# Patient Record
Sex: FEMALE | Race: WHITE | NOT HISPANIC OR LATINO
[De-identification: names, ages, dates, MRNs, and addresses within clinical notes are randomized per-mention and may not be internally consistent; named-entity substitution may affect disease eponyms.]

---

## 2019-05-02 ENCOUNTER — TRANSCRIPTION ENCOUNTER (OUTPATIENT)
Age: 79
End: 2019-05-02

## 2019-05-03 ENCOUNTER — INPATIENT (INPATIENT)
Facility: HOSPITAL | Age: 79
LOS: 4 days | Discharge: NURSING, REHAB OR LONG-TERM CARE | End: 2019-05-08
Attending: INTERNAL MEDICINE | Admitting: INTERNAL MEDICINE
Payer: MEDICARE

## 2019-05-03 VITALS
OXYGEN SATURATION: 92 % | WEIGHT: 145.06 LBS | HEIGHT: 63 IN | RESPIRATION RATE: 20 BRPM | TEMPERATURE: 98 F | HEART RATE: 61 BPM | DIASTOLIC BLOOD PRESSURE: 97 MMHG | SYSTOLIC BLOOD PRESSURE: 180 MMHG

## 2019-05-03 DIAGNOSIS — I10 ESSENTIAL (PRIMARY) HYPERTENSION: ICD-10-CM

## 2019-05-03 DIAGNOSIS — S43.409A UNSPECIFIED SPRAIN OF UNSPECIFIED SHOULDER JOINT, INITIAL ENCOUNTER: ICD-10-CM

## 2019-05-03 DIAGNOSIS — S42.402A UNSPECIFIED FRACTURE OF LOWER END OF LEFT HUMERUS, INITIAL ENCOUNTER FOR CLOSED FRACTURE: ICD-10-CM

## 2019-05-03 PROBLEM — Z00.00 ENCOUNTER FOR PREVENTIVE HEALTH EXAMINATION: Status: ACTIVE | Noted: 2019-05-03

## 2019-05-03 LAB
ALBUMIN SERPL ELPH-MCNC: 3.4 G/DL — SIGNIFICANT CHANGE UP (ref 3.3–5)
ALBUMIN SERPL ELPH-MCNC: 3.5 G/DL — SIGNIFICANT CHANGE UP (ref 3.3–5)
ALP SERPL-CCNC: 73 U/L — SIGNIFICANT CHANGE UP (ref 40–120)
ALP SERPL-CCNC: 74 U/L — SIGNIFICANT CHANGE UP (ref 40–120)
ALT FLD-CCNC: 25 U/L — SIGNIFICANT CHANGE UP (ref 12–78)
ALT FLD-CCNC: 25 U/L — SIGNIFICANT CHANGE UP (ref 12–78)
ANION GAP SERPL CALC-SCNC: 6 MMOL/L — SIGNIFICANT CHANGE UP (ref 5–17)
ANION GAP SERPL CALC-SCNC: 7 MMOL/L — SIGNIFICANT CHANGE UP (ref 5–17)
APTT BLD: 27.2 SEC — LOW (ref 27.5–36.3)
AST SERPL-CCNC: 24 U/L — SIGNIFICANT CHANGE UP (ref 15–37)
AST SERPL-CCNC: 25 U/L — SIGNIFICANT CHANGE UP (ref 15–37)
BASOPHILS # BLD AUTO: 0.03 K/UL — SIGNIFICANT CHANGE UP (ref 0–0.2)
BASOPHILS NFR BLD AUTO: 0.2 % — SIGNIFICANT CHANGE UP (ref 0–2)
BILIRUB SERPL-MCNC: 0.6 MG/DL — SIGNIFICANT CHANGE UP (ref 0.2–1.2)
BILIRUB SERPL-MCNC: 0.7 MG/DL — SIGNIFICANT CHANGE UP (ref 0.2–1.2)
BLD GP AB SCN SERPL QL: SIGNIFICANT CHANGE UP
BUN SERPL-MCNC: 13 MG/DL — SIGNIFICANT CHANGE UP (ref 7–23)
BUN SERPL-MCNC: 15 MG/DL — SIGNIFICANT CHANGE UP (ref 7–23)
CALCIUM SERPL-MCNC: 8.3 MG/DL — LOW (ref 8.5–10.1)
CALCIUM SERPL-MCNC: 8.4 MG/DL — LOW (ref 8.5–10.1)
CHLORIDE SERPL-SCNC: 106 MMOL/L — SIGNIFICANT CHANGE UP (ref 96–108)
CHLORIDE SERPL-SCNC: 107 MMOL/L — SIGNIFICANT CHANGE UP (ref 96–108)
CO2 SERPL-SCNC: 29 MMOL/L — SIGNIFICANT CHANGE UP (ref 22–31)
CO2 SERPL-SCNC: 31 MMOL/L — SIGNIFICANT CHANGE UP (ref 22–31)
CREAT SERPL-MCNC: 0.64 MG/DL — SIGNIFICANT CHANGE UP (ref 0.5–1.3)
CREAT SERPL-MCNC: 0.66 MG/DL — SIGNIFICANT CHANGE UP (ref 0.5–1.3)
EOSINOPHIL # BLD AUTO: 0 K/UL — SIGNIFICANT CHANGE UP (ref 0–0.5)
EOSINOPHIL NFR BLD AUTO: 0 % — SIGNIFICANT CHANGE UP (ref 0–6)
GLUCOSE SERPL-MCNC: 110 MG/DL — HIGH (ref 70–99)
GLUCOSE SERPL-MCNC: 120 MG/DL — HIGH (ref 70–99)
HCT VFR BLD CALC: 48.9 % — HIGH (ref 34.5–45)
HGB BLD-MCNC: 15.8 G/DL — HIGH (ref 11.5–15.5)
IMM GRANULOCYTES NFR BLD AUTO: 0.5 % — SIGNIFICANT CHANGE UP (ref 0–1.5)
INR BLD: 1.05 RATIO — SIGNIFICANT CHANGE UP (ref 0.88–1.16)
LYMPHOCYTES # BLD AUTO: 1.34 K/UL — SIGNIFICANT CHANGE UP (ref 1–3.3)
LYMPHOCYTES # BLD AUTO: 8.3 % — LOW (ref 13–44)
MCHC RBC-ENTMCNC: 30.4 PG — SIGNIFICANT CHANGE UP (ref 27–34)
MCHC RBC-ENTMCNC: 32.3 GM/DL — SIGNIFICANT CHANGE UP (ref 32–36)
MCV RBC AUTO: 94 FL — SIGNIFICANT CHANGE UP (ref 80–100)
MONOCYTES # BLD AUTO: 0.79 K/UL — SIGNIFICANT CHANGE UP (ref 0–0.9)
MONOCYTES NFR BLD AUTO: 4.9 % — SIGNIFICANT CHANGE UP (ref 2–14)
NEUTROPHILS # BLD AUTO: 13.98 K/UL — HIGH (ref 1.8–7.4)
NEUTROPHILS NFR BLD AUTO: 86.1 % — HIGH (ref 43–77)
NRBC # BLD: 0 /100 WBCS — SIGNIFICANT CHANGE UP (ref 0–0)
PLATELET # BLD AUTO: 261 K/UL — SIGNIFICANT CHANGE UP (ref 150–400)
POTASSIUM SERPL-MCNC: 3.6 MMOL/L — SIGNIFICANT CHANGE UP (ref 3.5–5.3)
POTASSIUM SERPL-MCNC: 3.8 MMOL/L — SIGNIFICANT CHANGE UP (ref 3.5–5.3)
POTASSIUM SERPL-SCNC: 3.6 MMOL/L — SIGNIFICANT CHANGE UP (ref 3.5–5.3)
POTASSIUM SERPL-SCNC: 3.8 MMOL/L — SIGNIFICANT CHANGE UP (ref 3.5–5.3)
PROT SERPL-MCNC: 6.7 GM/DL — SIGNIFICANT CHANGE UP (ref 6–8.3)
PROT SERPL-MCNC: 6.7 GM/DL — SIGNIFICANT CHANGE UP (ref 6–8.3)
PROTHROM AB SERPL-ACNC: 11.8 SEC — SIGNIFICANT CHANGE UP (ref 10–12.9)
RBC # BLD: 5.2 M/UL — SIGNIFICANT CHANGE UP (ref 3.8–5.2)
RBC # FLD: 12.5 % — SIGNIFICANT CHANGE UP (ref 10.3–14.5)
SODIUM SERPL-SCNC: 143 MMOL/L — SIGNIFICANT CHANGE UP (ref 135–145)
SODIUM SERPL-SCNC: 143 MMOL/L — SIGNIFICANT CHANGE UP (ref 135–145)
T3 SERPL-MCNC: 127 NG/DL — SIGNIFICANT CHANGE UP (ref 80–200)
T4 AB SER-ACNC: 6.6 UG/DL — SIGNIFICANT CHANGE UP (ref 4.6–12)
TSH SERPL-MCNC: 0.68 UU/ML — SIGNIFICANT CHANGE UP (ref 0.36–3.74)
WBC # BLD: 16.22 K/UL — HIGH (ref 3.8–10.5)
WBC # FLD AUTO: 16.22 K/UL — HIGH (ref 3.8–10.5)

## 2019-05-03 PROCEDURE — 73200 CT UPPER EXTREMITY W/O DYE: CPT | Mod: 26,LT

## 2019-05-03 PROCEDURE — 73090 X-RAY EXAM OF FOREARM: CPT | Mod: 26,52,LT

## 2019-05-03 PROCEDURE — 73130 X-RAY EXAM OF HAND: CPT | Mod: 26,LT

## 2019-05-03 PROCEDURE — 71045 X-RAY EXAM CHEST 1 VIEW: CPT | Mod: 26

## 2019-05-03 PROCEDURE — 73502 X-RAY EXAM HIP UNI 2-3 VIEWS: CPT | Mod: 26,LT

## 2019-05-03 PROCEDURE — 76377 3D RENDER W/INTRP POSTPROCES: CPT | Mod: 26

## 2019-05-03 PROCEDURE — 99285 EMERGENCY DEPT VISIT HI MDM: CPT | Mod: 25

## 2019-05-03 PROCEDURE — 73562 X-RAY EXAM OF KNEE 3: CPT | Mod: 26,LT

## 2019-05-03 PROCEDURE — 73080 X-RAY EXAM OF ELBOW: CPT | Mod: 26,LT

## 2019-05-03 PROCEDURE — 93010 ELECTROCARDIOGRAM REPORT: CPT

## 2019-05-03 PROCEDURE — 73060 X-RAY EXAM OF HUMERUS: CPT | Mod: 26,LT

## 2019-05-03 PROCEDURE — 73110 X-RAY EXAM OF WRIST: CPT | Mod: 26,LT

## 2019-05-03 PROCEDURE — 73070 X-RAY EXAM OF ELBOW: CPT | Mod: 26,59,LT

## 2019-05-03 PROCEDURE — 73020 X-RAY EXAM OF SHOULDER: CPT | Mod: 26,LT

## 2019-05-03 RX ORDER — TETANUS TOXOID, REDUCED DIPHTHERIA TOXOID AND ACELLULAR PERTUSSIS VACCINE, ADSORBED 5; 2.5; 8; 8; 2.5 [IU]/.5ML; [IU]/.5ML; UG/.5ML; UG/.5ML; UG/.5ML
0.5 SUSPENSION INTRAMUSCULAR ONCE
Qty: 0 | Refills: 0 | Status: COMPLETED | OUTPATIENT
Start: 2019-05-03 | End: 2019-05-03

## 2019-05-03 RX ORDER — AMLODIPINE BESYLATE 2.5 MG/1
5 TABLET ORAL DAILY
Qty: 0 | Refills: 0 | Status: DISCONTINUED | OUTPATIENT
Start: 2019-05-03 | End: 2019-05-03

## 2019-05-03 RX ORDER — HEPARIN SODIUM 5000 [USP'U]/ML
5000 INJECTION INTRAVENOUS; SUBCUTANEOUS EVERY 12 HOURS
Qty: 0 | Refills: 0 | Status: DISCONTINUED | OUTPATIENT
Start: 2019-05-03 | End: 2019-05-03

## 2019-05-03 RX ORDER — MORPHINE SULFATE 50 MG/1
2 CAPSULE, EXTENDED RELEASE ORAL EVERY 6 HOURS
Qty: 0 | Refills: 0 | Status: DISCONTINUED | OUTPATIENT
Start: 2019-05-03 | End: 2019-05-03

## 2019-05-03 RX ORDER — CEFAZOLIN SODIUM 1 G
2000 VIAL (EA) INJECTION EVERY 8 HOURS
Qty: 0 | Refills: 0 | Status: COMPLETED | OUTPATIENT
Start: 2019-05-04 | End: 2019-05-04

## 2019-05-03 RX ORDER — INFLUENZA VIRUS VACCINE 15; 15; 15; 15 UG/.5ML; UG/.5ML; UG/.5ML; UG/.5ML
0.5 SUSPENSION INTRAMUSCULAR ONCE
Qty: 0 | Refills: 0 | Status: COMPLETED | OUTPATIENT
Start: 2019-05-03 | End: 2019-05-03

## 2019-05-03 RX ORDER — ACETAMINOPHEN 500 MG
650 TABLET ORAL EVERY 6 HOURS
Qty: 0 | Refills: 0 | Status: DISCONTINUED | OUTPATIENT
Start: 2019-05-03 | End: 2019-05-03

## 2019-05-03 RX ORDER — MORPHINE SULFATE 50 MG/1
4 CAPSULE, EXTENDED RELEASE ORAL ONCE
Qty: 0 | Refills: 0 | Status: DISCONTINUED | OUTPATIENT
Start: 2019-05-03 | End: 2019-05-03

## 2019-05-03 RX ORDER — ACETAMINOPHEN 500 MG
650 TABLET ORAL ONCE
Qty: 0 | Refills: 0 | Status: COMPLETED | OUTPATIENT
Start: 2019-05-03 | End: 2019-05-03

## 2019-05-03 RX ORDER — TRAMADOL HYDROCHLORIDE 50 MG/1
50 TABLET ORAL ONCE
Qty: 0 | Refills: 0 | Status: DISCONTINUED | OUTPATIENT
Start: 2019-05-03 | End: 2019-05-03

## 2019-05-03 RX ORDER — OXYCODONE AND ACETAMINOPHEN 5; 325 MG/1; MG/1
1 TABLET ORAL ONCE
Qty: 0 | Refills: 0 | Status: DISCONTINUED | OUTPATIENT
Start: 2019-05-03 | End: 2019-05-03

## 2019-05-03 RX ADMIN — Medication 650 MILLIGRAM(S): at 08:12

## 2019-05-03 RX ADMIN — TETANUS TOXOID, REDUCED DIPHTHERIA TOXOID AND ACELLULAR PERTUSSIS VACCINE, ADSORBED 0.5 MILLILITER(S): 5; 2.5; 8; 8; 2.5 SUSPENSION INTRAMUSCULAR at 11:04

## 2019-05-03 RX ADMIN — MORPHINE SULFATE 4 MILLIGRAM(S): 50 CAPSULE, EXTENDED RELEASE ORAL at 10:20

## 2019-05-03 RX ADMIN — AMLODIPINE BESYLATE 5 MILLIGRAM(S): 2.5 TABLET ORAL at 17:29

## 2019-05-03 RX ADMIN — TRAMADOL HYDROCHLORIDE 50 MILLIGRAM(S): 50 TABLET ORAL at 08:45

## 2019-05-03 RX ADMIN — Medication 650 MILLIGRAM(S): at 08:45

## 2019-05-03 RX ADMIN — TRAMADOL HYDROCHLORIDE 50 MILLIGRAM(S): 50 TABLET ORAL at 08:12

## 2019-05-03 RX ADMIN — MORPHINE SULFATE 4 MILLIGRAM(S): 50 CAPSULE, EXTENDED RELEASE ORAL at 10:11

## 2019-05-03 NOTE — ED PROVIDER NOTE - OBJECTIVE STATEMENT
79 year old female with PMH of HTN otherwise presenting to ED due to left elbow injury with fall - states was walking out to feed her birds and fell onto left side of elbow and knee - states otherwise with pain to knee on left as well.

## 2019-05-03 NOTE — PROGRESS NOTE ADULT - SUBJECTIVE AND OBJECTIVE BOX
Spoke with Dr Dweey via telephone regarding Ms Mathew's medical status, and disposition for the OR.  Based on his clinical evaluation and review of the preoperative labs and testing, as per Dr Dewey, the patient is medically cleared for operative fixation of her left elbow fracture.

## 2019-05-03 NOTE — H&P ADULT - ASSESSMENT
IMPROVE VTE Individual Risk Assessment    RISK                                                                Points    [  ] Previous VTE                                                  3    [  ] Thrombophilia                                               2    [  ] Lower limb paralysis                                      2        (unable to hold up >15 seconds)      [  ] Current Cancer                                              2         (within 6 months)    [ x ] Immobilization > 24 hrs                                1    [  ] ICU/CCU stay > 24 hours                              1    [ x ] Age > 60                                                      1    IMPROVE VTE Score ___2______    IMPROVE Score 0-1: Low Risk, No VTE prophylaxis required for most patients, encourage ambulation.   IMPROVE Score 2-3: At risk, pharmacologic VTE prophylaxis is indicated for most patients (in the absence of a contraindication)  IMPROVE Score > or = 4: High Risk, pharmacologic VTE prophylaxis is indicated for most patients (in the absence of a contraindication)

## 2019-05-03 NOTE — ED PROVIDER NOTE - MUSCULOSKELETAL MINIMAL EXAM
left elbow with swelling tender to palpation,  unable to range fully. left knee with abrasion 2 separate small ones about 1cm each superficial - ROM intact, hip flexion/extension intact

## 2019-05-03 NOTE — ED ADULT NURSE NOTE - OBJECTIVE STATEMENT
a/o x4 s/p slipped and fall while standing on steps feeding the birds this morning, c/o left arm, shoulder and elbow pains, bleeding and laceration to left knee. Denies hitting head or loosing consciousness

## 2019-05-03 NOTE — H&P ADULT - NSHPLABSRESULTS_GEN_ALL_CORE
LABS:                        15.8   16.22 )-----------( 261      ( 03 May 2019 10:22 )             48.9           PT/INR - ( 03 May 2019 10:22 )   PT: 11.8 sec;   INR: 1.05 ratio         PTT - ( 03 May 2019 10:22 )  PTT:27.2 sec

## 2019-05-03 NOTE — CONSULT NOTE ADULT - ATTENDING COMMENTS
patient with comminuted intraarticular left elbow fracture dislocation. in need of orif- discussed that fragments may be too small for fixation, but will try for orif with screws +/- plate, may need wires, may need secondary surgery of replacement. discussed r/b/a of each including variations in post op protocol/rehab.  she has a pre-op high median nerve palsy and unstable elbow, would be prudent to fix urgently tonight  Risks, benefits and alternatives discussed with the patient at length. Risks include bleeding, infection, malunion, nonunion, hardware failure, injury to nerves, vessels or tendons, pain, stiffness, loss of motion, need for future surgery, loss of function, loss of limb, loss of life.  We discussed the operative plan and post op expectations.  The patient had the opportunity to ask questions. All questions were answered. The patient signed consent of their own accord.

## 2019-05-03 NOTE — ED PROVIDER NOTE - CONSTITUTIONAL, MLM
normal... Well appearing, well nourished, awake, alert, oriented to person, place, time/situation and in moderated distress secondary to pain.

## 2019-05-03 NOTE — ED ADULT NURSE REASSESSMENT NOTE - NS ED NURSE REASSESS COMMENT FT1
report given. pt unaware of name of home BP medication. pt states " I take one pill daily but I do not know the name".

## 2019-05-03 NOTE — CONSULT NOTE ADULT - SUBJECTIVE AND OBJECTIVE BOX
79 RHD Female presents to [facility] ED s/p [facility] fall c/o severe L elbow pain. Was outside feeding the birds, tripped and fell onto L elbow. Having L elbow pain and mild L wrist pain. No LE or hip pain. Did not ambulate after fall.  Patient denies headstrike or LOC. Localizes pain to L elbow. Patient denies radiation of pain. Patient reports faint diffuse L hand/finger paresthesias. No other bone/joint complaints. Patient is a community ambulator at baseline without assistive devices. Patient has no issues w/ ADLs/IADLs. Lives home alone.     PAST MEDICAL & SURGICAL HISTORY:  HTN (hypertension)  No significant past surgical history    MEDICATIONS  (STANDING):  diphtheria/tetanus/pertussis (acellular) Vaccine (ADAcel) 0.5 milliLiter(s) IntraMuscular once  oxyCODONE    5 mG/acetaminophen 325 mG 1 Tablet(s) Oral once    MEDICATIONS  (PRN):    Allergies    No Known Allergies    T(C): 36.4 (05-03-19 @ 06:48), Max: 36.4 (05-03-19 @ 06:48)  HR: 61 (05-03-19 @ 06:48) (61 - 61)  BP: 180/97 (05-03-19 @ 06:48) (180/97 - 180/97)  RR: 20 (05-03-19 @ 06:48) (20 - 20)  SpO2: 92% (05-03-19 @ 06:48) (92% - 92%)  Wt(kg): --    PE   LUE:  Skin intact; Diffuse soft tissue swelling about elbow.  TTP about elbow.   No shoulder TTP.   Mild diffuse nonlocalized wrist pain.  Compartments soft.   Elbow ROM limited 2/2 pain   Motor intact 5/5 ulnar, PIN nerve.   0/5 AIN motor function initial exam.   SILT throughout with subjective paresthesias diffuse about the hand.   Hand well perfused  2+ radial pulse  Brisk capillary refill.     BLLE/RUE:   No bony TTP; Good ROM w/o pain; Exam Unremarkable    Imaging:  XR demonstrating fracture dislocation L elbow with osseous fragment from lateral aspect of joint localized to posterior aspect of humerus - difficult to ascertain donor site, likely capitellum or Lateral epicondyle.   Post reduction reveals reduced joint, Lateral epicondyle fx extending to articular surface of capitellum.   XR L wrist: ulnar shortening, no DRUJ DL appreciated. SL widening without any distal radius fracture. Possible ulnar styloid fracture.   XR L shoulder: single view showing no fracture or dislocation.   XR ap pelvis: no fracture, Questionable L5 VCfx.     Procedure: L elbow closed reduction. 15cc of 1% plain lidocaine used for elbow block via posterior approach. Closed reduction performed, placed into posterior slab with side wings. Tolerated well. Exam unchanged. Continued AIN palsy with diffuse paresthesias. 2+ radial pulse, brisk capillary refill. Hand well perfused.     **unstable at ~40 of flexion. Splinted in 95 degrees of flexion with slight pronation.     79RHD F with L elbow fracture dislocation with AIN palsy s/p closed reduction, Possible DRUJ/ulnar styloid fx, possible L5 VCFx.     - Obtain CT scan L elbow.   - Monitor NV status for any vascular injury with serial NV checks.   - FU official read L hand XR  - Obtain L spine xrays  - Pain control  - NPO/IVF until plan in place  - CBC/BMP/Coags/UA/T+S x2  - EKG/CXR/UA  - Will dw attending after all imaging obtained to determine treatment plan. 79 RHD Female presents to [facility] ED s/p [facility] fall c/o severe L elbow pain. Was outside feeding the birds, tripped and fell onto L elbow. Having L elbow pain and mild L wrist pain. No LE or hip pain. Did not ambulate after fall.  Patient denies headstrike or LOC. Localizes pain to L elbow. Patient denies radiation of pain. Patient reports faint diffuse L hand/finger paresthesias. No other bone/joint complaints. Patient is a community ambulator at baseline without assistive devices. Patient has no issues w/ ADLs/IADLs. Lives home alone.     PAST MEDICAL & SURGICAL HISTORY:  HTN (hypertension)  No significant past surgical history    MEDICATIONS  (STANDING):  diphtheria/tetanus/pertussis (acellular) Vaccine (ADAcel) 0.5 milliLiter(s) IntraMuscular once  oxyCODONE    5 mG/acetaminophen 325 mG 1 Tablet(s) Oral once    MEDICATIONS  (PRN):    Allergies    No Known Allergies    T(C): 36.4 (05-03-19 @ 06:48), Max: 36.4 (05-03-19 @ 06:48)  HR: 61 (05-03-19 @ 06:48) (61 - 61)  BP: 180/97 (05-03-19 @ 06:48) (180/97 - 180/97)  RR: 20 (05-03-19 @ 06:48) (20 - 20)  SpO2: 92% (05-03-19 @ 06:48) (92% - 92%)  Wt(kg): --    PE   LUE:  Skin intact; Diffuse soft tissue swelling about elbow.  TTP about elbow.   No shoulder TTP.   Mild diffuse nonlocalized wrist pain.  Compartments soft.   Elbow ROM limited 2/2 pain   Motor intact 5/5 ulnar, PIN nerve.   0/5 AIN motor function initial exam.   SILT throughout with subjective paresthesias diffuse about the hand.   Hand well perfused  2+ radial pulse  Brisk capillary refill.     BLLE/RUE:   No bony TTP; Good ROM w/o pain; Exam Unremarkable    No axial spine TTP. No low back pain. No hip pain.     Imaging:  XR demonstrating fracture dislocation L elbow with osseous fragment from lateral aspect of joint localized to posterior aspect of humerus - difficult to ascertain donor site, likely capitellum or Lateral epicondyle.   Post reduction reveals reduced joint, Lateral epicondyle fx extending to articular surface of capitellum.   XR L wrist: ulnar shortening, no DRUJ DL appreciated. SL widening without any distal radius fracture.    XR L shoulder: single view showing no fracture or dislocation.   XR ap pelvis: no fracture, Questionable L5 VCfx.   CT L elbow: intraarticular capitellum fx extending proximal to involve large portion of lateral epicondyle. Articular diastasis about fx extension. Reduced ulnohumeral joint. No radial head fracture.     Procedure: L elbow closed reduction. 15cc of 1% plain lidocaine used for elbow block via posterior approach. Closed reduction performed, placed into posterior slab with side wings. Tolerated well. Exam unchanged. Continued AIN palsy with diffuse paresthesias. 2+ radial pulse, brisk capillary refill. Hand well perfused.     **unstable at ~40 of flexion. Splinted in 95 degrees of flexion with slight pronation.     79RHD F with L elbow fracture dislocation with AIN palsy s/p closed reduction, possible L5 VCFx.     - Monitor NV status for any vascular injury with serial NV checks.   - Obtain L spine xrays given partial visualized questionable L5 VCfx on XR ap pelvis.   - Pain control  - NPO/IVF for OR tonight with Dr. Sweet.   - CBC/BMP/Coags/UA/T+S x2  - EKG/CXR/UA  - Hold AC.   - Requires medical clearance for OR this evening - as per chart note, cleared by Dr. Celeste.   - Plan for ORIF tonight.   - DW Dr. Sweet, agrees with above.

## 2019-05-03 NOTE — H&P ADULT - HISTORY OF PRESENT ILLNESS
patient  reports  mechanical fall  at  home evaluated  in  ER  found  to  have  L  elbow   fx   l  shoulder  sparin  l kne e contusion  excoriation admitted  for  further w/u  and  treatment

## 2019-05-03 NOTE — ED ADULT NURSE REASSESSMENT NOTE - NS ED NURSE REASSESS COMMENT FT1
Patient evaluated by orthopedic, left elbow dislocation reduced and splint in place, patient tolerated procedure well.

## 2019-05-03 NOTE — H&P ADULT - NSHPPHYSICALEXAM_GEN_ALL_CORE
PHYSICAL EXAM:    GENERAL: NAD, well-groomed, well-developed  HEAD:  Atraumatic, Normocephalic  EYES: EOMI, PERRLA, conjunctiva and sclera clear  ENMT: No tonsillar erythema, exudates, or enlargement; Moist mucous membranes, , No lesions  NECK: Supple, No JVD, Normal thyroid  NERVOUS SYSTEM:  Alert & Oriented X4, ; Motor Strength 5/5 B/L upper and lower extremities; DTRs 2+ intact and symmetric  CHEST/LUNG: Clear  bilaterally; No rales, rhonchi, wheezing, or rubs  HEART: Regular rate and rhythm; No murmurs, rubs, or gallops  ABDOMEN: Soft, Nontender, Nondistended; no  masses Bowel sounds present  EXTREMITIES:  + Peripheral  pulses  rt  elbow  cast L  knee  excoriation  patellar  area  diffuse  tenderness  LYMPH: No lymphadenopathy noted   RECTAL: deferred  SKIN: No rashes or lesions

## 2019-05-03 NOTE — ED ADULT NURSE REASSESSMENT NOTE - NS ED NURSE REASSESS COMMENT FT1
patient left knee laceration cleanse with saline and bacitracin ointment applied as ordered, covered with Michelle. left arm sling in place and extremity supported on pillows, continues to c/o unrelieved pain and numbness, DR. Lucas informed.

## 2019-05-03 NOTE — ED ADULT TRIAGE NOTE - CHIEF COMPLAINT QUOTE
BIBA, mechanical trip/fall on 1 step,  pt c/o L-elbow pain, laceration to L-knee and redness to forehead.  pt denies hitting head.  pt has L-arm sling on

## 2019-05-03 NOTE — ED PROVIDER NOTE - CLINICAL SUMMARY MEDICAL DECISION MAKING FREE TEXT BOX
fracture of elbow with dislocation-  ortho saw and reduced the elbow pt may need surgical repair however - as well as potential rehab with fall - will admit to Dr. Dewey for further care.

## 2019-05-04 LAB
ALBUMIN SERPL ELPH-MCNC: 3.3 G/DL — SIGNIFICANT CHANGE UP (ref 3.3–5)
ALP SERPL-CCNC: 67 U/L — SIGNIFICANT CHANGE UP (ref 40–120)
ALT FLD-CCNC: 26 U/L — SIGNIFICANT CHANGE UP (ref 12–78)
ANION GAP SERPL CALC-SCNC: 8 MMOL/L — SIGNIFICANT CHANGE UP (ref 5–17)
ANION GAP SERPL CALC-SCNC: 8 MMOL/L — SIGNIFICANT CHANGE UP (ref 5–17)
AST SERPL-CCNC: 28 U/L — SIGNIFICANT CHANGE UP (ref 15–37)
BILIRUB SERPL-MCNC: 0.7 MG/DL — SIGNIFICANT CHANGE UP (ref 0.2–1.2)
BUN SERPL-MCNC: 15 MG/DL — SIGNIFICANT CHANGE UP (ref 7–23)
BUN SERPL-MCNC: 18 MG/DL — SIGNIFICANT CHANGE UP (ref 7–23)
CALCIUM SERPL-MCNC: 8.7 MG/DL — SIGNIFICANT CHANGE UP (ref 8.5–10.1)
CALCIUM SERPL-MCNC: 8.8 MG/DL — SIGNIFICANT CHANGE UP (ref 8.5–10.1)
CHLORIDE SERPL-SCNC: 105 MMOL/L — SIGNIFICANT CHANGE UP (ref 96–108)
CHLORIDE SERPL-SCNC: 105 MMOL/L — SIGNIFICANT CHANGE UP (ref 96–108)
CO2 SERPL-SCNC: 29 MMOL/L — SIGNIFICANT CHANGE UP (ref 22–31)
CO2 SERPL-SCNC: 30 MMOL/L — SIGNIFICANT CHANGE UP (ref 22–31)
CREAT SERPL-MCNC: 0.66 MG/DL — SIGNIFICANT CHANGE UP (ref 0.5–1.3)
CREAT SERPL-MCNC: 0.69 MG/DL — SIGNIFICANT CHANGE UP (ref 0.5–1.3)
GLUCOSE SERPL-MCNC: 119 MG/DL — HIGH (ref 70–99)
GLUCOSE SERPL-MCNC: 157 MG/DL — HIGH (ref 70–99)
HCT VFR BLD CALC: 45.1 % — HIGH (ref 34.5–45)
HGB BLD-MCNC: 14.5 G/DL — SIGNIFICANT CHANGE UP (ref 11.5–15.5)
MAGNESIUM SERPL-MCNC: 2.2 MG/DL — SIGNIFICANT CHANGE UP (ref 1.6–2.6)
MCHC RBC-ENTMCNC: 30.5 PG — SIGNIFICANT CHANGE UP (ref 27–34)
MCHC RBC-ENTMCNC: 32.2 GM/DL — SIGNIFICANT CHANGE UP (ref 32–36)
MCV RBC AUTO: 94.9 FL — SIGNIFICANT CHANGE UP (ref 80–100)
NRBC # BLD: 0 /100 WBCS — SIGNIFICANT CHANGE UP (ref 0–0)
PHOSPHATE SERPL-MCNC: 2.8 MG/DL — SIGNIFICANT CHANGE UP (ref 2.5–4.5)
PLATELET # BLD AUTO: 218 K/UL — SIGNIFICANT CHANGE UP (ref 150–400)
POTASSIUM SERPL-MCNC: 3.4 MMOL/L — LOW (ref 3.5–5.3)
POTASSIUM SERPL-MCNC: 4.1 MMOL/L — SIGNIFICANT CHANGE UP (ref 3.5–5.3)
POTASSIUM SERPL-SCNC: 3.4 MMOL/L — LOW (ref 3.5–5.3)
POTASSIUM SERPL-SCNC: 4.1 MMOL/L — SIGNIFICANT CHANGE UP (ref 3.5–5.3)
PROT SERPL-MCNC: 6.5 GM/DL — SIGNIFICANT CHANGE UP (ref 6–8.3)
RBC # BLD: 4.75 M/UL — SIGNIFICANT CHANGE UP (ref 3.8–5.2)
RBC # FLD: 12.8 % — SIGNIFICANT CHANGE UP (ref 10.3–14.5)
SODIUM SERPL-SCNC: 142 MMOL/L — SIGNIFICANT CHANGE UP (ref 135–145)
SODIUM SERPL-SCNC: 143 MMOL/L — SIGNIFICANT CHANGE UP (ref 135–145)
VIT D25+D1,25 OH+D1,25 PNL SERPL-MCNC: 83.8 PG/ML — HIGH (ref 19.9–79.3)
WBC # BLD: 11.61 K/UL — HIGH (ref 3.8–10.5)
WBC # FLD AUTO: 11.61 K/UL — HIGH (ref 3.8–10.5)

## 2019-05-04 RX ORDER — POTASSIUM CHLORIDE 20 MEQ
20 PACKET (EA) ORAL
Qty: 0 | Refills: 0 | Status: COMPLETED | OUTPATIENT
Start: 2019-05-04 | End: 2019-05-04

## 2019-05-04 RX ORDER — ACETAMINOPHEN 500 MG
650 TABLET ORAL ONCE
Qty: 0 | Refills: 0 | Status: COMPLETED | OUTPATIENT
Start: 2019-05-04 | End: 2019-05-04

## 2019-05-04 RX ORDER — ACETAMINOPHEN 500 MG
1000 TABLET ORAL ONCE
Qty: 0 | Refills: 0 | Status: DISCONTINUED | OUTPATIENT
Start: 2019-05-04 | End: 2019-05-04

## 2019-05-04 RX ORDER — MORPHINE SULFATE 50 MG/1
2 CAPSULE, EXTENDED RELEASE ORAL
Qty: 0 | Refills: 0 | Status: DISCONTINUED | OUTPATIENT
Start: 2019-05-04 | End: 2019-05-04

## 2019-05-04 RX ORDER — HEPARIN SODIUM 5000 [USP'U]/ML
5000 INJECTION INTRAVENOUS; SUBCUTANEOUS EVERY 12 HOURS
Qty: 0 | Refills: 0 | Status: DISCONTINUED | OUTPATIENT
Start: 2019-05-04 | End: 2019-05-08

## 2019-05-04 RX ORDER — SODIUM CHLORIDE 9 MG/ML
1000 INJECTION, SOLUTION INTRAVENOUS
Qty: 0 | Refills: 0 | Status: DISCONTINUED | OUTPATIENT
Start: 2019-05-04 | End: 2019-05-04

## 2019-05-04 RX ADMIN — Medication 20 MILLIEQUIVALENT(S): at 06:44

## 2019-05-04 RX ADMIN — Medication 650 MILLIGRAM(S): at 18:56

## 2019-05-04 RX ADMIN — Medication 20 MILLIEQUIVALENT(S): at 13:26

## 2019-05-04 RX ADMIN — HEPARIN SODIUM 5000 UNIT(S): 5000 INJECTION INTRAVENOUS; SUBCUTANEOUS at 18:24

## 2019-05-04 RX ADMIN — Medication 100 MILLIGRAM(S): at 06:44

## 2019-05-04 RX ADMIN — Medication 650 MILLIGRAM(S): at 19:15

## 2019-05-04 RX ADMIN — Medication 100 MILLIGRAM(S): at 13:26

## 2019-05-04 NOTE — PHYSICAL THERAPY INITIAL EVALUATION ADULT - TRANSFER TRAINING, PT EVAL
GOAL: pt will be able to perform sit to stand transfers independently with use of nicole cane on R (MICHELLE ALVAREZ) within 4 weeks

## 2019-05-04 NOTE — PHYSICAL THERAPY INITIAL EVALUATION ADULT - PERTINENT HX OF CURRENT PROBLEM, REHAB EVAL
Pt is an 78yo F admitted s/p mechanical fall at home. Imaging revealed + comminuted fracture of the lateral humeral epicondyle and capitellum with intervertebral extension, fluid and likely hemorrhagic blood products in the soft tissues about the elbow, & widening of the ulnohumeral articulation. Pt is now s/p L elbow fx/DL ORIF of lateral epicondyle/capitellum on 5/3. Post-op course complicated by associated high median nerve palsy.

## 2019-05-04 NOTE — PHYSICAL THERAPY INITIAL EVALUATION ADULT - BALANCE TRAINING, PT EVAL
GOAL: pt will be able to independently sit at edge of bed while performing RUE manipulation without loss of balance within 4 weeks. pt will be able to independently ambulate 300ft with nicole cane via R (MICHELLE ALVAREZ) without loss of balance within 4 weeks

## 2019-05-04 NOTE — PHYSICAL THERAPY INITIAL EVALUATION ADULT - ORIENTATION, REHAB EVAL
pt was reoriented regarding her surgical procedure as she was unaware/oriented to person, place, time and situation

## 2019-05-04 NOTE — PROGRESS NOTE ADULT - SUBJECTIVE AND OBJECTIVE BOX
Patient is seen and examined at bedside, in CCU. Denies CP/SOB/Dizziness/N/V/D/HA. Pain is controlled.   Numbness in first three digits slightly improved.     Vital Signs Last 24 Hrs  T(C): 37.4 (04 May 2019 00:15), Max: 37.7 (03 May 2019 20:17)  T(F): 99.4 (04 May 2019 00:15), Max: 99.8 (03 May 2019 20:17)  HR: 85 (04 May 2019 01:00) (61 - 96)  BP: 179/88 (04 May 2019 01:00) (133/84 - 190/93)  BP(mean): --  RR: 23 (04 May 2019 01:00) (16 - 28)  SpO2: 96% (04 May 2019 01:00) (92% - 98%)    GEN: NAD  LUE:  Splint CDI.   Motor intact 5/5 ulnar, PIN nerve.   1/5 AIN motor    Dec sensation digits 1-3, slightly improved from preop exam.   SILT otherwise to rest of hand.   Hand well perfused  2+ radial pulse  Brisk capillary refill.     Labs:  pending.      A/P: Patient is a 79y y/o Female s/p L elbow fx/DL ORIF of lateral epicondyle/capitellum POD # 1, with associated high median N palsy     -FU NV exam for high median N palsy, motor still out, sensory slightly improved.   -Pain control/analgesia  -Inc spirometry  -Venodynes/foot pumps  -F/U postop Labs  -NWB LUE In splint.   -PT/OT  -Antibiotics periop   -Anticoagulation, heparin  -Plan for outpatient follow up 7-10days post op with Dr. Sweet.

## 2019-05-04 NOTE — PROGRESS NOTE ADULT - SUBJECTIVE AND OBJECTIVE BOX
INTERVAL HPI/OVERNIGHT EVENTS:    S/P  ORIF  L  elbow    REVIEW OF SYSTEMS:  CONSTITUTIONAL:  no  complaints    NECK: No pain or stiffnes  RESPIRATORY: No SOB   CARDIOVASCULAR: No chest pain, palpitations, dizziness,   GASTROINTESTINAL: No abdominal pain. No nausea, vomiting,   NEUROLOGICAL: No headaches, no  blurry  vision no  dizziness  SKIN: No itching,   MUSCULOSKELETAL: L  elbow  pain  episodically    MEDICATION:  ceFAZolin   IVPB 2000 milliGRAM(s) IV Intermittent every 8 hours  influenza   Vaccine 0.5 milliLiter(s) IntraMuscular once  potassium chloride    Tablet ER 20 milliEquivalent(s) Oral every 2 hours    Vital Signs Last 24 Hrs  T(C): 36.8 (04 May 2019 06:32), Max: 37.7 (03 May 2019 20:17)  T(F): 98.2 (04 May 2019 06:32), Max: 99.8 (03 May 2019 20:17)  HR: 86 (04 May 2019 06:32) (63 - 96)  BP: 137/71 (04 May 2019 06:32) (133/84 - 190/93)  BP(mean): --  RR: 18 (04 May 2019 06:32) (16 - 28)  SpO2: 94% (04 May 2019 06:32) (93% - 98%)    PHYSICAL EXAM:  GENERAL: NAD, well-groomed, well-developed  EYES:  conjunctiva and sclera clear   NECK: Supple, No JVD, Normal thyroid  NERVOUS SYSTEM:  Alert oriented   no  focal  deficits;   CHEST/LUNG: Clear    HEART: Regular rate and rhythm; No murmurs, rubs, or gallops  ABDOMEN: Soft, Nontender, Nondistended; Bowel sounds present  EXTREMITIES:  L UE  cast  fingers  warm PROM  SKIN: No rashes   LABS:                        14.5   11.61 )-----------( 218      ( 04 May 2019 03:18 )             45.1     05-04    142  |  105  |  18  ----------------------------<  157<H>  3.4<L>   |  29  |  0.69    Ca    8.8      04 May 2019 03:18    TPro  6.5  /  Alb  3.3  /  TBili  0.7  /  DBili  x   /  AST  28  /  ALT  26  /  AlkPhos  67  05-04    PT/INR - ( 03 May 2019 10:22 )   PT: 11.8 sec;   INR: 1.05 ratio         PTT - ( 03 May 2019 10:22 )  PTT:27.2 sec    CAPILLARY BLOOD GLUCOSE          RADIOLOGY & ADDITIONAL TESTS:    Imaging reports  Personally Reviewed:  [x ] YES  [ ] NO    Consultant(s) Notes Reviewed:  [x ] YES  [ ] NO    Care Discussed with Consultants/Other Providers [ x] YES  [ ] NO  Problem/Plan - 1:  ·  Problem: Elbow fracture, left.  Plan: antalgics  dvt  prophylaxis  futher  w/u  and  treatment  as  per  clinical  course.     Problem/Plan - 2:  ·  Problem: HTN (hypertension).  Plan: norvasc.     Problem/Plan - 3:  ·  Problem: Shoulder sprain.  Plan: antalgics  PT.   hypokalemia  supplement

## 2019-05-04 NOTE — PROGRESS NOTE ADULT - SUBJECTIVE AND OBJECTIVE BOX
Patient is seen and examined. Denies CP/SOB/Dizziness/N/V/D/HA. Pain is controlled.   Numbness in first three digits slightly improved.     Vital Signs Last 24 Hrs  T(C): 37.4 (04 May 2019 00:15), Max: 37.7 (03 May 2019 20:17)  T(F): 99.4 (04 May 2019 00:15), Max: 99.8 (03 May 2019 20:17)  HR: 85 (04 May 2019 01:00) (61 - 96)  BP: 179/88 (04 May 2019 01:00) (133/84 - 190/93)  BP(mean): --  RR: 23 (04 May 2019 01:00) (16 - 28)  SpO2: 96% (04 May 2019 01:00) (92% - 98%)    GEN: NAD  LUE:  Splint CDI.   Motor intact 5/5 ulnar, PIN nerve.   1/5 AIN motor    Dec sensation digits 1-3, slightly improved from preop exam.   SILT otherwise to rest of hand.   Hand well perfused  2+ radial pulse  Brisk capillary refill.     Labs:  pending.      A/P: Patient is a 79y y/o Female s/p L elbow fx/DL ORIF of lateral epicondyle/capitellum POD # 1, with associated high median N palsy     -FU NV exam for high median N palsy, motor still out, sensory slightly improved.   -Pain control/analgesia  -Inc spirometry  -Venodynes/foot pumps  -F/U postop Labs  -NWB LUE In splint.   -PT/OT  -Antibiotics periop   -Anticoagulation, heparin  -Plan for outpatient follow up 7-10days post op with Dr. Sweet.

## 2019-05-04 NOTE — PHYSICAL THERAPY INITIAL EVALUATION ADULT - GAIT TRAINING, PT EVAL
GOAL: pt will be able to independently ambulate 300ft with nicole cane via R (MICHELLE ALVAREZ) within 4 weeks

## 2019-05-05 ENCOUNTER — TRANSCRIPTION ENCOUNTER (OUTPATIENT)
Age: 79
End: 2019-05-05

## 2019-05-05 LAB
ANION GAP SERPL CALC-SCNC: 6 MMOL/L — SIGNIFICANT CHANGE UP (ref 5–17)
BUN SERPL-MCNC: 13 MG/DL — SIGNIFICANT CHANGE UP (ref 7–23)
CALCIUM SERPL-MCNC: 8.9 MG/DL — SIGNIFICANT CHANGE UP (ref 8.5–10.1)
CHLORIDE SERPL-SCNC: 105 MMOL/L — SIGNIFICANT CHANGE UP (ref 96–108)
CO2 SERPL-SCNC: 31 MMOL/L — SIGNIFICANT CHANGE UP (ref 22–31)
CREAT SERPL-MCNC: 0.54 MG/DL — SIGNIFICANT CHANGE UP (ref 0.5–1.3)
ERYTHROCYTE [SEDIMENTATION RATE] IN BLOOD: 30 MM/HR — HIGH (ref 0–20)
GLUCOSE SERPL-MCNC: 114 MG/DL — HIGH (ref 70–99)
HCT VFR BLD CALC: 45.8 % — HIGH (ref 34.5–45)
HGB BLD-MCNC: 14.8 G/DL — SIGNIFICANT CHANGE UP (ref 11.5–15.5)
MCHC RBC-ENTMCNC: 30.5 PG — SIGNIFICANT CHANGE UP (ref 27–34)
MCHC RBC-ENTMCNC: 32.3 GM/DL — SIGNIFICANT CHANGE UP (ref 32–36)
MCV RBC AUTO: 94.4 FL — SIGNIFICANT CHANGE UP (ref 80–100)
NRBC # BLD: 0 /100 WBCS — SIGNIFICANT CHANGE UP (ref 0–0)
PLATELET # BLD AUTO: 205 K/UL — SIGNIFICANT CHANGE UP (ref 150–400)
POTASSIUM SERPL-MCNC: 3.6 MMOL/L — SIGNIFICANT CHANGE UP (ref 3.5–5.3)
POTASSIUM SERPL-SCNC: 3.6 MMOL/L — SIGNIFICANT CHANGE UP (ref 3.5–5.3)
RBC # BLD: 4.85 M/UL — SIGNIFICANT CHANGE UP (ref 3.8–5.2)
RBC # FLD: 12.9 % — SIGNIFICANT CHANGE UP (ref 10.3–14.5)
SODIUM SERPL-SCNC: 142 MMOL/L — SIGNIFICANT CHANGE UP (ref 135–145)
WBC # BLD: 10.59 K/UL — HIGH (ref 3.8–10.5)
WBC # FLD AUTO: 10.59 K/UL — HIGH (ref 3.8–10.5)

## 2019-05-05 PROCEDURE — 72100 X-RAY EXAM L-S SPINE 2/3 VWS: CPT | Mod: 26

## 2019-05-05 PROCEDURE — 71045 X-RAY EXAM CHEST 1 VIEW: CPT | Mod: 26

## 2019-05-05 RX ORDER — ACETAMINOPHEN 500 MG
650 TABLET ORAL EVERY 6 HOURS
Qty: 0 | Refills: 0 | Status: DISCONTINUED | OUTPATIENT
Start: 2019-05-05 | End: 2019-05-08

## 2019-05-05 RX ORDER — AMLODIPINE BESYLATE 2.5 MG/1
5 TABLET ORAL DAILY
Qty: 0 | Refills: 0 | Status: DISCONTINUED | OUTPATIENT
Start: 2019-05-05 | End: 2019-05-08

## 2019-05-05 RX ORDER — ACETAMINOPHEN 500 MG
650 TABLET ORAL EVERY 6 HOURS
Qty: 0 | Refills: 0 | Status: DISCONTINUED | OUTPATIENT
Start: 2019-05-05 | End: 2019-05-05

## 2019-05-05 RX ADMIN — HEPARIN SODIUM 5000 UNIT(S): 5000 INJECTION INTRAVENOUS; SUBCUTANEOUS at 06:44

## 2019-05-05 RX ADMIN — AMLODIPINE BESYLATE 5 MILLIGRAM(S): 2.5 TABLET ORAL at 18:08

## 2019-05-05 RX ADMIN — HEPARIN SODIUM 5000 UNIT(S): 5000 INJECTION INTRAVENOUS; SUBCUTANEOUS at 18:08

## 2019-05-05 RX ADMIN — Medication 650 MILLIGRAM(S): at 08:33

## 2019-05-05 RX ADMIN — Medication 650 MILLIGRAM(S): at 20:24

## 2019-05-05 RX ADMIN — Medication 650 MILLIGRAM(S): at 07:43

## 2019-05-05 RX ADMIN — Medication 650 MILLIGRAM(S): at 21:50

## 2019-05-05 NOTE — PROGRESS NOTE ADULT - SUBJECTIVE AND OBJECTIVE BOX
Patient is seen and examined. Denies CP/SOB/Dizziness/N/V/D/HA. Pain is controlled.   Numbness in first three digits the same. Walked with PT.     Vital Signs Last 24 Hrs  T(C): 37.4 (04 May 2019 00:15), Max: 37.7 (03 May 2019 20:17)  T(F): 99.4 (04 May 2019 00:15), Max: 99.8 (03 May 2019 20:17)  HR: 85 (04 May 2019 01:00) (61 - 96)  BP: 179/88 (04 May 2019 01:00) (133/84 - 190/93)  BP(mean): --  RR: 23 (04 May 2019 01:00) (16 - 28)  SpO2: 96% (04 May 2019 01:00) (92% - 98%)    GEN: NAD  LUE:  Splint CDI.   Motor intact 5/5 ulnar, PIN nerve.   0/5 AIN motor    Dec sensation digits 1-3, unchanged.  SILT otherwise to rest of hand.   Hand well perfused  2+ radial pulse  Brisk capillary refill.     Labs:  CBC Full  -  ( 05 May 2019 06:51 )  WBC Count : 10.59 K/uL  RBC Count : 4.85 M/uL  Hemoglobin : 14.8 g/dL  Hematocrit : 45.8 %  Platelet Count - Automated : 205 K/uL  Mean Cell Volume : 94.4 fl  Mean Cell Hemoglobin : 30.5 pg  Mean Cell Hemoglobin Concentration : 32.3 gm/dL  Auto Neutrophil # : x  Auto Lymphocyte # : x  Auto Monocyte # : x  Auto Eosinophil # : x  Auto Basophil # : x  Auto Neutrophil % : x  Auto Lymphocyte % : x  Auto Monocyte % : x  Auto Eosinophil % : x  Auto Basophil % : x        A/P: Patient is a 79y y/o Female s/p L elbow fx/DL ORIF of lateral epicondyle/capitellum POD # 2, with associated high median N palsy     -FU NV exam for high median N palsy  -Pain control/analgesia  -Inc spirometry  -Venodynes/foot pumps  -NWB LUE In splint.   -PT/OT  -Anticoagulation, heparin  -Plan for outpatient follow up 7-10days post op with Dr. Sweet.   -Pt stable for DC from hospital. Please reconsult as our service with questions or concerns. Signing off. Patient is seen and examined. Denies CP/SOB/Dizziness/N/V/D/HA. Pain is controlled.   Numbness in first three digits the same. Walked with PT.     Vital Signs Last 24 Hrs  T(C): 37.4 (04 May 2019 00:15), Max: 37.7 (03 May 2019 20:17)  T(F): 99.4 (04 May 2019 00:15), Max: 99.8 (03 May 2019 20:17)  HR: 85 (04 May 2019 01:00) (61 - 96)  BP: 179/88 (04 May 2019 01:00) (133/84 - 190/93)  BP(mean): --  RR: 23 (04 May 2019 01:00) (16 - 28)  SpO2: 96% (04 May 2019 01:00) (92% - 98%)    GEN: NAD  LUE:  Splint CDI.   Motor intact 5/5 ulnar, PIN nerve.   0/5 AIN motor    Dec sensation digits 1-3, unchanged.  SILT otherwise to rest of hand.   Hand well perfused  2+ radial pulse  Brisk capillary refill.     Labs:  CBC Full  -  ( 05 May 2019 06:51 )  WBC Count : 10.59 K/uL  RBC Count : 4.85 M/uL  Hemoglobin : 14.8 g/dL  Hematocrit : 45.8 %  Platelet Count - Automated : 205 K/uL  Mean Cell Volume : 94.4 fl  Mean Cell Hemoglobin : 30.5 pg  Mean Cell Hemoglobin Concentration : 32.3 gm/dL  Auto Neutrophil # : x  Auto Lymphocyte # : x  Auto Monocyte # : x  Auto Eosinophil # : x  Auto Basophil # : x  Auto Neutrophil % : x  Auto Lymphocyte % : x  Auto Monocyte % : x  Auto Eosinophil % : x  Auto Basophil % : x        A/P: Patient is a 79y y/o Female s/p L elbow fx/DL ORIF of lateral epicondyle/capitellum POD # 2, with associated high median N palsy     -FU NV exam for high median N palsy  -Pain control/analgesia  -Inc spirometry  -Venodynes/foot pumps  -NWB LUE In splint.   -PT/OT  -Anticoagulation, heparin  -Plan for outpatient follow up 7-10days post op with Dr. Sweet.   -Pt stable for DC from hospital from ortho standpoint.

## 2019-05-05 NOTE — OCCUPATIONAL THERAPY INITIAL EVALUATION ADULT - PLANNED THERAPY INTERVENTIONS, OT EVAL
cognitive, visual perceptual/transfer training/balance training/strengthening/ADL retraining/bed mobility training

## 2019-05-05 NOTE — OCCUPATIONAL THERAPY INITIAL EVALUATION ADULT - PERTINENT HX OF CURRENT PROBLEM, REHAB EVAL
Patient is a 79y y/o Female s/p L elbow fx/DL ORIF of lateral epicondyle/capitellum POD # 2, with associated high median N palsy

## 2019-05-05 NOTE — DISCHARGE NOTE PROVIDER - NSDCCPCAREPLAN_GEN_ALL_CORE_FT
PRINCIPAL DISCHARGE DIAGNOSIS  Diagnosis: Elbow fracture, left  Assessment and Plan of Treatment:       SECONDARY DISCHARGE DIAGNOSES  Diagnosis: Shoulder sprain  Assessment and Plan of Treatment:     Diagnosis: Knee contusion  Assessment and Plan of Treatment:

## 2019-05-05 NOTE — DISCHARGE NOTE PROVIDER - CARE PROVIDER_API CALL
Jose Sweet)  Orthopaedic Surgery  [address] [place] [address]  Phone: [phone]  Fax: [phone]  Follow Up Time:

## 2019-05-05 NOTE — DISCHARGE NOTE PROVIDER - NSDCFUADDINST_GEN_ALL_CORE_FT
1. Pain Control  2. Non-Weight Bearing Left Upper Extremity in splint with assistive devices (walker/cane) as needed  3. DVT Prophylaxis for 30 days  4. Physical Therapy  5. Follow up with Dr. Sweet as outpatient in 10-14 days after discharge from the hospital or rehab. Call office for appointment.  6. Staples/sutures to be removed Post-Op Day 14, and repeat x-rays as needed.  7. Ice/Elevate affected area as needed.  8. Keep dressing/splint clean and dry

## 2019-05-05 NOTE — DISCHARGE NOTE PROVIDER - HOSPITAL COURSE
patient  underwent  surgical  repair  of  L  elbow  fracture had  fever post  operatively  of  unclear  etiology  rocephin started  empirically  as er  ID  recommendations  ortho  cleared  for  discharge      discharged  to  rehab

## 2019-05-05 NOTE — OCCUPATIONAL THERAPY INITIAL EVALUATION ADULT - TRANSFER SAFETY CONCERNS NOTED: SIT/STAND, REHAB EVAL
losing balance/decreased step length/decreased safety awareness/decreased sequencing ability/inability to maintain weight-bearing restrictions w/o assist/decreased weight-shifting ability/decreased balance during turns

## 2019-05-05 NOTE — OCCUPATIONAL THERAPY INITIAL EVALUATION ADULT - TRANSFER SAFETY CONCERNS NOTED: BED/CHAIR, REHAB EVAL
decreased step length/decreased balance during turns/decreased safety awareness/losing balance/decreased sequencing ability/inability to maintain weight-bearing restrictions w/o assist/decreased weight-shifting ability

## 2019-05-05 NOTE — DISCHARGE NOTE PROVIDER - NSDCCPTREATMENT_GEN_ALL_CORE_FT
PRINCIPAL PROCEDURE  Procedure: Open repair, fracture, periarticular, with elbow dislocation  Findings and Treatment:

## 2019-05-05 NOTE — PROGRESS NOTE ADULT - SUBJECTIVE AND OBJECTIVE BOX
INTERVAL HPI/OVERNIGHT EVENTS:        REVIEW OF SYSTEMS:  CONSTITUTIONAL:  no  complaints    NECK: No pain or stiffnes  RESPIRATORY: No SOB   CARDIOVASCULAR: No chest pain, palpitations, dizziness,   GASTROINTESTINAL: No abdominal pain. No nausea, vomiting,   NEUROLOGICAL: No headaches, no  blurry  vision no  dizziness  SKIN: No itching,   MUSCULOSKELETAL: No pain    MEDICATION:  acetaminophen   Tablet .. 650 milliGRAM(s) Oral every 6 hours PRN  heparin  Injectable 5000 Unit(s) SubCutaneous every 12 hours  influenza   Vaccine 0.5 milliLiter(s) IntraMuscular once    Vital Signs Last 24 Hrs  T(C): 38.2 (05 May 2019 04:32), Max: 38.4 (04 May 2019 17:00)  T(F): 100.8 (05 May 2019 04:32), Max: 101.1 (04 May 2019 17:00)  HR: 110 (05 May 2019 04:32) (85 - 110)  BP: 142/82 (05 May 2019 04:32) (142/82 - 180/80)  BP(mean): --  RR: 18 (05 May 2019 04:32) (18 - 18)  SpO2: 88% (05 May 2019 04:32) (83% - 92%)    PHYSICAL EXAM:  GENERAL: NAD, well-groomed, well-developed  EYES:  conjunctiva and sclera clear  ENMT:  Moist mucous membranes,   NECK: Supple, No JVD, Normal thyroid  NERVOUS SYSTEM:  Alert oriented   no  focal  deficits;   CHEST/LUNG: Clear    HEART: Regular rate and rhythm; No murmurs, rubs, or gallops  ABDOMEN: Soft, Nontender, Nondistended; Bowel sounds present  EXTREMITIES:  no  edema no  tenderness L  UE  cast  decreased  flexion fingers  SKIN: No rashes   LABS:                        14.8   10.59 )-----------( 205      ( 05 May 2019 06:51 )             45.8     05-05    142  |  105  |  13  ----------------------------<  114<H>  3.6   |  31  |  0.54    Ca    8.9      05 May 2019 06:51  Phos  2.8     05-04  Mg     2.2     05-04    TPro  6.5  /  Alb  3.3  /  TBili  0.7  /  DBili  x   /  AST  28  /  ALT  26  /  AlkPhos  67  05-04    PT/INR - ( 03 May 2019 10:22 )   PT: 11.8 sec;   INR: 1.05 ratio         PTT - ( 03 May 2019 10:22 )  PTT:27.2 sec    CAPILLARY BLOOD GLUCOSE          RADIOLOGY & ADDITIONAL TESTS:    Imaging reports  Personally Reviewed:  [ x] YES  [ ] NO    Consultant(s) Notes Reviewed:  [x ] YES  [ ] NO    Care Discussed with Consultants/Other Providers [x ] YES  [ ] NO  Problem/Plan - 1:  ·  Problem: Elbow fracture, left.  Plan: antalgics  dvt  prophylaxis  futher  w/u  and  treatment  as  per  clinical  course.     Problem/Plan - 2:  ·  Problem: HTN (hypertension).  Plan: norvasc.     Problem/Plan - 3:  ·  Problem: Shoulder sprain.  Plan: antalgics  PT.   hypokalemia  supplemented  fever  check  CXR  urine  C/S  tylenol  monitor  labs

## 2019-05-05 NOTE — OCCUPATIONAL THERAPY INITIAL EVALUATION ADULT - ADDITIONAL COMMENTS
As per PT eval and EMR, Pt lives in a private home with 4 entry steps (+ rail). Pt stays on main level, + tenant in basement. Pt states prior to admission she was independent with all functional mobility including community ambulation without a device & ADL's. Pt does not own any DMEs. Pt states she is right hand dominant, wears eye glasses for reading, & is retired. Pt states she has a tub/shower combo, no grab bars, fixed shower head, & standard toilet seat height.

## 2019-05-05 NOTE — OCCUPATIONAL THERAPY INITIAL EVALUATION ADULT - RANGE OF MOTION EXAMINATION, UPPER EXTREMITY
LUE AROM/PROM not tested./Right UE Passive ROM was WFL  (within functional limits)/Right UE Active ROM was WFL (within functional limits)

## 2019-05-05 NOTE — OCCUPATIONAL THERAPY INITIAL EVALUATION ADULT - GENERAL OBSERVATIONS, REHAB EVAL
Pt was encountered OOB in chair; NAD, s/p L elbow fx/DL ORIF of lateral epicondyle/capitellum POD # 2 with associated high median nerve pals, MICHELLE NWB, MICHELLE splinted clean dry and intact, AXOX2, confused at times, cooperative, followed commands.

## 2019-05-06 LAB
ALBUMIN SERPL ELPH-MCNC: 2.7 G/DL — LOW (ref 3.3–5)
ALP SERPL-CCNC: 62 U/L — SIGNIFICANT CHANGE UP (ref 40–120)
ALT FLD-CCNC: 23 U/L — SIGNIFICANT CHANGE UP (ref 12–78)
ANION GAP SERPL CALC-SCNC: 7 MMOL/L — SIGNIFICANT CHANGE UP (ref 5–17)
APPEARANCE UR: CLEAR — SIGNIFICANT CHANGE UP
AST SERPL-CCNC: 20 U/L — SIGNIFICANT CHANGE UP (ref 15–37)
BACTERIA # UR AUTO: ABNORMAL
BILIRUB SERPL-MCNC: 1.3 MG/DL — HIGH (ref 0.2–1.2)
BILIRUB UR-MCNC: NEGATIVE — SIGNIFICANT CHANGE UP
BUN SERPL-MCNC: 13 MG/DL — SIGNIFICANT CHANGE UP (ref 7–23)
CALCIUM SERPL-MCNC: 8.7 MG/DL — SIGNIFICANT CHANGE UP (ref 8.5–10.1)
CHLORIDE SERPL-SCNC: 108 MMOL/L — SIGNIFICANT CHANGE UP (ref 96–108)
CO2 SERPL-SCNC: 30 MMOL/L — SIGNIFICANT CHANGE UP (ref 22–31)
COLOR SPEC: YELLOW — SIGNIFICANT CHANGE UP
CREAT SERPL-MCNC: 0.49 MG/DL — LOW (ref 0.5–1.3)
CRP SERPL-MCNC: 14.72 MG/DL — HIGH (ref 0–0.4)
CULTURE RESULTS: NO GROWTH — SIGNIFICANT CHANGE UP
DIFF PNL FLD: ABNORMAL
EPI CELLS # UR: ABNORMAL
ERYTHROCYTE [SEDIMENTATION RATE] IN BLOOD: 37 MM/HR — HIGH (ref 0–20)
GLUCOSE SERPL-MCNC: 103 MG/DL — HIGH (ref 70–99)
GLUCOSE UR QL: NEGATIVE MG/DL — SIGNIFICANT CHANGE UP
HCT VFR BLD CALC: 44.9 % — SIGNIFICANT CHANGE UP (ref 34.5–45)
HGB BLD-MCNC: 14.4 G/DL — SIGNIFICANT CHANGE UP (ref 11.5–15.5)
KETONES UR-MCNC: ABNORMAL
LEUKOCYTE ESTERASE UR-ACNC: ABNORMAL
MCHC RBC-ENTMCNC: 30.3 PG — SIGNIFICANT CHANGE UP (ref 27–34)
MCHC RBC-ENTMCNC: 32.1 GM/DL — SIGNIFICANT CHANGE UP (ref 32–36)
MCV RBC AUTO: 94.5 FL — SIGNIFICANT CHANGE UP (ref 80–100)
NITRITE UR-MCNC: NEGATIVE — SIGNIFICANT CHANGE UP
NRBC # BLD: 0 /100 WBCS — SIGNIFICANT CHANGE UP (ref 0–0)
PH UR: 6 — SIGNIFICANT CHANGE UP (ref 5–8)
PLATELET # BLD AUTO: 201 K/UL — SIGNIFICANT CHANGE UP (ref 150–400)
POTASSIUM SERPL-MCNC: 3.4 MMOL/L — LOW (ref 3.5–5.3)
POTASSIUM SERPL-SCNC: 3.4 MMOL/L — LOW (ref 3.5–5.3)
PROT SERPL-MCNC: 6.3 GM/DL — SIGNIFICANT CHANGE UP (ref 6–8.3)
PROT UR-MCNC: 15 MG/DL
RBC # BLD: 4.75 M/UL — SIGNIFICANT CHANGE UP (ref 3.8–5.2)
RBC # FLD: 12.9 % — SIGNIFICANT CHANGE UP (ref 10.3–14.5)
SODIUM SERPL-SCNC: 145 MMOL/L — SIGNIFICANT CHANGE UP (ref 135–145)
SP GR SPEC: 1.01 — SIGNIFICANT CHANGE UP (ref 1.01–1.02)
SPECIMEN SOURCE: SIGNIFICANT CHANGE UP
UROBILINOGEN FLD QL: 1 MG/DL
WBC # BLD: 9.72 K/UL — SIGNIFICANT CHANGE UP (ref 3.8–10.5)
WBC # FLD AUTO: 9.72 K/UL — SIGNIFICANT CHANGE UP (ref 3.8–10.5)

## 2019-05-06 RX ORDER — POTASSIUM CHLORIDE 20 MEQ
40 PACKET (EA) ORAL ONCE
Qty: 0 | Refills: 0 | Status: COMPLETED | OUTPATIENT
Start: 2019-05-06 | End: 2019-05-06

## 2019-05-06 RX ORDER — CEFTRIAXONE 500 MG/1
2 INJECTION, POWDER, FOR SOLUTION INTRAMUSCULAR; INTRAVENOUS EVERY 24 HOURS
Qty: 0 | Refills: 0 | Status: DISCONTINUED | OUTPATIENT
Start: 2019-05-06 | End: 2019-05-08

## 2019-05-06 RX ADMIN — HEPARIN SODIUM 5000 UNIT(S): 5000 INJECTION INTRAVENOUS; SUBCUTANEOUS at 05:40

## 2019-05-06 RX ADMIN — Medication 650 MILLIGRAM(S): at 12:07

## 2019-05-06 RX ADMIN — HEPARIN SODIUM 5000 UNIT(S): 5000 INJECTION INTRAVENOUS; SUBCUTANEOUS at 17:29

## 2019-05-06 RX ADMIN — Medication 650 MILLIGRAM(S): at 14:22

## 2019-05-06 RX ADMIN — CEFTRIAXONE 100 GRAM(S): 500 INJECTION, POWDER, FOR SOLUTION INTRAMUSCULAR; INTRAVENOUS at 15:17

## 2019-05-06 RX ADMIN — Medication 40 MILLIEQUIVALENT(S): at 15:17

## 2019-05-06 RX ADMIN — AMLODIPINE BESYLATE 5 MILLIGRAM(S): 2.5 TABLET ORAL at 05:40

## 2019-05-06 NOTE — PROGRESS NOTE ADULT - SUBJECTIVE AND OBJECTIVE BOX
INTERVAL HPI/OVERNIGHT EVENTS:    t  max  101.2       REVIEW OF SYSTEMS:  CONSTITUTIONAL:  no  complaints    NECK: No pain or stiffnes  RESPIRATORY: No SOB   CARDIOVASCULAR: No chest pain, palpitations, dizziness,   GASTROINTESTINAL: No abdominal pain. No nausea, vomiting,   NEUROLOGICAL: No headaches, no  blurry  vision no  dizziness  SKIN: No itching,   MUSCULOSKELETAL: No pain    MEDICATION:  acetaminophen   Tablet .. 650 milliGRAM(s) Oral every 6 hours PRN  amLODIPine   Tablet 5 milliGRAM(s) Oral daily  heparin  Injectable 5000 Unit(s) SubCutaneous every 12 hours    Vital Signs Last 24 Hrs  T(C): 37.3 (06 May 2019 05:10), Max: 38.4 (05 May 2019 20:32)  T(F): 99.1 (06 May 2019 05:10), Max: 101.2 (05 May 2019 20:32)  HR: 87 (06 May 2019 05:10) (87 - 108)  BP: 145/75 (06 May 2019 05:10) (137/71 - 153/66)  BP(mean): --  RR: 18 (06 May 2019 05:10) (17 - 18)  SpO2: 92% (06 May 2019 05:10) (92% - 95%)    PHYSICAL EXAM:  GENERAL: NAD, well-groomed, well-developed  EYES:  conjunctiva and sclera clear  ENMT:  Moist mucous membranes,   NECK: Supple, No JVD, Normal thyroid  NERVOUS SYSTEM:  Alert oriented   no  focal  deficits;   CHEST/LUNG: Clear    HEART: Regular rate and rhythm; No murmurs, rubs, or gallops  ABDOMEN: Soft, Nontender, Nondistended; Bowel sounds present  EXTREMITIES:  no  edema no  tenderness  SKIN: No rashes   LABS:                        14.4   9.72  )-----------( 201      ( 06 May 2019 06:27 )             44.9     05-06    145  |  108  |  13  ----------------------------<  103<H>  3.4<L>   |  30  |  0.49<L>    Ca    8.7      06 May 2019 06:27  Phos  2.8     05-04  Mg     2.2     05-04    TPro  6.3  /  Alb  2.7<L>  /  TBili  1.3<H>  /  DBili  x   /  AST  20  /  ALT  23  /  AlkPhos  62  05-06        CAPILLARY BLOOD GLUCOSE          RADIOLOGY & ADDITIONAL TESTS:    Imaging reports  Personally Reviewed:  [x ] YES  [ ] NO    Consultant(s) Notes Reviewed:  [ x] YES  [ ] NO    Care Discussed with Consultants/Other Providers [x ] YES  [ ] NO  Problem/Plan - 1:  ·  Problem: Elbow fracture, left.  Plan: antalgics  dvt  prophylaxis  s/p  ORIF    Problem/Plan - 2:  ·  Problem: HTN (hypertension).  Plan: norvasc.     Problem/Plan - 3:  ·  Problem: Shoulder sprain.  Plan: antalgics  PT.   hypokalemia  supplemented  fever etiology  unclear, for  ID  eval  before  discharge

## 2019-05-06 NOTE — PROGRESS NOTE ADULT - SUBJECTIVE AND OBJECTIVE BOX
Patient is seen and examined. Denies CP/SOB/Dizziness/N/V/D/HA. Pain is controlled.   Numbness in first three digits the same. Walked with PT.     Vital Signs Last 24 Hrs  T(C): 37.3 (06 May 2019 05:10), Max: 38.4 (05 May 2019 20:32)  T(F): 99.1 (06 May 2019 05:10), Max: 101.2 (05 May 2019 20:32)  HR: 87 (06 May 2019 05:10) (87 - 108)  BP: 145/75 (06 May 2019 05:10) (137/71 - 153/66)  BP(mean): --  RR: 18 (06 May 2019 05:10) (17 - 18)  SpO2: 92% (06 May 2019 05:10) (92% - 95%)    GEN: NAD  LUE:  Splint CDI  AIN motor returning  +PIN/M/U/R/Ax  Dec sensation digits 1-3, unchanged.  SILT otherwise to rest of hand.   Hand well perfused  2+ radial pulse  Brisk capillary refill.         A/P: Patient is a 79y y/o Female s/p L elbow fx/DL ORIF of lateral epicondyle/capitellum POD # 3, with associated high median N palsy   -continue to monitor NV for high median N palsy, AIN returning, dec sensation still in digits 1-3  -Pain control/analgesia  -Inc spirometry  -Venodynes/foot pumps  -NWB LUE In splint.   -PT/OT  -Anticoagulation, heparin  -Plan for outpatient follow up 7-10days post op with Dr. Sweet.   -Pt stable for DC from hospital from ortho standpoint.

## 2019-05-06 NOTE — CONSULT NOTE ADULT - SUBJECTIVE AND OBJECTIVE BOX
HPI:  79yFemale    Allergies :    No Known Allergies    Intolerances        Social History:  Tobacco:  Alcohol:  Recent Travel:  Immunizations:      MEDICATIONS  (STANDING):  amLODIPine   Tablet 5 milliGRAM(s) Oral daily  heparin  Injectable 5000 Unit(s) SubCutaneous every 12 hours    MEDICATIONS  (PRN):  acetaminophen   Tablet .. 650 milliGRAM(s) Oral every 6 hours PRN Temp greater or equal to 38.5C (101.3F), Mild Pain (1 - 3)      LABS:  CBC Full  -  ( 06 May 2019 06:27 )  WBC Count : 9.72 K/uL  RBC Count : 4.75 M/uL  Hemoglobin : 14.4 g/dL  Hematocrit : 44.9 %  Platelet Count - Automated : 201 K/uL  Mean Cell Volume : 94.5 fl  Mean Cell Hemoglobin : 30.3 pg  Mean Cell Hemoglobin Concentration : 32.1 gm/dL  Auto Neutrophil # : x  Auto Lymphocyte # : x  Auto Monocyte # : x  Auto Eosinophil # : x  Auto Basophil # : x  Auto Neutrophil % : x  Auto Lymphocyte % : x  Auto Monocyte % : x  Auto Eosinophil % : x  Auto Basophil % : x    05-06    145  |  108  |  13  ----------------------------<  103<H>  3.4<L>   |  30  |  0.49<L>    Ca    8.7      06 May 2019 06:27    TPro  6.3  /  Alb  2.7<L>  /  TBili  1.3<H>  /  DBili  x   /  AST  20  /  ALT  23  /  AlkPhos  62  05-06    LIVER FUNCTIONS - ( 06 May 2019 06:27 )  Alb: 2.7 g/dL / Pro: 6.3 gm/dL / ALK PHOS: 62 U/L / ALT: 23 U/L / AST: 20 U/L / GGT: x             Sedimentation Rate, Erythrocyte: 37 mm/hr (05-06 @ 06:27)    Sedimentation Rate, Erythrocyte: 30 mm/hr (05-05 @ 11:17)            Radiology:  CXR - < from: Xray Chest 1 View- PORTABLE-Routine (05.05.19 @ 12:06) >  INTERPRETATION:  Clinical history: 79-year-old female, status post ORIF.    Two expiratory/kyphotic views are compared to 5/3/2019 and demonstrate a  normal cardiac silhouette and normal pulmonary vasculature with no   consolidation, effusion, gross adenopathy, pneumothorax or osseous   finding.    Improvement in mild dependent atelectasis at the left base.    Surgical clips in the left axillaryregion again evident.    Impression    No acute radiographic findings, improvement in mild platelike atelectasis   at the left base                                        < from: Xray Lumbosacral Spine 4 View (05.05.19 @ 08:48) >  INTERPRETATION:  Clinical history: 79-year-old female, evaluate L5.    Three views without comparison demonstrate mild to moderate degenerative   changewith mild levoscoliosis. No fracture, spondylolisthesis or   significant loss of vertebral body height.    Impression    Mild to moderate OA and mild levoscoliosis with no acute radiographic   findings. If there is continued clinical concern follow-up MRI can be   ordered                                                            < from: CT 3D Reconstruct w/ Workstation (05.03.19 @ 13:26) >    EXAM:  CT 3D RECONSTRUCT W CATRACHOGLENDY                          PROCEDURE DATE:  05/03/2019      INTERPRETATION:    CT of the left elbow without contrast.    CLINICAL INFORMATION: Fracture, dislocation.  COMPARISONS:  Multiple left elbow radiographs performed on 5/3/2019.    TECHNIQUE: Axial CT images were obtained of the left elbow with coronal   and sagittal reconstructions. No contrast was administered. Three   dimensional reconstructions were obtained on an independent workstation.    FINDINGS:    There is a comminuted fracture of the lateral humeral epicondyle and   capitellum. Largest fracture fragment measures 0.8 x 2.2 cm. There is   intra-articular extension of the fracture. Widening of the ulnohumeral   articulation. Soft tissue stranding and fluid in the subcutaneous tissues   about the elbow. Small foci of gas in the soft tissues about the elbow   and in the elbow joint. There is elbow joint effusion, likely containing   hemorrhagic products. Dense fluid and stranding in the subcutaneous   tissues about the elbow, likely containing hemorrhagic products.    IMPRESSION:    Comminuted fracture of the lateral humeral epicondyle and capitellum with   intervertebral extension. Fluid and likely hemorrhagic blood products in   the soft tissues about the elbow. Widening of the ulnohumeral   articulation.                         < from: Xray Chest 1 View-PORTABLE IMMEDIATE (05.03.19 @ 11:12) >  COMPARISON: None available.    Poor inspiration crowds the chest. The heart is magnified by technique.    The aorta is ectatic.    Clips in the left breast/axillary area are noted.    There is a slight increase in interstitial pattern in the lower lung   fields right greater than left. This could represent chronic disease.    IMPRESSION: Increasing interstitial pattern in the lower lungs right   greater than left suggesting chronic etiology. Clips in the left   breast/axillary region.                              < from: Xray Hand 3 Views, Left (05.03.19 @ 11:11) >  INTERPRETATION:  HISTORY: Left hand.    TECHNIQUE: AP, oblique and lateral views of the left hand.  Radiographs   of the left wrist performed on the same    FINDINGS AND   IMPRESSION:  The bones are osteopenic and evaluation is limited due to an   overlying cast.  Alignment is anatomic. There are marked degenerative   changes at the first carpometacarpal joint.                                      < from: Xray Forearm, Left (05.03.19 @ 09:11) >  FINDINGS/  IMPRESSION:    Multiple lateral views of the left elbow were obtained with a single view   of the left radius and ulna.    There is a posterior ulnotrochlear joint dislocation. The capitellum is   fractured off. The radiocapitellar articulation is maintained.    Cannot evaluate for joint effusion.          Vital Signs Last 24 Hrs  T(C): 37.3 (06 May 2019 05:10), Max: 38.4 (05 May 2019 20:32)  T(F): 99.1 (06 May 2019 05:10), Max: 101.2 (05 May 2019 20:32)  HR: 87 (06 May 2019 05:10) (87 - 108)  BP: 145/75 (06 May 2019 05:10) (137/71 - 153/66)  BP(mean): --  RR: 18 (06 May 2019 05:10) (17 - 18)  SpO2: 92% (06 May 2019 05:10) (92% - 95%)  Supplemental O2:    PHYSICAL EXAM    General:  HEENT:   Neck:  Heart:  Lungs:  Abdomen:  Extremities:  Skin:          I&O's Summary :    05 May 2019 07:01  -  06 May 2019 07:00  --------------------------------------------------------  IN: 360 mL / OUT: 0 mL / NET: 360 mL    06 May 2019 07:01  -  06 May 2019 12:34  --------------------------------------------------------  IN: 0 mL / OUT: 300 mL / NET: -300 mL        Impression:    Suggestions: HPI:  80 y/o white female with hx of HTN, Breast Ca, s/p Lt Mastectomy, s/p Cholecystectomy, admitted via the ER on 5/3 after tripping on the front steps at home when she went outside to feed the birds/ animals and sustained a Fx with Dislocation of the Lt Elbow and an abrasion to the Lt Knee.  No LOC, no head trauma, no c/o palpitations or SOB.  Patient had an elevated WBC's on admission and was seen bt Ortho and taken to the OR that night for an Open Repair of the Fx/Dislocation of the Lt. Elbow.  She rec'd Ancef perioperatively and post-op now has developed Fevers to 101.1 - 101.2 over the past 2 days.  C/o feeling hot and noted some "tremors" of the Lt arm/hand, but no c/o HA, no SOB, no cp, no abdominal pain, no N/V/D and no c/o dysuria or difficulty voiding.  She does have some pain/ discomfort of the Lt arm.          Allergies :  No Known Allergies  Intolerances - none        Social History:  Tobacco: negative  Alcohol: negative  Recent Travel: none  Immunizations: unsure  Patient lives at home in her own 2-family house with a tenant - she is a   No pets   Ambulates usually without any devices      MEDICATIONS  (STANDING):  amLODIPine   Tablet 5 milliGRAM(s) Oral daily  heparin  Injectable 5000 Unit(s) SubCutaneous every 12 hours    MEDICATIONS  (PRN):  acetaminophen   Tablet .. 650 milliGRAM(s) Oral every 6 hours PRN Temp greater or equal to 38.5C (101.3F), Mild Pain (1 - 3)      LABS:  CBC Full  -  ( 06 May 2019 06:27 )  WBC Count : 9.72 K/uL  RBC Count : 4.75 M/uL  Hemoglobin : 14.4 g/dL  Hematocrit : 44.9 %  Platelet Count - Automated : 201 K/uL  Mean Cell Volume : 94.5 fl  Mean Cell Hemoglobin : 30.3 pg  Mean Cell Hemoglobin Concentration : 32.1 gm/dL  Auto Neutrophil # : x  Auto Lymphocyte # : x  Auto Monocyte # : x  Auto Eosinophil # : x  Auto Basophil # : x  Auto Neutrophil % : x  Auto Lymphocyte % : x  Auto Monocyte % : x  Auto Eosinophil % : x  Auto Basophil % : x    05-06    145  |  108  |  13  ----------------------------<  103<H>  3.4<L>   |  30  |  0.49<L>    Ca    8.7      06 May 2019 06:27    TPro  6.3  /  Alb  2.7<L>  /  TBili  1.3<H>  /  DBili  x   /  AST  20  /  ALT  23  /  AlkPhos  62  05-06    LIVER FUNCTIONS - ( 06 May 2019 06:27 )  Alb: 2.7 g/dL / Pro: 6.3 gm/dL / ALK PHOS: 62 U/L / ALT: 23 U/L / AST: 20 U/L / GGT: x             Sedimentation Rate, Erythrocyte: 37 mm/hr (05-06 @ 06:27)    Sedimentation Rate, Erythrocyte: 30 mm/hr (05-05 @ 11:17)            Radiology:  CXR - < from: Xray Chest 1 View- PORTABLE-Routine (05.05.19 @ 12:06) >  INTERPRETATION:  Clinical history: 79-year-old female, status post ORIF.    Two expiratory/kyphotic views are compared to 5/3/2019 and demonstrate a  normal cardiac silhouette and normal pulmonary vasculature with no   consolidation, effusion, gross adenopathy, pneumothorax or osseous   finding.    Improvement in mild dependent atelectasis at the left base.    Surgical clips in the left axillaryregion again evident.    Impression    No acute radiographic findings, improvement in mild platelike atelectasis   at the left base                                        < from: Xray Lumbosacral Spine 4 View (05.05.19 @ 08:48) >  INTERPRETATION:  Clinical history: 79-year-old female, evaluate L5.    Three views without comparison demonstrate mild to moderate degenerative   changewith mild levoscoliosis. No fracture, spondylolisthesis or   significant loss of vertebral body height.    Impression    Mild to moderate OA and mild levoscoliosis with no acute radiographic   findings. If there is continued clinical concern follow-up MRI can be   ordered                                                            < from: CT 3D Reconstruct w/ Workstation (05.03.19 @ 13:26) >    [facility]  [facility]                          PROCEDURE DATE:  05/03/2019      INTERPRETATION:    CT of the left elbow without contrast.    CLINICAL INFORMATION: Fracture, dislocation.  COMPARISONS:  Multiple left elbow radiographs performed on 5/3/2019.    TECHNIQUE: Axial CT images were obtained of the left elbow with coronal   and sagittal reconstructions. No contrast was administered. Three   dimensional reconstructions were obtained on an independent workstation.    FINDINGS:    There is a comminuted fracture of the lateral humeral epicondyle and   capitellum. Largest fracture fragment measures 0.8 x 2.2 cm. There is   intra-articular extension of the fracture. Widening of the ulnohumeral   articulation. Soft tissue stranding and fluid in the subcutaneous tissues   about the elbow. Small foci of gas in the soft tissues about the elbow   and in the elbow joint. There is elbow joint effusion, likely containing   hemorrhagic products. Dense fluid and stranding in the subcutaneous   tissues about the elbow, likely containing hemorrhagic products.    IMPRESSION:    Comminuted fracture of the lateral humeral epicondyle and capitellum with   intervertebral extension. Fluid and likely hemorrhagic blood products in   the soft tissues about the elbow. Widening of the ulnohumeral   articulation.                         < from: Xray Chest 1 View-PORTABLE IMMEDIATE (05.03.19 @ 11:12) >  COMPARISON: None available.    Poor inspiration crowds the chest. The heart is magnified by technique.    The aorta is ectatic.    Clips in the left breast/axillary area are noted.    There is a slight increase in interstitial pattern in the lower lung   fields right greater than left. This could represent chronic disease.    IMPRESSION: Increasing interstitial pattern in the lower lungs right   greater than left suggesting chronic etiology. Clips in the left   breast/axillary region.                              < from: Xray Hand 3 Views, Left (05.03.19 @ 11:11) >  INTERPRETATION:  HISTORY: Left hand.    TECHNIQUE: AP, oblique and lateral views of the left hand.  Radiographs   of the left wrist performed on the same    FINDINGS AND   IMPRESSION:  The bones are osteopenic and evaluation is limited due to an   overlying cast.  Alignment is anatomic. There are marked degenerative   changes at the first carpometacarpal joint.                                      < from: Xray Forearm, Left (05.03.19 @ 09:11) >  FINDINGS/  IMPRESSION:    Multiple lateral views of the left elbow were obtained with a single view   of the left radius and ulna.    There is a posterior ulnotrochlear joint dislocation. The capitellum is   fractured off. The radiocapitellar articulation is maintained.    Cannot evaluate for joint effusion.          Vital Signs Last 24 Hrs  T(C): 37.3 (06 May 2019 05:10), Max: 38.4 (05 May 2019 20:32)  T(F): 99.1 (06 May 2019 05:10), Max: 101.2 (05 May 2019 20:32)  HR: 87 (06 May 2019 05:10) (87 - 108)  BP: 145/75 (06 May 2019 05:10) (137/71 - 153/66)  BP(mean): --  RR: 18 (06 May 2019 05:10) (17 - 18)  SpO2: 92% (06 May 2019 05:10) (92% - 95%)  Supplemental O2: on RA     PHYSICAL EXAM    General: 80 y/o white female awake, alert, sitting up in bed now, pleasant and cooperative, in NAD now  HEENT: conj pink, sclerae anicteric, PERRLA, no oral lesions noted  Neck: supple, no nodes noted  Heart: RR  S/p Lt Mastectomy - site well-healed  Lungs: clear bilaterally  Abdomen: soft, BS+, nontender to palpation, well-healed Rt. side vertical incision, no masses or HS-megaly detected  No CVA or Spinal tenderness noted to palpation  Extremities: no edema LE's                   LUE with OR dressing and Ace Wrap/ Cast in place - patient is able to move her fingers but c/o some numbness of a few fingers - mildly swollen now  Skin: warm, dry, no rash noted        I&O's Summary :    05 May 2019 07:01  -  06 May 2019 07:00  --------------------------------------------------------  IN: 360 mL / OUT: 0 mL / NET: 360 mL    06 May 2019 07:01  -  06 May 2019 12:34  --------------------------------------------------------  IN: 0 mL / OUT: 300 mL / NET: -300 mL        Impression:  80 y/o white female with hx of HTN, Breast Ca, s/p Lt Mastectomy, s/p Cholecystectomy, now admitted with Fx/ Dislocation of the Lt Elbow s/p fall outside her home.  S/p Open Surgical repair and now with fevers but localizing complaints.  ? unclear source of fever at this time.    Suggestions:  Will therefore request Blood and Urine Cx's now and begin empiric rx with Rocephin pending further results.  Repeat labs in AM.  Discussed with patient in detail at bedside.  Thanks, will follow with you.

## 2019-05-07 LAB
ALBUMIN SERPL ELPH-MCNC: 2.4 G/DL — LOW (ref 3.3–5)
ALP SERPL-CCNC: 59 U/L — SIGNIFICANT CHANGE UP (ref 40–120)
ALT FLD-CCNC: 26 U/L — SIGNIFICANT CHANGE UP (ref 12–78)
ANION GAP SERPL CALC-SCNC: 8 MMOL/L — SIGNIFICANT CHANGE UP (ref 5–17)
AST SERPL-CCNC: 20 U/L — SIGNIFICANT CHANGE UP (ref 15–37)
BILIRUB SERPL-MCNC: 0.8 MG/DL — SIGNIFICANT CHANGE UP (ref 0.2–1.2)
BUN SERPL-MCNC: 16 MG/DL — SIGNIFICANT CHANGE UP (ref 7–23)
CALCIUM SERPL-MCNC: 8.9 MG/DL — SIGNIFICANT CHANGE UP (ref 8.5–10.1)
CHLORIDE SERPL-SCNC: 106 MMOL/L — SIGNIFICANT CHANGE UP (ref 96–108)
CO2 SERPL-SCNC: 28 MMOL/L — SIGNIFICANT CHANGE UP (ref 22–31)
CREAT SERPL-MCNC: 0.5 MG/DL — SIGNIFICANT CHANGE UP (ref 0.5–1.3)
CRP SERPL-MCNC: 10.96 MG/DL — HIGH (ref 0–0.4)
ERYTHROCYTE [SEDIMENTATION RATE] IN BLOOD: 53 MM/HR — HIGH (ref 0–20)
GLUCOSE SERPL-MCNC: 105 MG/DL — HIGH (ref 70–99)
HCT VFR BLD CALC: 43.3 % — SIGNIFICANT CHANGE UP (ref 34.5–45)
HGB BLD-MCNC: 14 G/DL — SIGNIFICANT CHANGE UP (ref 11.5–15.5)
MCHC RBC-ENTMCNC: 30.5 PG — SIGNIFICANT CHANGE UP (ref 27–34)
MCHC RBC-ENTMCNC: 32.3 GM/DL — SIGNIFICANT CHANGE UP (ref 32–36)
MCV RBC AUTO: 94.3 FL — SIGNIFICANT CHANGE UP (ref 80–100)
NRBC # BLD: 0 /100 WBCS — SIGNIFICANT CHANGE UP (ref 0–0)
PLATELET # BLD AUTO: 222 K/UL — SIGNIFICANT CHANGE UP (ref 150–400)
POTASSIUM SERPL-MCNC: 3.7 MMOL/L — SIGNIFICANT CHANGE UP (ref 3.5–5.3)
POTASSIUM SERPL-SCNC: 3.7 MMOL/L — SIGNIFICANT CHANGE UP (ref 3.5–5.3)
PROT SERPL-MCNC: 6.3 GM/DL — SIGNIFICANT CHANGE UP (ref 6–8.3)
RBC # BLD: 4.59 M/UL — SIGNIFICANT CHANGE UP (ref 3.8–5.2)
RBC # FLD: 12.9 % — SIGNIFICANT CHANGE UP (ref 10.3–14.5)
SODIUM SERPL-SCNC: 142 MMOL/L — SIGNIFICANT CHANGE UP (ref 135–145)
WBC # BLD: 9.15 K/UL — SIGNIFICANT CHANGE UP (ref 3.8–10.5)
WBC # FLD AUTO: 9.15 K/UL — SIGNIFICANT CHANGE UP (ref 3.8–10.5)

## 2019-05-07 RX ADMIN — CEFTRIAXONE 100 GRAM(S): 500 INJECTION, POWDER, FOR SOLUTION INTRAMUSCULAR; INTRAVENOUS at 14:54

## 2019-05-07 RX ADMIN — AMLODIPINE BESYLATE 5 MILLIGRAM(S): 2.5 TABLET ORAL at 06:09

## 2019-05-07 RX ADMIN — HEPARIN SODIUM 5000 UNIT(S): 5000 INJECTION INTRAVENOUS; SUBCUTANEOUS at 06:09

## 2019-05-07 RX ADMIN — HEPARIN SODIUM 5000 UNIT(S): 5000 INJECTION INTRAVENOUS; SUBCUTANEOUS at 17:54

## 2019-05-07 NOTE — PROGRESS NOTE ADULT - SUBJECTIVE AND OBJECTIVE BOX
Pt S/E at bedside, no acute events overnight, pain controlled denies SOB or CP. Had a fever of unknown orgin which responded to tylenol    Vital Signs Last 24 Hrs  T(C): 37.5 (07 May 2019 05:01), Max: 38 (06 May 2019 17:27)  T(F): 99.5 (07 May 2019 05:01), Max: 100.4 (06 May 2019 17:27)  HR: 95 (07 May 2019 05:01) (95 - 118)  BP: 149/74 (07 May 2019 05:01) (148/88 - 162/73)  BP(mean): --  RR: 18 (07 May 2019 05:01) (18 - 18)  SpO2: 95% (07 May 2019 05:01) (93% - 100%)    Gen: NAD,     Left Upper Extremity:  Dressing/Splint clean dry intact  +PIN/R/U/Msc/Ax, decreased median nerve and AIN function  SILT C5-T1 except over tip of thumb, entire index and middle fingers with numbness up to the MCP  Brisk CR  Compartments which are accessible are soft  No calf TTP B/L

## 2019-05-07 NOTE — PROGRESS NOTE ADULT - SUBJECTIVE AND OBJECTIVE BOX
INTERVAL HPI/OVERNIGHT EVENTS:        REVIEW OF SYSTEMS:  CONSTITUTIONAL:  continues  with  fever    NECK: No pain or stiffnes  RESPIRATORY: No SOB   CARDIOVASCULAR: No chest pain, palpitations, dizziness,   GASTROINTESTINAL: No abdominal pain. No nausea, vomiting,   NEUROLOGICAL: No headaches, no  blurry  vision no  dizziness  SKIN: No itching,   MUSCULOSKELETAL: No pain    MEDICATION:  acetaminophen   Tablet .. 650 milliGRAM(s) Oral every 6 hours PRN  amLODIPine   Tablet 5 milliGRAM(s) Oral daily  cefTRIAXone   IVPB 2 Gram(s) IV Intermittent every 24 hours  heparin  Injectable 5000 Unit(s) SubCutaneous every 12 hours    Vital Signs Last 24 Hrs  T(C): 37.5 (07 May 2019 05:01), Max: 38 (06 May 2019 17:27)  T(F): 99.5 (07 May 2019 05:01), Max: 100.4 (06 May 2019 17:27)  HR: 95 (07 May 2019 05:01) (95 - 118)  BP: 149/74 (07 May 2019 05:01) (148/88 - 162/73)  BP(mean): --  RR: 18 (07 May 2019 05:01) (18 - 18)  SpO2: 95% (07 May 2019 05:01) (93% - 100%)    PHYSICAL EXAM:  GENERAL: NAD, well-groomed, well-developed  EYES:  conjunctiva and sclera clear  ENMT:  Moist mucous membranes,   NECK: Supple, No JVD, Normal thyroid  NERVOUS SYSTEM:  Alert oriented   no  focal  deficits;   CHEST/LUNG: Clear    HEART: Regular rate and rhythm; No murmurs, rubs, or gallops  ABDOMEN: Soft, Nontender, Nondistended; Bowel sounds present  EXTREMITIES: mild  tenderness  fingers  tenderness  SKIN: No rashes   LABS:                        14.0   9.15  )-----------( 222      ( 07 May 2019 06:57 )             43.3         142  |  106  |  16  ----------------------------<  105<H>  3.7   |  28  |  0.50    Ca    8.9      07 May 2019 06:57    TPro  6.3  /  Alb  2.4<L>  /  TBili  0.8  /  DBili  x   /  AST  20  /  ALT  26  /  AlkPhos  59        Urinalysis Basic - ( 06 May 2019 20:15 )    Color: Yellow / Appearance: Clear / S.015 / pH: x  Gluc: x / Ketone: Trace  / Bili: Negative / Urobili: 1 mg/dL   Blood: x / Protein: 15 mg/dL / Nitrite: Negative   Leuk Esterase: Trace / RBC: x / WBC x   Sq Epi: x / Non Sq Epi: Moderate / Bacteria: Occasional      CAPILLARY BLOOD GLUCOSE          RADIOLOGY & ADDITIONAL TESTS:    Imaging reports  Personally Reviewed:  [x ] YES  [ ] NO    Consultant(s) Notes Reviewed:  [x ] YES  [ ] NO    Care Discussed with Consultants/Other Providers [ x] YES  [ ] NO  Problem/Plan - 1:  ·  Problem: Elbow fracture, left.  Plan: antalgics  dvt  prophylaxis  s/p  ORIF    Problem/Plan - 2:  ·  Problem: HTN (hypertension).  Plan: norvasc.     Problem/Plan - 3:  ·  Problem: Shoulder sprain.  Plan: antalgics  PT.   hypokalemia  supplemented  fever etiology  unclear  continue  AB  a  per ID

## 2019-05-07 NOTE — PROGRESS NOTE ADULT - ASSESSMENT
A/P: Patient is a 79y y/o Female s/p L elbow fx/DL ORIF of lateral epicondyle/capitellum POD # 4, with associated high median N palsy   -continue to monitor NV for high median N palsy, AIN returning, dec sensation still in digits 1-3  -Pain control/analgesia  -Inc spirometry  -Venodynes/foot pumps  -NWB LUE In splint.   -PT/OT  -Anticoagulation, heparin  -Plan for outpatient follow up 7-10days post op with Dr. Sweet.   -Pt stable for DC from hospital from ortho standpoint.

## 2019-05-08 ENCOUNTER — TRANSCRIPTION ENCOUNTER (OUTPATIENT)
Age: 79
End: 2019-05-08

## 2019-05-08 VITALS
HEART RATE: 92 BPM | SYSTOLIC BLOOD PRESSURE: 124 MMHG | RESPIRATION RATE: 18 BRPM | TEMPERATURE: 99 F | DIASTOLIC BLOOD PRESSURE: 76 MMHG | OXYGEN SATURATION: 95 %

## 2019-05-08 LAB
ALBUMIN SERPL ELPH-MCNC: 2.6 G/DL — LOW (ref 3.3–5)
ALP SERPL-CCNC: 59 U/L — SIGNIFICANT CHANGE UP (ref 40–120)
ALT FLD-CCNC: 36 U/L — SIGNIFICANT CHANGE UP (ref 12–78)
ANION GAP SERPL CALC-SCNC: 8 MMOL/L — SIGNIFICANT CHANGE UP (ref 5–17)
AST SERPL-CCNC: 31 U/L — SIGNIFICANT CHANGE UP (ref 15–37)
BILIRUB SERPL-MCNC: 0.8 MG/DL — SIGNIFICANT CHANGE UP (ref 0.2–1.2)
BUN SERPL-MCNC: 17 MG/DL — SIGNIFICANT CHANGE UP (ref 7–23)
CALCIUM SERPL-MCNC: 9.1 MG/DL — SIGNIFICANT CHANGE UP (ref 8.5–10.1)
CHLORIDE SERPL-SCNC: 106 MMOL/L — SIGNIFICANT CHANGE UP (ref 96–108)
CO2 SERPL-SCNC: 28 MMOL/L — SIGNIFICANT CHANGE UP (ref 22–31)
CREAT SERPL-MCNC: 0.54 MG/DL — SIGNIFICANT CHANGE UP (ref 0.5–1.3)
GLUCOSE SERPL-MCNC: 104 MG/DL — HIGH (ref 70–99)
HCT VFR BLD CALC: 43.4 % — SIGNIFICANT CHANGE UP (ref 34.5–45)
HGB BLD-MCNC: 13.9 G/DL — SIGNIFICANT CHANGE UP (ref 11.5–15.5)
MCHC RBC-ENTMCNC: 30.3 PG — SIGNIFICANT CHANGE UP (ref 27–34)
MCHC RBC-ENTMCNC: 32 GM/DL — SIGNIFICANT CHANGE UP (ref 32–36)
MCV RBC AUTO: 94.8 FL — SIGNIFICANT CHANGE UP (ref 80–100)
NRBC # BLD: 0 /100 WBCS — SIGNIFICANT CHANGE UP (ref 0–0)
PLATELET # BLD AUTO: 270 K/UL — SIGNIFICANT CHANGE UP (ref 150–400)
POTASSIUM SERPL-MCNC: 4.1 MMOL/L — SIGNIFICANT CHANGE UP (ref 3.5–5.3)
POTASSIUM SERPL-SCNC: 4.1 MMOL/L — SIGNIFICANT CHANGE UP (ref 3.5–5.3)
PROT SERPL-MCNC: 6.6 GM/DL — SIGNIFICANT CHANGE UP (ref 6–8.3)
RBC # BLD: 4.58 M/UL — SIGNIFICANT CHANGE UP (ref 3.8–5.2)
RBC # FLD: 13.1 % — SIGNIFICANT CHANGE UP (ref 10.3–14.5)
SODIUM SERPL-SCNC: 142 MMOL/L — SIGNIFICANT CHANGE UP (ref 135–145)
WBC # BLD: 8.16 K/UL — SIGNIFICANT CHANGE UP (ref 3.8–10.5)
WBC # FLD AUTO: 8.16 K/UL — SIGNIFICANT CHANGE UP (ref 3.8–10.5)

## 2019-05-08 RX ORDER — ACETAMINOPHEN 500 MG
2 TABLET ORAL
Qty: 0 | Refills: 0 | DISCHARGE
Start: 2019-05-08

## 2019-05-08 RX ORDER — HEPARIN SODIUM 5000 [USP'U]/ML
5000 INJECTION INTRAVENOUS; SUBCUTANEOUS
Qty: 0 | Refills: 0 | DISCHARGE
Start: 2019-05-08

## 2019-05-08 RX ORDER — CEFTRIAXONE 500 MG/1
2 INJECTION, POWDER, FOR SOLUTION INTRAMUSCULAR; INTRAVENOUS
Qty: 0 | Refills: 0 | DISCHARGE
Start: 2019-05-08

## 2019-05-08 RX ORDER — AMLODIPINE BESYLATE 2.5 MG/1
1 TABLET ORAL
Qty: 0 | Refills: 0 | DISCHARGE
Start: 2019-05-08

## 2019-05-08 RX ADMIN — HEPARIN SODIUM 5000 UNIT(S): 5000 INJECTION INTRAVENOUS; SUBCUTANEOUS at 16:48

## 2019-05-08 RX ADMIN — AMLODIPINE BESYLATE 5 MILLIGRAM(S): 2.5 TABLET ORAL at 05:08

## 2019-05-08 RX ADMIN — CEFTRIAXONE 100 GRAM(S): 500 INJECTION, POWDER, FOR SOLUTION INTRAMUSCULAR; INTRAVENOUS at 15:08

## 2019-05-08 RX ADMIN — HEPARIN SODIUM 5000 UNIT(S): 5000 INJECTION INTRAVENOUS; SUBCUTANEOUS at 05:07

## 2019-05-08 NOTE — PROGRESS NOTE ADULT - SUBJECTIVE AND OBJECTIVE BOX
TMAX - 100    On day # 3 Ramses    Vital Signs Last 24 Hrs  T(C): 36.7 (08 May 2019 11:01), Max: 37.5 (07 May 2019 17:42)  T(F): 98 (08 May 2019 11:01), Max: 99.5 (07 May 2019 17:42)  HR: 88 (08 May 2019 11:01) (87 - 99)  BP: 136/59 (08 May 2019 11:01) (127/65 - 152/72)  BP(mean): --  RR: 18 (08 May 2019 11:01) (18 - 18)  SpO2: 95% (08 May 2019 11:01) (92% - 96%)  Supplemental O2:  on RA       Awake, alert, sitting up in the chair.  Claims to be feeling better although still with some pain/ discomfort of the LUE.  No c/o cp or SOB, but still with intermittent cough with some white mucous production.  Appetite is good and no N/V/D.  Ambulating with PT and a walker.          PHYSICAL EXAM  General:  awake, alert, sitting up in the chair now, pleasant and cooperative, in NAD  HEENT:  conj pink, sclerae anicteric, PERRLA, no oral lesions noted  Neck:  supple, no nodes noted  Heart: RR   Lungs:  clear bilaterally  Abdomen:  soft, BS+, nontender to palpation  No CVA or Spinal tenderness elicited  Extremities:  no edema LE's                    LUE with dressing/ ace wrap in place and sling in use  - fingers slightly swollen but able to move them - some numbness reported of the 1st, 2nd, and 3rd fingers  Skin:  warm, dry, no rash noted        I&O's Summary :    07 May 2019 07:01  -  08 May 2019 07:00  --------------------------------------------------------  IN: 480 mL / OUT: 0 mL / NET: 480 mL      LABS:  CBC Full  -  ( 08 May 2019 06:35 )  WBC Count : 8.16 K/uL  RBC Count : 4.58 M/uL  Hemoglobin : 13.9 g/dL  Hematocrit : 43.4 %  Platelet Count - Automated : 270 K/uL  Mean Cell Volume : 94.8 fl  Mean Cell Hemoglobin : 30.3 pg  Mean Cell Hemoglobin Concentration : 32.0 gm/dL  Auto Neutrophil # : x  Auto Lymphocyte # : x  Auto Monocyte # : x  Auto Eosinophil # : x  Auto Basophil # : x  Auto Neutrophil % : x  Auto Lymphocyte % : x  Auto Monocyte % : x  Auto Eosinophil % : x  Auto Basophil % : x        142  |  106  |  17  ----------------------------<  104<H>  4.1   |  28  |  0.54    Ca    9.1      08 May 2019 06:35    TPro  6.6  /  Alb  2.6<L>  /  TBili  0.8  /  DBili  x   /  AST  31  /  ALT  36  /  AlkPhos  59      LIVER FUNCTIONS - ( 08 May 2019 06:35 )  Alb: 2.6 g/dL / Pro: 6.6 gm/dL / ALK PHOS: 59 U/L / ALT: 36 U/L / AST: 31 U/L / GGT: x               Urinalysis Basic - ( 06 May 2019 20:15 )  Color: Yellow / Appearance: Clear / S.015 / pH: x  Gluc: x / Ketone: Trace  / Bili: Negative / Urobili: 1 mg/dL   Blood: x / Protein: 15 mg/dL / Nitrite: Negative   Leuk Esterase: Trace / RBC: x / WBC x   Sq Epi: x / Non Sq Epi: Moderate / Bacteria: Occasional      Sedimentation Rate, Erythrocyte: 53 mm/hr ( @ 09:53)    Sedimentation Rate, Erythrocyte: 37 mm/hr ( @ 06:27)    Sedimentation Rate, Erythrocyte: 30 mm/hr ( @ 11:17)      CRP - 10.96              MICROBIOLOGY:  Specimen Source: .Blood ( @ 19:36)  Culture Results:   No growth to date. ( @ 19:36)    Specimen Source: .Blood ( @ 19:35)  Culture Results:   No growth to date. ( @ 19:35)    Specimen Source: .Urine ( @ 00:26)  Culture Results:   No growth ( @ 00:26)          Radiology:  < from: Xray Chest 1 View- PORTABLE-Routine (19 @ 12:06) >  INTERPRETATION:  Clinical history: 79-year-old female, status post ORIF.    Two expiratory/kyphotic views are compared to 5/3/2019 and demonstrate a  normal cardiac silhouette and normal pulmonary vasculature with no   consolidation, effusion, gross adenopathy, pneumothorax or osseous   finding.    Improvement in mild dependent atelectasis at the left base.    Surgical clips in the left axillaryregion again evident.    Impression    No acute radiographic findings, improvement in mild platelike atelectasis   at the left base        Impression:   Temps trending downwards now on present ab rx with Rocephin for empiric ab rx of Sepsis s/p Lt Elbow Fx Repair in the OR.  Blood and Urine Cx's remain negative thus far.     Suggestions:  Will continue current ab rx for now and follow-up temps and labs.  Discussed with patient at bedside in detail.

## 2019-05-08 NOTE — PROGRESS NOTE ADULT - SUBJECTIVE AND OBJECTIVE BOX
INTERVAL HPI/OVERNIGHT EVENTS:        REVIEW OF SYSTEMS:  CONSTITUTIONAL:  no  complaints    NECK: No pain or stiffnes  RESPIRATORY: No SOB   CARDIOVASCULAR: No chest pain, palpitations, dizziness,   GASTROINTESTINAL: No abdominal pain. No nausea, vomiting,   NEUROLOGICAL: No headaches, no  blurry  vision no  dizziness  SKIN: No itching,   MUSCULOSKELETAL: No pain    MEDICATION:  acetaminophen   Tablet .. 650 milliGRAM(s) Oral every 6 hours PRN  amLODIPine   Tablet 5 milliGRAM(s) Oral daily  cefTRIAXone   IVPB 2 Gram(s) IV Intermittent every 24 hours  heparin  Injectable 5000 Unit(s) SubCutaneous every 12 hours    Vital Signs Last 24 Hrs  T(C): 37.1 (08 May 2019 05:24), Max: 37.5 (07 May 2019 17:42)  T(F): 98.8 (08 May 2019 05:24), Max: 99.5 (07 May 2019 17:42)  HR: 99 (08 May 2019 05:24) (87 - 108)  BP: 136/85 (08 May 2019 05:24) (127/65 - 152/72)  BP(mean): --  RR: 18 (08 May 2019 05:24) (18 - 18)  SpO2: 93% (08 May 2019 05:24) (92% - 96%)    PHYSICAL EXAM:  GENERAL: NAD, well-groomed, well-developed  EYES:  conjunctiva and sclera clear  ENMT:  Moist mucous membranes,   NECK: Supple, No JVD, Normal thyroid  NERVOUS SYSTEM:  Alert oriented   no  focal  deficits;   CHEST/LUNG: Clear    HEART: Regular rate and rhythm; No murmurs, rubs, or gallops  ABDOMEN: Soft, Nontender, Nondistended; Bowel sounds present  EXTREMITIES:  no  edema no  tenderness  SKIN: No rashes   LABS:                        13.9   8.16  )-----------( 270      ( 08 May 2019 06:35 )             43.4     0508    142  |  106  |  17  ----------------------------<  104<H>  4.1   |  28  |  0.54    Ca    9.1      08 May 2019 06:35    TPro  6.6  /  Alb  2.6<L>  /  TBili  0.8  /  DBili  x   /  AST  31  /  ALT  36  /  AlkPhos  59  08      Urinalysis Basic - ( 06 May 2019 20:15 )    Color: Yellow / Appearance: Clear / S.015 / pH: x  Gluc: x / Ketone: Trace  / Bili: Negative / Urobili: 1 mg/dL   Blood: x / Protein: 15 mg/dL / Nitrite: Negative   Leuk Esterase: Trace / RBC: x / WBC x   Sq Epi: x / Non Sq Epi: Moderate / Bacteria: Occasional      CAPILLARY BLOOD GLUCOSE          RADIOLOGY & ADDITIONAL TESTS:    Imaging reports  Personally Reviewed:  [ x] YES  [ ] NO    Consultant(s) Notes Reviewed:  [x ] YES  [ ] NO    Care Discussed with Consultants/Other Providers [x ] YES  [ ] NO  Problem/Plan - 1:  ·  Problem: Elbow fracture, left.  Plan: antalgics  dvt  prophylaxis  s/p  ORIF    Problem/Plan - 2:  ·  Problem: HTN (hypertension).  Plan: norvasc.     Problem/Plan - 3:  ·  Problem: Shoulder sprain.  Plan: antalgics  PT.   hypokalemia  supplemented  fever all cultures  negative etiology  unclear Ortho  F/U  noted    continue  AB empirically  discharge  to  rehab

## 2019-05-08 NOTE — PROGRESS NOTE ADULT - SUBJECTIVE AND OBJECTIVE BOX
Pt S/E at bedside, no acute events overnight, pain controlled denies SOB or CP. No acute complaints.     Vital Signs Last 24 Hrs  T(C): 37.4 (08 May 2019 00:38), Max: 37.5 (07 May 2019 05:01)  T(F): 99.3 (08 May 2019 00:38), Max: 99.5 (07 May 2019 05:01)  HR: 97 (08 May 2019 00:38) (87 - 108)  BP: 152/72 (08 May 2019 00:38) (127/65 - 152/72)  BP(mean): --  RR: 18 (08 May 2019 00:38) (18 - 18)  SpO2: 92% (08 May 2019 00:38) (92% - 96%)  Gen: NAD,     Left Upper Extremity:  Dressing/Splint clean dry intact  +PIN/R/U/Msc/Ax, decreased median nerve and AIN function  SILT C5-T1 except over tip of thumb, entire index and middle fingers with decreased sensation up to the MCP  Brisk CR  Compartments which are accessible are soft  No calf TTP B/L

## 2019-05-08 NOTE — PROGRESS NOTE ADULT - ASSESSMENT
A/P: Patient is a 79y y/o Female s/p L elbow fx/DL ORIF of lateral epicondyle/capitellum POD # 5, with associated high median N palsy   -continue to monitor NV for high median N palsy, AIN returning, dec sensation still in digits 1-3, improving  -Pain control/analgesia  -Inc spirometry  -Venodynes/foot pumps  -NWB LUE In splint.   -PT/OT  -Anticoagulation, heparin  -Plan for outpatient follow up 7-10days post op with Dr. Sweet.   -Pt stable for DC from hospital from ortho standpoint.

## 2019-05-08 NOTE — DISCHARGE NOTE NURSING/CASE MANAGEMENT/SOCIAL WORK - NSDCDPATPORTLINK_GEN_ALL_CORE
You can access the "[employer] Patient Portal, offered by [facility], by registering with the following website: [email or web address]

## 2019-05-08 NOTE — PROGRESS NOTE ADULT - REASON FOR ADMISSION
L elbow  fracture L knee  fracture

## 2019-05-08 NOTE — PROGRESS NOTE ADULT - PROVIDER SPECIALTY LIST ADULT
Infectious Disease
Internal Medicine
Orthopedics
Internal Medicine

## 2019-05-11 LAB
CULTURE RESULTS: SIGNIFICANT CHANGE UP
CULTURE RESULTS: SIGNIFICANT CHANGE UP
SPECIMEN SOURCE: SIGNIFICANT CHANGE UP
SPECIMEN SOURCE: SIGNIFICANT CHANGE UP

## 2019-05-15 DIAGNOSIS — E87.6 HYPOKALEMIA: ICD-10-CM

## 2019-05-15 DIAGNOSIS — Z85.3 PERSONAL HISTORY OF MALIGNANT NEOPLASM OF BREAST: ICD-10-CM

## 2019-05-15 DIAGNOSIS — S80.02XA CONTUSION OF LEFT KNEE, INITIAL ENCOUNTER: ICD-10-CM

## 2019-05-15 DIAGNOSIS — Z90.12 ACQUIRED ABSENCE OF LEFT BREAST AND NIPPLE: ICD-10-CM

## 2019-05-15 DIAGNOSIS — G56.12 OTHER LESIONS OF MEDIAN NERVE, LEFT UPPER LIMB: ICD-10-CM
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
## 2019-05-15 DIAGNOSIS — R25.1 TREMOR, UNSPECIFIED: ICD-10-CM

## 2019-05-15 DIAGNOSIS — M25.532 PAIN IN LEFT WRIST: ICD-10-CM

## 2019-05-15 DIAGNOSIS — Z90.49 ACQUIRED ABSENCE OF OTHER SPECIFIED PARTS OF DIGESTIVE TRACT: ICD-10-CM

## 2019-05-15 DIAGNOSIS — I10 ESSENTIAL (PRIMARY) HYPERTENSION: ICD-10-CM

## 2019-05-15 DIAGNOSIS — S42.402A UNSPECIFIED FRACTURE OF LOWER END OF LEFT HUMERUS, INITIAL ENCOUNTER FOR CLOSED FRACTURE: ICD-10-CM

## 2019-05-15 DIAGNOSIS — D72.829 ELEVATED WHITE BLOOD CELL COUNT, UNSPECIFIED: ICD-10-CM
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
## 2019-05-15 DIAGNOSIS — S43.402A UNSPECIFIED SPRAIN OF LEFT SHOULDER JOINT, INITIAL ENCOUNTER: ICD-10-CM

## 2019-05-15 DIAGNOSIS — R50.82 POSTPROCEDURAL FEVER: ICD-10-CM

## 2019-05-18 ENCOUNTER — OUTPATIENT (OUTPATIENT)
Dept: OUTPATIENT SERVICES | Facility: HOSPITAL | Age: 79
LOS: 1 days | Discharge: HOME | End: 2019-05-18
Payer: MEDICARE

## 2019-05-18 DIAGNOSIS — S42.402A UNSPECIFIED FRACTURE OF LOWER END OF LEFT HUMERUS, INITIAL ENCOUNTER FOR CLOSED FRACTURE: ICD-10-CM

## 2019-05-18 PROBLEM — I10 ESSENTIAL (PRIMARY) HYPERTENSION: Chronic | Status: ACTIVE | Noted: 2019-05-03

## 2019-05-18 PROCEDURE — 73060 X-RAY EXAM OF HUMERUS: CPT | Mod: 26,LT

## 2019-05-18 PROCEDURE — 73090 X-RAY EXAM OF FOREARM: CPT | Mod: 26,LT

## 2019-05-18 PROCEDURE — 73070 X-RAY EXAM OF ELBOW: CPT | Mod: 26,LT

## 2021-09-29 ENCOUNTER — INPATIENT (INPATIENT)
Facility: HOSPITAL | Age: 81
LOS: 8 days | Discharge: NURSING, REHAB OR LONG-TERM CARE | End: 2021-10-08
Attending: INTERNAL MEDICINE | Admitting: INTERNAL MEDICINE
Payer: MEDICARE

## 2021-09-29 VITALS
HEART RATE: 113 BPM | RESPIRATION RATE: 20 BRPM | TEMPERATURE: 99 F | WEIGHT: 179.9 LBS | DIASTOLIC BLOOD PRESSURE: 83 MMHG | SYSTOLIC BLOOD PRESSURE: 176 MMHG | HEIGHT: 64 IN

## 2021-09-29 DIAGNOSIS — J18.9 PNEUMONIA, UNSPECIFIED ORGANISM: ICD-10-CM

## 2021-09-29 DIAGNOSIS — E87.6 HYPOKALEMIA: ICD-10-CM

## 2021-09-29 DIAGNOSIS — M25.562 PAIN IN LEFT KNEE: ICD-10-CM

## 2021-09-29 DIAGNOSIS — Z90.12 ACQUIRED ABSENCE OF LEFT BREAST AND NIPPLE: ICD-10-CM

## 2021-09-29 DIAGNOSIS — Z23 ENCOUNTER FOR IMMUNIZATION: ICD-10-CM

## 2021-09-29 DIAGNOSIS — I10 ESSENTIAL (PRIMARY) HYPERTENSION: ICD-10-CM

## 2021-09-29 DIAGNOSIS — M25.561 PAIN IN RIGHT KNEE: ICD-10-CM

## 2021-09-29 DIAGNOSIS — E04.2 NONTOXIC MULTINODULAR GOITER: ICD-10-CM

## 2021-09-29 DIAGNOSIS — Z85.3 PERSONAL HISTORY OF MALIGNANT NEOPLASM OF BREAST: ICD-10-CM

## 2021-09-29 DIAGNOSIS — G89.29 OTHER CHRONIC PAIN: ICD-10-CM

## 2021-09-29 DIAGNOSIS — E66.9 OBESITY, UNSPECIFIED: ICD-10-CM

## 2021-09-29 DIAGNOSIS — I87.2 VENOUS INSUFFICIENCY (CHRONIC) (PERIPHERAL): ICD-10-CM

## 2021-09-29 DIAGNOSIS — R26.9 UNSPECIFIED ABNORMALITIES OF GAIT AND MOBILITY: ICD-10-CM

## 2021-09-29 DIAGNOSIS — F17.210 NICOTINE DEPENDENCE, CIGARETTES, UNCOMPLICATED: ICD-10-CM

## 2021-09-29 DIAGNOSIS — R41.3 OTHER AMNESIA: ICD-10-CM

## 2021-09-29 LAB
ALBUMIN SERPL ELPH-MCNC: 2.9 G/DL — LOW (ref 3.3–5)
ALP SERPL-CCNC: 71 U/L — SIGNIFICANT CHANGE UP (ref 40–120)
ALT FLD-CCNC: 40 U/L — SIGNIFICANT CHANGE UP (ref 12–78)
ANION GAP SERPL CALC-SCNC: 6 MMOL/L — SIGNIFICANT CHANGE UP (ref 5–17)
APPEARANCE UR: CLEAR — SIGNIFICANT CHANGE UP
AST SERPL-CCNC: 36 U/L — SIGNIFICANT CHANGE UP (ref 15–37)
BACTERIA # UR AUTO: ABNORMAL
BASOPHILS # BLD AUTO: 0.05 K/UL — SIGNIFICANT CHANGE UP (ref 0–0.2)
BASOPHILS NFR BLD AUTO: 0.4 % — SIGNIFICANT CHANGE UP (ref 0–2)
BILIRUB SERPL-MCNC: 1.1 MG/DL — SIGNIFICANT CHANGE UP (ref 0.2–1.2)
BILIRUB UR-MCNC: NEGATIVE — SIGNIFICANT CHANGE UP
BUN SERPL-MCNC: 11 MG/DL — SIGNIFICANT CHANGE UP (ref 7–23)
CALCIUM SERPL-MCNC: 8.7 MG/DL — SIGNIFICANT CHANGE UP (ref 8.5–10.1)
CHLORIDE SERPL-SCNC: 103 MMOL/L — SIGNIFICANT CHANGE UP (ref 96–108)
CO2 SERPL-SCNC: 30 MMOL/L — SIGNIFICANT CHANGE UP (ref 22–31)
COLOR SPEC: YELLOW — SIGNIFICANT CHANGE UP
COMMENT - URINE: SIGNIFICANT CHANGE UP
CREAT SERPL-MCNC: 0.62 MG/DL — SIGNIFICANT CHANGE UP (ref 0.5–1.3)
DIFF PNL FLD: ABNORMAL
EOSINOPHIL # BLD AUTO: 0 K/UL — SIGNIFICANT CHANGE UP (ref 0–0.5)
EOSINOPHIL NFR BLD AUTO: 0 % — SIGNIFICANT CHANGE UP (ref 0–6)
EPI CELLS # UR: ABNORMAL
FLUAV AG NPH QL: SIGNIFICANT CHANGE UP
FLUBV AG NPH QL: SIGNIFICANT CHANGE UP
GLUCOSE SERPL-MCNC: 110 MG/DL — HIGH (ref 70–99)
GLUCOSE UR QL: NEGATIVE MG/DL — SIGNIFICANT CHANGE UP
HCT VFR BLD CALC: 48.9 % — HIGH (ref 34.5–45)
HGB BLD-MCNC: 15.9 G/DL — HIGH (ref 11.5–15.5)
IMM GRANULOCYTES NFR BLD AUTO: 0.5 % — SIGNIFICANT CHANGE UP (ref 0–1.5)
KETONES UR-MCNC: ABNORMAL
LACTATE SERPL-SCNC: 1.4 MMOL/L — SIGNIFICANT CHANGE UP (ref 0.7–2)
LEUKOCYTE ESTERASE UR-ACNC: NEGATIVE — SIGNIFICANT CHANGE UP
LYMPHOCYTES # BLD AUTO: 1.42 K/UL — SIGNIFICANT CHANGE UP (ref 1–3.3)
LYMPHOCYTES # BLD AUTO: 10.2 % — LOW (ref 13–44)
MCHC RBC-ENTMCNC: 29.8 PG — SIGNIFICANT CHANGE UP (ref 27–34)
MCHC RBC-ENTMCNC: 32.5 GM/DL — SIGNIFICANT CHANGE UP (ref 32–36)
MCV RBC AUTO: 91.6 FL — SIGNIFICANT CHANGE UP (ref 80–100)
MONOCYTES # BLD AUTO: 1.12 K/UL — HIGH (ref 0–0.9)
MONOCYTES NFR BLD AUTO: 8.1 % — SIGNIFICANT CHANGE UP (ref 2–14)
NEUTROPHILS # BLD AUTO: 11.22 K/UL — HIGH (ref 1.8–7.4)
NEUTROPHILS NFR BLD AUTO: 80.8 % — HIGH (ref 43–77)
NITRITE UR-MCNC: NEGATIVE — SIGNIFICANT CHANGE UP
NRBC # BLD: 0 /100 WBCS — SIGNIFICANT CHANGE UP (ref 0–0)
PH UR: 6.5 — SIGNIFICANT CHANGE UP (ref 5–8)
PLATELET # BLD AUTO: 220 K/UL — SIGNIFICANT CHANGE UP (ref 150–400)
POTASSIUM SERPL-MCNC: 4.9 MMOL/L — SIGNIFICANT CHANGE UP (ref 3.5–5.3)
POTASSIUM SERPL-SCNC: 4.9 MMOL/L — SIGNIFICANT CHANGE UP (ref 3.5–5.3)
PROT SERPL-MCNC: 7.6 GM/DL — SIGNIFICANT CHANGE UP (ref 6–8.3)
PROT UR-MCNC: 30 MG/DL
RBC # BLD: 5.34 M/UL — HIGH (ref 3.8–5.2)
RBC # FLD: 13.2 % — SIGNIFICANT CHANGE UP (ref 10.3–14.5)
RBC CASTS # UR COMP ASSIST: ABNORMAL /HPF (ref 0–4)
SARS-COV-2 RNA SPEC QL NAA+PROBE: SIGNIFICANT CHANGE UP
SODIUM SERPL-SCNC: 139 MMOL/L — SIGNIFICANT CHANGE UP (ref 135–145)
SP GR SPEC: 1.02 — SIGNIFICANT CHANGE UP (ref 1.01–1.02)
UROBILINOGEN FLD QL: 4 MG/DL
WBC # BLD: 13.88 K/UL — HIGH (ref 3.8–10.5)
WBC # FLD AUTO: 13.88 K/UL — HIGH (ref 3.8–10.5)
WBC UR QL: ABNORMAL

## 2021-09-29 PROCEDURE — 99285 EMERGENCY DEPT VISIT HI MDM: CPT

## 2021-09-29 PROCEDURE — 71250 CT THORAX DX C-: CPT | Mod: 26,MA

## 2021-09-29 PROCEDURE — 73562 X-RAY EXAM OF KNEE 3: CPT | Mod: 26,50

## 2021-09-29 PROCEDURE — 71045 X-RAY EXAM CHEST 1 VIEW: CPT | Mod: 26

## 2021-09-29 PROCEDURE — 93010 ELECTROCARDIOGRAM REPORT: CPT

## 2021-09-29 PROCEDURE — 99221 1ST HOSP IP/OBS SF/LOW 40: CPT | Mod: GC

## 2021-09-29 RX ORDER — ACETAMINOPHEN 500 MG
975 TABLET ORAL ONCE
Refills: 0 | Status: COMPLETED | OUTPATIENT
Start: 2021-09-29 | End: 2021-09-29

## 2021-09-29 RX ORDER — CEFTRIAXONE 500 MG/1
1000 INJECTION, POWDER, FOR SOLUTION INTRAMUSCULAR; INTRAVENOUS ONCE
Refills: 0 | Status: COMPLETED | OUTPATIENT
Start: 2021-09-29 | End: 2021-09-29

## 2021-09-29 RX ORDER — SODIUM CHLORIDE 9 MG/ML
1000 INJECTION INTRAMUSCULAR; INTRAVENOUS; SUBCUTANEOUS ONCE
Refills: 0 | Status: COMPLETED | OUTPATIENT
Start: 2021-09-29 | End: 2021-09-29

## 2021-09-29 RX ADMIN — CEFTRIAXONE 100 MILLIGRAM(S): 500 INJECTION, POWDER, FOR SOLUTION INTRAMUSCULAR; INTRAVENOUS at 22:32

## 2021-09-29 RX ADMIN — Medication 975 MILLIGRAM(S): at 22:32

## 2021-09-29 RX ADMIN — Medication 975 MILLIGRAM(S): at 23:09

## 2021-09-29 RX ADMIN — SODIUM CHLORIDE 1000 MILLILITER(S): 9 INJECTION INTRAMUSCULAR; INTRAVENOUS; SUBCUTANEOUS at 22:32

## 2021-09-29 NOTE — ED ADULT NURSE NOTE - NSIMPLEMENTINTERV_GEN_ALL_ED
Implemented All Fall Risk Interventions:  [place] to call system. Call bell, personal items and telephone within reach. Instruct patient to call for assistance. Room bathroom lighting operational. Non-slip footwear when patient is off stretcher. Physically safe environment: no spills, clutter or unnecessary equipment. Stretcher in lowest position, wheels locked, appropriate side rails in place. Provide visual cue, wrist band, yellow gown, etc. Monitor gait and stability. Monitor for mental status changes and reorient to person, place, and time. Review medications for side effects contributing to fall risk. Reinforce activity limits and safety measures with patient and family.

## 2021-09-29 NOTE — H&P ADULT - PROBLEM SELECTOR PLAN 1
-Patient p/w knee pain and inability to ambulate   -On P/E no warmth or bogginess of knee joints, no s/s suggestive of acute gout. May have osteoarthritis   -Follow Knee X-ray , ESR, CRP   -Topical NSAID   -PT consult

## 2021-09-29 NOTE — ED PROVIDER NOTE - NS ED MD DISPO ISOLATION TYPES
[FreeTextEntry1] : Today I had a lengthy discussion with the patient regarding their ____ pain.I have addressed all the patient's concerns surrounding the pathology of their condition. \par \par \par \par \par I would like to see the patient back in the office in ____ weeks to reassess their condition. \par \par The patient understood and verbally agreed to the treatment plan. All of their questions were answered and they were satisfied with the visit. The patient should call the office if they have any questions or experience worsening symptoms.  None

## 2021-09-29 NOTE — H&P ADULT - HISTORY OF PRESENT ILLNESS
81 year old female with h/o HTN, short term memory loss and chronic knee pain presents for weakness and knee pain. Patient is AAO x 2 to name and place at present but is not sure why she was brought to the hospital. She  reports feeling well and says that she brought to the hospital by people living in her house. Unable to reach patient's family to obtain collateral hx, hx obtained from ED provider note.   As per the note, pt was treated  for b/l LE  cellulitis about a month ago, her swelling did persist and pt started taking OTC water pills TID for the last one week, her swelling did improve, however, for the last two days pt is unable to stand or walk without assistance, at baseline pt is able to ambulate on her own, family felt that her potassium level may be low due to the water pills she was taking. In Ed, her CT chest showed: Multifocal consolidations in the right and left lower lobes. She was given IV abx

## 2021-09-29 NOTE — H&P ADULT - ASSESSMENT
81 year old female with h/o HTN, short term memory loss and chronic knee pain presents for weakness and knee pain.

## 2021-09-29 NOTE — ED PROVIDER NOTE - OBJECTIVE STATEMENT
81 year old female with h/o HTN, short term memory loss and chronic knee pain presents today accompanied with her daughter in law for evaluation for weakness and knee pain, she reports that one month ago pt was treated for a cellulitis of her bilateral LE due to redness and swelling, her swelling did persist and pt started taking OTC water pills TID for the last one week, her swelling did improve, however, for the last two days pt is unable to stand or walk without assistance, at baseline pt is able to ambulate on her own, family felt that her potassium level may be low due to the water pills she was taking, pt also c/o knee, denies recent trauman (-) chest pain (-) sob (-) nausea or vomiting (-) abdominal pain (-) cough, pt is not vaccinated

## 2021-09-29 NOTE — H&P ADULT - PROBLEM SELECTOR PLAN 2
-patient's CT chest showed: Multifocal consolidations in the right and left lower lobes, correlate clinically for pneumonia.  -Does meet SIRS criteria with WBC 13K, HR >90, though doubt if patient really has clinical PNA  -Would start with IC Rocephin and Zithromax   -Follow sputum cx and Mycoplasma

## 2021-09-29 NOTE — ED ADULT NURSE NOTE - OBJECTIVE STATEMENT
Patient presents awake, alert and oriented x3 complaining of inability to ambulate for the past few days, used to ambulate with cane. Denies trauma, pain. As per family, patient recently started taking diuretics for the past two weeks. Discoloration of lower extremities noted, with stiff texture. Family endorses infection of skin lower extremities. PMH dementia, diuretics, htn, PSH left arm, NKA

## 2021-09-29 NOTE — ED PROVIDER NOTE - CLINICAL SUMMARY MEDICAL DECISION MAKING FREE TEXT BOX
pt presented brought in by her sister in law for weakness, difficulty ambulating, found to have bilateral pna on ct, started on iv antibiotics, developed fever and given ivf and tylenol, pt admitted for further treatment

## 2021-09-29 NOTE — H&P ADULT - PROBLEM SELECTOR PLAN 4
-CT chest showed an incidental finding, Enlarged left lobe of the thyroid gland with multiple hypoattenuating nodules and small calcifications.  -Will get Thyroid US

## 2021-09-29 NOTE — H&P ADULT - NSHPPHYSICALEXAM_GEN_ALL_CORE
Vital Signs Last 24 Hrs  T(C): 37.4 (29 Sep 2021 23:15), Max: 37.9 (29 Sep 2021 21:50)  T(F): 99.3 (29 Sep 2021 23:15), Max: 100.3 (29 Sep 2021 21:50)  HR: 109 (29 Sep 2021 23:15) (109 - 113)  BP: 147/79 (29 Sep 2021 23:15) (147/79 - 176/83)  RR: 20 (29 Sep 2021 23:15) (20 - 20)  SpO2: 96% (29 Sep 2021 23:15) (91% - 96%)    CONSTITUTIONAL: elderly female in NAD   ENMT: Airway patent, Nasal mucosa clear. Mouth with normal mucosa.   EYES: Clear bilaterally, pupils equal, round and reactive to light.   CARDIAC: Normal rate, regular rhythm.  Heart sounds S1, S2.  No murmurs, rubs or gallops   RESPIRATORY: Breath sounds clear and equal bilaterally. No wheezes, rales or rhonchi  MUSCULOSKELETAL: knees with no warmth, erythema or obvious swelling   EXTREMITIES: b/l chronic venous stasis changes with skin discoloration   NEUROLOGICAL: Alert and oriented x2 to name, place , no focal deficits, no motor or sensory deficits.

## 2021-09-30 DIAGNOSIS — I10 ESSENTIAL (PRIMARY) HYPERTENSION: ICD-10-CM

## 2021-09-30 DIAGNOSIS — I87.2 VENOUS INSUFFICIENCY (CHRONIC) (PERIPHERAL): ICD-10-CM

## 2021-09-30 DIAGNOSIS — Z29.9 ENCOUNTER FOR PROPHYLACTIC MEASURES, UNSPECIFIED: ICD-10-CM

## 2021-09-30 DIAGNOSIS — E04.1 NONTOXIC SINGLE THYROID NODULE: ICD-10-CM

## 2021-09-30 DIAGNOSIS — R26.2 DIFFICULTY IN WALKING, NOT ELSEWHERE CLASSIFIED: ICD-10-CM

## 2021-09-30 DIAGNOSIS — J18.9 PNEUMONIA, UNSPECIFIED ORGANISM: ICD-10-CM

## 2021-09-30 LAB
ANION GAP SERPL CALC-SCNC: 7 MMOL/L — SIGNIFICANT CHANGE UP (ref 5–17)
BUN SERPL-MCNC: 13 MG/DL — SIGNIFICANT CHANGE UP (ref 7–23)
CALCIUM SERPL-MCNC: 8.3 MG/DL — LOW (ref 8.5–10.1)
CHLORIDE SERPL-SCNC: 107 MMOL/L — SIGNIFICANT CHANGE UP (ref 96–108)
CO2 SERPL-SCNC: 29 MMOL/L — SIGNIFICANT CHANGE UP (ref 22–31)
CREAT SERPL-MCNC: 0.56 MG/DL — SIGNIFICANT CHANGE UP (ref 0.5–1.3)
CRP SERPL-MCNC: 90 MG/L — HIGH
ERYTHROCYTE [SEDIMENTATION RATE] IN BLOOD: 34 MM/HR — HIGH (ref 0–20)
GLUCOSE SERPL-MCNC: 95 MG/DL — SIGNIFICANT CHANGE UP (ref 70–99)
GRAM STN FLD: SIGNIFICANT CHANGE UP
HCT VFR BLD CALC: 45.8 % — HIGH (ref 34.5–45)
HGB BLD-MCNC: 14.6 G/DL — SIGNIFICANT CHANGE UP (ref 11.5–15.5)
M PNEUMO IGM SER-ACNC: 0.38 INDEX — SIGNIFICANT CHANGE UP (ref 0–0.9)
MCHC RBC-ENTMCNC: 29.8 PG — SIGNIFICANT CHANGE UP (ref 27–34)
MCHC RBC-ENTMCNC: 31.9 GM/DL — LOW (ref 32–36)
MCV RBC AUTO: 93.5 FL — SIGNIFICANT CHANGE UP (ref 80–100)
MYCOPLASMA AG SPEC QL: NEGATIVE — SIGNIFICANT CHANGE UP
NRBC # BLD: 0 /100 WBCS — SIGNIFICANT CHANGE UP (ref 0–0)
PLATELET # BLD AUTO: 222 K/UL — SIGNIFICANT CHANGE UP (ref 150–400)
POTASSIUM SERPL-MCNC: 3.4 MMOL/L — LOW (ref 3.5–5.3)
POTASSIUM SERPL-SCNC: 3.4 MMOL/L — LOW (ref 3.5–5.3)
PROCALCITONIN SERPL-MCNC: 0.11 NG/ML — HIGH (ref 0.02–0.1)
RBC # BLD: 4.9 M/UL — SIGNIFICANT CHANGE UP (ref 3.8–5.2)
RBC # FLD: 13.5 % — SIGNIFICANT CHANGE UP (ref 10.3–14.5)
SODIUM SERPL-SCNC: 143 MMOL/L — SIGNIFICANT CHANGE UP (ref 135–145)
SPECIMEN SOURCE: SIGNIFICANT CHANGE UP
T3 SERPL-MCNC: 96 NG/DL — SIGNIFICANT CHANGE UP (ref 80–200)
T4 AB SER-ACNC: 6.9 UG/DL — SIGNIFICANT CHANGE UP (ref 4.6–12)
TSH SERPL-MCNC: 0.64 UIU/ML — SIGNIFICANT CHANGE UP (ref 0.36–3.74)
WBC # BLD: 10.75 K/UL — HIGH (ref 3.8–10.5)
WBC # FLD AUTO: 10.75 K/UL — HIGH (ref 3.8–10.5)

## 2021-09-30 PROCEDURE — 76536 US EXAM OF HEAD AND NECK: CPT | Mod: 26

## 2021-09-30 RX ORDER — AZITHROMYCIN 500 MG/1
500 TABLET, FILM COATED ORAL EVERY 24 HOURS
Refills: 0 | Status: DISCONTINUED | OUTPATIENT
Start: 2021-09-30 | End: 2021-09-30

## 2021-09-30 RX ORDER — METOPROLOL TARTRATE 50 MG
25 TABLET ORAL
Refills: 0 | Status: DISCONTINUED | OUTPATIENT
Start: 2021-09-30 | End: 2021-10-08

## 2021-09-30 RX ORDER — POTASSIUM CHLORIDE 20 MEQ
40 PACKET (EA) ORAL ONCE
Refills: 0 | Status: COMPLETED | OUTPATIENT
Start: 2021-09-30 | End: 2021-09-30

## 2021-09-30 RX ORDER — AZITHROMYCIN 500 MG/1
500 TABLET, FILM COATED ORAL DAILY
Refills: 0 | Status: DISCONTINUED | OUTPATIENT
Start: 2021-09-30 | End: 2021-10-07

## 2021-09-30 RX ORDER — CEFTRIAXONE 500 MG/1
1000 INJECTION, POWDER, FOR SOLUTION INTRAMUSCULAR; INTRAVENOUS EVERY 24 HOURS
Refills: 0 | Status: COMPLETED | OUTPATIENT
Start: 2021-09-30 | End: 2021-10-04

## 2021-09-30 RX ORDER — ENOXAPARIN SODIUM 100 MG/ML
40 INJECTION SUBCUTANEOUS DAILY
Refills: 0 | Status: DISCONTINUED | OUTPATIENT
Start: 2021-09-30 | End: 2021-10-08

## 2021-09-30 RX ORDER — DIPHENHYDRAMINE HCL 50 MG
25 CAPSULE ORAL ONCE
Refills: 0 | Status: COMPLETED | OUTPATIENT
Start: 2021-09-30 | End: 2021-09-30

## 2021-09-30 RX ORDER — DIPHENHYDRAMINE HCL 50 MG
25 CAPSULE ORAL ONCE
Refills: 0 | Status: DISCONTINUED | OUTPATIENT
Start: 2021-09-30 | End: 2021-09-30

## 2021-09-30 RX ADMIN — AZITHROMYCIN 500 MILLIGRAM(S): 500 TABLET, FILM COATED ORAL at 13:09

## 2021-09-30 RX ADMIN — Medication 25 MILLIGRAM(S): at 06:09

## 2021-09-30 RX ADMIN — Medication 25 MILLIGRAM(S): at 18:09

## 2021-09-30 RX ADMIN — Medication 40 MILLIEQUIVALENT(S): at 13:09

## 2021-09-30 RX ADMIN — ENOXAPARIN SODIUM 40 MILLIGRAM(S): 100 INJECTION SUBCUTANEOUS at 13:09

## 2021-09-30 RX ADMIN — Medication 25 MILLIGRAM(S): at 05:24

## 2021-09-30 RX ADMIN — CEFTRIAXONE 100 MILLIGRAM(S): 500 INJECTION, POWDER, FOR SOLUTION INTRAMUSCULAR; INTRAVENOUS at 22:42

## 2021-09-30 NOTE — PROGRESS NOTE ADULT - SUBJECTIVE AND OBJECTIVE BOX
INTERVAL HPI/OVERNIGHT EVENTS:    patient  admitted  to  my  service  by  hospitalist as  per  [facility]  protocol  patient  known to  me  from office  and  prior  hospitalization  .   81 year old female with h/o HTN, short term memory loss and chronic knee pain presents today accompanied with her daughter in law for evaluation for weakness and knee pain, she reports that one month ago pt was treated for a cellulitis of her bilateral LE due to redness and swelling, her swelling did persist and pt started taking OTC water pills TID for the last one week, her swelling did improve, however, for the last two days pt is unable to stand or walk without assistance, at baseline pt is able to ambulate on her own, family felt that her potassium level may be low due to the water pills she was taking, pt also c/o knee, denies recent trauman (-) chest pain (-) sob (-) nausea or vomiting (-) abdominal pain (-) cough, pt is not vaccinated  found  to  have  pneumonia  thyroid  nodules  admitted  for  further  w/u  and  rx      REVIEW OF SYSTEMS:  CONSTITUTIONAL:  no  complaints    NECK: No pain or stiffnes  RESPIRATORY: No SOB   CARDIOVASCULAR: No chest pain, palpitations, dizziness,   GASTROINTESTINAL: No abdominal pain. No nausea, vomiting,   NEUROLOGICAL: No headaches, no  blurry  vision no  dizziness  SKIN: No itching,   MUSCULOSKELETAL: No pain    MEDICATION:  azithromycin   Tablet 500 milliGRAM(s) Oral daily  cefTRIAXone   IVPB 1000 milliGRAM(s) IV Intermittent every 24 hours  enoxaparin Injectable 40 milliGRAM(s) SubCutaneous daily  metoprolol tartrate 25 milliGRAM(s) Oral two times a day    Vital Signs Last 24 Hrs  T(C): 36.6 (30 Sep 2021 05:39), Max: 37.9 (29 Sep 2021 21:50)  T(F): 97.8 (30 Sep 2021 05:39), Max: 100.3 (29 Sep 2021 21:50)  HR: 93 (30 Sep 2021 05:39) (84 - 113)  BP: 157/74 (30 Sep 2021 05:39) (147/79 - 176/83)  BP(mean): --  RR: 19 (30 Sep 2021 05:39) (19 - 20)  SpO2: 96% (30 Sep 2021 05:39) (91% - 99%)    PHYSICAL EXAM:  GENERAL: NAD, well-groomed, well-developed  HEENT : Conjuntivae  clear sclerae anicteric  NECK: Supple, No JVD, Normal thyroid  NERVOUS SYSTEM:  Alert oriented  x2  no  focal  deficits;   CHEST/LUNG: Clear    HEART: Regular rate and rhythm; No murmurs, rubs, or gallops  ABDOMEN: Soft, Nontender, Nondistended; Bowel sounds present  EXTREMITIES:  no  edema no  tenderness  SKIN: No rashes   LABS:                        14.6   10.75 )-----------( 222      ( 30 Sep 2021 08:09 )             45.8         143  |  107  |  13  ----------------------------<  95  3.4<L>   |  29  |  0.56    Ca    8.3<L>      30 Sep 2021 08:09    TPro  7.6  /  Alb  2.9<L>  /  TBili  1.1  /  DBili  x   /  AST  36  /  ALT  40  /  AlkPhos  71        Urinalysis Basic - ( 29 Sep 2021 22:24 )    Color: Yellow / Appearance: Clear / S.020 / pH: x  Gluc: x / Ketone: Large  / Bili: Negative / Urobili: 4 mg/dL   Blood: x / Protein: 30 mg/dL / Nitrite: Negative   Leuk Esterase: Negative / RBC: 3-5 /HPF / WBC 6-10   Sq Epi: x / Non Sq Epi: Moderate / Bacteria: Few      CAPILLARY BLOOD GLUCOSE          RADIOLOGY & ADDITIONAL TESTS:    Imaging reports  Personally Reviewed:  [ ] YES  [ ] NO    Consultant(s) Notes Reviewed:  [ x] YES  [ ] NO    Care Discussed with Consultants/Other Providers [ x] YES  [ ] NOProblem/Plan - 1:  ·  Problem: Ambulatory dysfunction.   ·  Plan: -Patient p/w knee pain and inability to ambulate   -Follow Knee X-ray , ESR, CRP   -Topical NSAID   -PT consult.    Problem/Plan - 2:  ·  Problem: Pneumonia.   ·  Plan: -patient's CT chest showed: Multifocal consolidations in the right and left lower lobes, correlate clinically for pneumonia.  -Does meet SIRS criteria with WBC 13K, HR >90, though doubt if patient really has clinical PNA  -Would start with IC Rocephin and Zithromax   -Follow sputum cx and Mycoplasma. for  pulmonary  eval    Problem/Plan - 3:  ·  Problem: Chronic venous stasis dermatitis.   ·  Plan: -Patient noted to have chronic venous stasis  changes, no s/s of infection   -Keep legs elevated.    Problem/Plan - 4:  ·  Problem: Thyroid nodule.   ·  Plan: -CT chest showed an incidental finding, Enlarged left lobe of the thyroid gland with multiple hypoattenuating nodules and small calcifications.  -Will get Thyroid US.    Problem/Plan - 5:  ·  Problem: HTN (hypertension).   ·  Plan: -Patient has hx of HTN  -EKG Sinus tach with PVCs   -Will start metoprolol.    Problem/Plan - 6:  ·  Problem: Preventive measure.   ·  Plan: Lovenox S/C.

## 2021-09-30 NOTE — PHYSICAL THERAPY INITIAL EVALUATION ADULT - FOLLOWS COMMANDS/ANSWERS QUESTIONS, REHAB EVAL
Bladder scanned. 640ml in bladder with indwelling catheter in place. Per Fausto Roberts, NP, DC martinez. Do not replace until atleast 2 hours when pt tries to void. advised to take urine sample from voided urine. Pt refusing to take lactulose at this 
time. Fausto Roberts aware. cues needed to focus on tasks/75% of the time

## 2021-09-30 NOTE — PHYSICAL THERAPY INITIAL EVALUATION ADULT - LEVEL OF INDEPENDENCE, REHAB EVAL
moderate assist (50% patients effort) GYN Fever>100.4/Bleeding that does not stop/Pain not relieved by Medications

## 2021-09-30 NOTE — PHYSICAL THERAPY INITIAL EVALUATION ADULT - ADDITIONAL COMMENTS
Pt reports she lives in a house alone with "some steps" to enter. Pt reports she was able to ambulate with a cane independently  and assist needed with ADLS from [facility]. Pt is a poor historian.

## 2021-09-30 NOTE — CONSULT NOTE ADULT - SUBJECTIVE AND OBJECTIVE BOX
Patient is a 81y old  Female who presents with a chief complaint of pneumonia (30 Sep 2021 09:17)    HPI:  81 year old female HTN, Short term memory loss and Chronic knee pain .  Presented  for weakness and knee pain.  She  reports feeling well and says that she was  brought to the hospital by people living in her house. She is a poor historian   Reported to be  treated  for b/l LE  cellulitis about a month ago, then started taking OTC water pills TID for the last one week, her swelling did improve.   However, for the last two days pt is unable to stand or walk without assistance, at baseline pt is able to ambulate on her own, family felt that her potassium level may be low due to the water pills she was taking.   IN  ED , CT chest showed: Multifocal consolidations in the right and left lower lobes.  Says she is a smoker.    PAST MEDICAL & SURGICAL HISTORY:  HTN (hypertension)    No significant past surgical history    FAMILY HISTORY:  No pertinent family history in first degree relatives      SOCIAL HISTORY: BMI (kg/m2): 31.8 ( @ 00:00)    Allergies  No Known Allergies    MEDICATIONS  (STANDING):  azithromycin   Tablet 500 milliGRAM(s) Oral daily  cefTRIAXone   IVPB 1000 milliGRAM(s) IV Intermittent every 24 hours  enoxaparin Injectable 40 milliGRAM(s) SubCutaneous daily  metoprolol tartrate 25 milliGRAM(s) Oral two times a day  potassium chloride    Tablet ER 40 milliEquivalent(s) Oral once    REVIEW OF SYSTEMS: Not able to provide.    Vital Signs Last 24 Hrs  T(C): 36.6 (30 Sep 2021 05:39), Max: 37.9 (29 Sep 2021 21:50)  T(F): 97.8 (30 Sep 2021 05:39), Max: 100.3 (29 Sep 2021 21:50)  HR: 93 (30 Sep 2021 05:39) (84 - 113)  BP: 157/74 (30 Sep 2021 05:39) (147/79 - 176/83)  BP(mean): --  RR: 19 (30 Sep 2021 05:39) (19 - 20)  SpO2: 96% (30 Sep 2021 05:39) (91% - 99%)    PHYSICAL EXAM:  GEN:         Awake, responsive and comfortable. confused  HEENT:    Normal.    RESP:        no wheezing.  CVS:          Regular rate and rhythm.   ABD:         Soft, non-tender, non-distended;   SKIN:           Warm and dry.  EXTR:            No clubbing, cyanosis or edema  CNS:           confused  PSYCH:        cooperative,    LABS:                        14.6   10.75 )-----------( 222      ( 30 Sep 2021 08:09 )             45.8         143  |  107  |  13  ----------------------------<  95  3.4<L>   |  29  |  0.56    Ca    8.3<L>      30 Sep 2021 08:09    TPro  7.6  /  Alb  2.9<L>  /  TBili  1.1  /  DBili  x   /  AST  36  /  ALT  40  /  AlkPhos  71      Urinalysis Basic - ( 29 Sep 2021 22:24 )    Color: Yellow / Appearance: Clear / S.020 / pH: x  Gluc: x / Ketone: Large  / Bili: Negative / Urobili: 4 mg/dL   Blood: x / Protein: 30 mg/dL / Nitrite: Negative   Leuk Esterase: Negative / RBC: 3-5 /HPF / WBC 6-10   Sq Epi: x / Non Sq Epi: Moderate / Bacteria: Few    EKG: sinus    RADIOLOGY & ADDITIONAL STUDIES:  < from: CT Chest No Cont (21 @ 20:48) >  EXAM:  CT CHEST                          PROCEDURE DATE:  2021      INTERPRETATION:  CLINICAL INFORMATION: weakness, evaluate for pneumonia.    COMPARISON: Comparison to numerous prior examinations most recent on 2021    CONTRAST/COMPLICATIONS:  IV Contrast: None  Oral Contrast: None  Complications: None    PROCEDURE:  CT of the Chest was performed.  Sagittal and coronal reformats were performed.    FINDINGS:    LUNGS AND AIRWAYS: Patent central airways.  Multifocal consolidations in the right lower and left lower lobes. Subsegmental atelectasis versus scarring bilateral upper lobes and bilateral lower lobes.  PLEURA: No pleural effusion.  MEDIASTINUM AND ABNER: No lymphadenopathy.  VESSELS: Atherosclerotic calcifications ofthe aorta and coronary arteries.  HEART: Heart size is normal. No pericardial effusion.  CHEST WALL AND LOWER NECK: Status post left mastectomy and left axillary dissection. Enlarged left lobe of the thyroid with multiple hypoattenuating nodules and small calcifications.  VISUALIZED UPPER ABDOMEN: Moderate size hiatal hernia. Simple cyst in the left kidney measuring 2.4 cm.  BONES: Degenerative changes. Small sclerotic focus at the right glenoid (7, 6).    IMPRESSION:    Multifocal consolidationsin the right and left lower lobes, correlate clinically for pneumonia.    Enlarged left lobe of the thyroid gland with multiple hypoattenuating nodules and small calcifications, recommend nonemergent thyroid ultrasound.    Small sclerotic focus in the right glenoid, recommend bone scan for further evaluation.    NERY SCHERER DO; Attending Radiologist  This document has been electronically signed. Sep 29 2021  9:41PM    ASSESSMENT AND PLAN:  ·	Multifocal pneumonia.  ·	Hypokalemia.  ·	HTN.  ·	Obesity.    SPO2 96% on nasal O2..  Continue antibiotics.  Will get procalcitonin.  DVT prophylaxis.

## 2021-09-30 NOTE — PATIENT PROFILE ADULT - NSTRANSFERBELONGINGSRESP_GEN_A_NUR
Problem: Falls - Risk of:  Goal: Will remain free from falls  Description  Will remain free from falls  4/29/2019 1757 by Babatunde Cisneros RN  Outcome: Completed     Problem: Falls - Risk of:  Goal: Absence of physical injury  Description  Absence of physical injury  4/29/2019 1757 by Babatunde Cisneros RN  Outcome: Completed     Problem: Pain:  Goal: Pain level will decrease  Description  Pain level will decrease  4/29/2019 1757 by Babatunde Cisneros RN  Outcome: Completed     Problem: Pain:  Goal: Control of acute pain  Description  Control of acute pain  4/29/2019 1757 by Babatunde Cisneros RN  Outcome: Completed     Problem: Pain:  Goal: Control of chronic pain  Description  Control of chronic pain  4/29/2019 1757 by Babatunde Cisneros RN  Outcome: Completed yes

## 2021-10-01 LAB
-  COAGULASE NEGATIVE STAPHYLOCOCCUS: SIGNIFICANT CHANGE UP
ALBUMIN SERPL ELPH-MCNC: 2.4 G/DL — LOW (ref 3.3–5)
ALP SERPL-CCNC: 57 U/L — SIGNIFICANT CHANGE UP (ref 40–120)
ALT FLD-CCNC: 24 U/L — SIGNIFICANT CHANGE UP (ref 12–78)
ANION GAP SERPL CALC-SCNC: 4 MMOL/L — LOW (ref 5–17)
AST SERPL-CCNC: 19 U/L — SIGNIFICANT CHANGE UP (ref 15–37)
BILIRUB SERPL-MCNC: 0.6 MG/DL — SIGNIFICANT CHANGE UP (ref 0.2–1.2)
BUN SERPL-MCNC: 17 MG/DL — SIGNIFICANT CHANGE UP (ref 7–23)
CALCIUM SERPL-MCNC: 8.9 MG/DL — SIGNIFICANT CHANGE UP (ref 8.5–10.1)
CHLORIDE SERPL-SCNC: 106 MMOL/L — SIGNIFICANT CHANGE UP (ref 96–108)
CO2 SERPL-SCNC: 29 MMOL/L — SIGNIFICANT CHANGE UP (ref 22–31)
COVID-19 SPIKE DOMAIN AB INTERP: NEGATIVE — SIGNIFICANT CHANGE UP
COVID-19 SPIKE DOMAIN ANTIBODY RESULT: 0.4 U/ML — SIGNIFICANT CHANGE UP
CREAT SERPL-MCNC: 0.41 MG/DL — LOW (ref 0.5–1.3)
CULTURE RESULTS: SIGNIFICANT CHANGE UP
CULTURE RESULTS: SIGNIFICANT CHANGE UP
GLUCOSE SERPL-MCNC: 107 MG/DL — HIGH (ref 70–99)
GRAM STN FLD: SIGNIFICANT CHANGE UP
HCT VFR BLD CALC: 46.2 % — HIGH (ref 34.5–45)
HGB BLD-MCNC: 14.7 G/DL — SIGNIFICANT CHANGE UP (ref 11.5–15.5)
MCHC RBC-ENTMCNC: 29.8 PG — SIGNIFICANT CHANGE UP (ref 27–34)
MCHC RBC-ENTMCNC: 31.8 GM/DL — LOW (ref 32–36)
MCV RBC AUTO: 93.5 FL — SIGNIFICANT CHANGE UP (ref 80–100)
METHOD TYPE: SIGNIFICANT CHANGE UP
NRBC # BLD: 0 /100 WBCS — SIGNIFICANT CHANGE UP (ref 0–0)
ORGANISM # SPEC MICROSCOPIC CNT: SIGNIFICANT CHANGE UP
ORGANISM # SPEC MICROSCOPIC CNT: SIGNIFICANT CHANGE UP
PLATELET # BLD AUTO: 196 K/UL — SIGNIFICANT CHANGE UP (ref 150–400)
POTASSIUM SERPL-MCNC: 4.1 MMOL/L — SIGNIFICANT CHANGE UP (ref 3.5–5.3)
POTASSIUM SERPL-SCNC: 4.1 MMOL/L — SIGNIFICANT CHANGE UP (ref 3.5–5.3)
PROCALCITONIN SERPL-MCNC: 0.06 NG/ML — SIGNIFICANT CHANGE UP (ref 0.02–0.1)
PROT SERPL-MCNC: 6.8 GM/DL — SIGNIFICANT CHANGE UP (ref 6–8.3)
RBC # BLD: 4.94 M/UL — SIGNIFICANT CHANGE UP (ref 3.8–5.2)
RBC # FLD: 13.6 % — SIGNIFICANT CHANGE UP (ref 10.3–14.5)
SARS-COV-2 IGG+IGM SERPL QL IA: 0.4 U/ML — SIGNIFICANT CHANGE UP
SARS-COV-2 IGG+IGM SERPL QL IA: NEGATIVE — SIGNIFICANT CHANGE UP
SODIUM SERPL-SCNC: 139 MMOL/L — SIGNIFICANT CHANGE UP (ref 135–145)
SPECIMEN SOURCE: SIGNIFICANT CHANGE UP
SPECIMEN SOURCE: SIGNIFICANT CHANGE UP
WBC # BLD: 11.38 K/UL — HIGH (ref 3.8–10.5)
WBC # FLD AUTO: 11.38 K/UL — HIGH (ref 3.8–10.5)

## 2021-10-01 PROCEDURE — 93970 EXTREMITY STUDY: CPT | Mod: 26

## 2021-10-01 RX ORDER — LACTOBACILLUS ACIDOPHILUS 100MM CELL
1 CAPSULE ORAL
Refills: 0 | Status: DISCONTINUED | OUTPATIENT
Start: 2021-10-01 | End: 2021-10-08

## 2021-10-01 RX ORDER — VANCOMYCIN HCL 1 G
VIAL (EA) INTRAVENOUS
Refills: 0 | Status: DISCONTINUED | OUTPATIENT
Start: 2021-10-01 | End: 2021-10-04

## 2021-10-01 RX ORDER — VANCOMYCIN HCL 1 G
1000 VIAL (EA) INTRAVENOUS ONCE
Refills: 0 | Status: COMPLETED | OUTPATIENT
Start: 2021-10-01 | End: 2021-10-01

## 2021-10-01 RX ORDER — VANCOMYCIN HCL 1 G
1000 VIAL (EA) INTRAVENOUS EVERY 12 HOURS
Refills: 0 | Status: DISCONTINUED | OUTPATIENT
Start: 2021-10-02 | End: 2021-10-04

## 2021-10-01 RX ADMIN — AZITHROMYCIN 500 MILLIGRAM(S): 500 TABLET, FILM COATED ORAL at 11:36

## 2021-10-01 RX ADMIN — Medication 25 MILLIGRAM(S): at 17:35

## 2021-10-01 RX ADMIN — Medication 25 MILLIGRAM(S): at 05:32

## 2021-10-01 RX ADMIN — Medication 1 TABLET(S): at 17:57

## 2021-10-01 RX ADMIN — ENOXAPARIN SODIUM 40 MILLIGRAM(S): 100 INJECTION SUBCUTANEOUS at 11:36

## 2021-10-01 RX ADMIN — CEFTRIAXONE 100 MILLIGRAM(S): 500 INJECTION, POWDER, FOR SOLUTION INTRAMUSCULAR; INTRAVENOUS at 22:05

## 2021-10-01 RX ADMIN — Medication 250 MILLIGRAM(S): at 20:57

## 2021-10-01 NOTE — PROGRESS NOTE ADULT - SUBJECTIVE AND OBJECTIVE BOX
INTERVAL HPI:  81 year old female HTN, Short term memory loss and Chronic knee pain .  Presented  for weakness and knee pain.  She  reports feeling well and says that she was  brought to the hospital by people living in her house. She is a poor historian   Reported to be  treated  for b/l LE  cellulitis about a month ago, then started taking OTC water pills TID for the last one week, her swelling did improve.   However, for the last two days pt is unable to stand or walk without assistance, at baseline pt is able to ambulate on her own, family felt that her potassium level may be low due to the water pills she was taking.   IN  ED , CT chest showed: Multifocal consolidations in the right and left lower lobes.  Says she is a smoker.    OVERNIGHT EVENTS:  Awake and comfortable.    Vital Signs Last 24 Hrs  T(C): 36.9 (01 Oct 2021 10:36), Max: 37.3 (01 Oct 2021 00:11)  T(F): 98.5 (01 Oct 2021 10:36), Max: 99.1 (01 Oct 2021 00:11)  HR: 89 (01 Oct 2021 10:36) (75 - 130)  BP: 153/81 (01 Oct 2021 10:36) (131/75 - 163/78)  BP(mean): 105 (01 Oct 2021 10:36) (105 - 105)  RR: 18 (01 Oct 2021 10:36) (17 - 18)  SpO2: 99% (01 Oct 2021 10:36) (88% - 99%)    PHYSICAL EXAM:  GEN:         Awake, responsive and comfortable.  HEENT:    Normal.    RESP:      no wheezing.  CVS:          Regular rate and rhythm.   ABD:         Soft, non-tender, non-distended;     MEDICATIONS  (STANDING):  azithromycin   Tablet 500 milliGRAM(s) Oral daily  cefTRIAXone   IVPB 1000 milliGRAM(s) IV Intermittent every 24 hours  enoxaparin Injectable 40 milliGRAM(s) SubCutaneous daily  metoprolol tartrate 25 milliGRAM(s) Oral two times a day    LABS:                        14.7   11.38 )-----------( 196      ( 01 Oct 2021 07:37 )             46.2     10    139  |  106  |  17  ----------------------------<  107<H>  4.1   |  29  |  0.41<L>    Ca    8.9      01 Oct 2021 07:37    TPro  6.8  /  Alb  2.4<L>  /  TBili  0.6  /  DBili  x   /  AST  19  /  ALT  24  /  AlkPhos  57  10-    Urinalysis Basic - ( 29 Sep 2021 22:24 )    Color: Yellow / Appearance: Clear / S.020 / pH: x  Gluc: x / Ketone: Large  / Bili: Negative / Urobili: 4 mg/dL   Blood: x / Protein: 30 mg/dL / Nitrite: Negative   Leuk Esterase: Negative / RBC: 3-5 /HPF / WBC 6-10   Sq Epi: x / Non Sq Epi: Moderate / Bacteria: Few    ASSESSMENT AND PLAN:  ·	Multifocal pneumonia.  ·	Hypokalemia.  ·	HTN.  ·	Obesity.    SPO2 92% on room air.  Procalcitonin is normal.  Continue O2.  On empiric antibiotics.

## 2021-10-01 NOTE — CONSULT NOTE ADULT - SUBJECTIVE AND OBJECTIVE BOX
HPI:  80 y/o white female with hx of HTN, Lt. Breast Ca, s/p Lt. Mastectomy and Lymph Node Dissection, s/p Cholecystectomy, s/p Lt Elbow Fx with dislocation requiring Lt Elbow ORIF in  at [facility], recent Cellulitis of the LE's requiring ab rx about 1 month ago,  reported Short-term memory loss, chronic knee pain, admitted via the ER on 21 with c/o increasing difficulty ambulating.  Patient reportedly was rx'ed as well with a diuretic for some persistent LE swelling after the ab rx was completed for her Cellulitis with apparent improvement in the swelling.  In the ER patient was noted to have increased temp to 100.3 and w/u with Xrays of both knees revealed no acute pathology, but CXR and subsequent CT of the Chest ( non-contrast ) revealed Multifocal bilateral Lower Lobe Consolidations, an enlarged Lt Lobe of the Thyroid, and  a small sclerotic focus in the Rt. Glenoid.  Patient was COVID-19 and Influenza A And B Negative by PCR and Blood and Urine cx's were obtained and she was begun empirically on Rocephin + po Zithromax.  Further w/u with Thyroid Sono was done and now reported with an enlarged Multinodular Thyroid with consideration for Bx of the dominant nodule for tissue dx.  Now patient also reported with 1 out of 2 Blood Cx's growing  Gm Positive Cocci in clusters in the aerobic bottle with preliminary identification of CNS now by PCR.  Patient denies any fevers or chills but does admit to an intermittent productive cough although she is unable to expectorate the mucous but she does admit to swallowing it.  No c/o N/V/D and no c/o SOB or cp.  Patient denies any difficulty swallowing but is confused regarding any further details of her hx.  Initial WBC's were 13,880 and noted with elevated CRP and the Lactate was 1.4.         Allergies     No Known Allergies    Intolerances - none        Social History:  Tobacco: + remote hx  Alcohol: negative  Recent Travel: none  Immunizations: Patient hs NOT rec'd the COVID-19 Vaccine  Lives at home with her family - ? whom ( patient informed me that she lived with her parents and grandparents and other family members )  Pets - ? cats currently - reports previously had cats and dogs  Ambulated at home previously without any assistance      MEDICATIONS  (STANDING):  azithromycin   Tablet 500 milliGRAM(s) Oral daily  cefTRIAXone   IVPB 1000 milliGRAM(s) IV Intermittent every 24 hours  enoxaparin Injectable 40 milliGRAM(s) SubCutaneous daily  metoprolol tartrate 25 milliGRAM(s) Oral two times a day    MEDICATIONS  (PRN):      LABS:  CBC Full  -  ( 01 Oct 2021 07:37 )  WBC Count : 11.38 K/uL  RBC Count : 4.94 M/uL  Hemoglobin : 14.7 g/dL  Hematocrit : 46.2 %  Platelet Count - Automated : 196 K/uL  Mean Cell Volume : 93.5 fl  Mean Cell Hemoglobin : 29.8 pg  Mean Cell Hemoglobin Concentration : 31.8 gm/dL  Auto Neutrophil # : x  Auto Lymphocyte # : x  Auto Monocyte # : x  Auto Eosinophil # : x  Auto Basophil # : x  Auto Neutrophil % : x  Auto Lymphocyte % : x  Auto Monocyte % : x  Auto Eosinophil % : x  Auto Basophil % : x    10    139  |  106  |  17  ----------------------------<  107<H>  4.1   |  29  |  0.41<L>    Ca    8.9      01 Oct 2021 07:37    TPro  6.8  /  Alb  2.4<L>  /  TBili  0.6  /  DBili  x   /  AST  19  /  ALT  24  /  AlkPhos  57  10-    LIVER FUNCTIONS - ( 01 Oct 2021 07:37 )  Alb: 2.4 g/dL / Pro: 6.8 gm/dL / ALK PHOS: 57 U/L / ALT: 24 U/L / AST: 19 U/L / GGT: x           Urinalysis Basic - ( 29 Sep 2021 22:24 )  Color: Yellow / Appearance: Clear / S.020 / pH: x  Gluc: x / Ketone: Large  / Bili: Negative / Urobili: 4 mg/dL   Blood: x / Protein: 30 mg/dL / Nitrite: Negative   Leuk Esterase: Negative / RBC: 3-5 /HPF / WBC 6-10   Sq Epi: x / Non Sq Epi: Moderate / Bacteria: Few    Sedimentation Rate, Erythrocyte: 34 mm/hr ( @ 08:09)    C-Reactive Protein, Serum (21 @ 12:58)    C-Reactive Protein, Serum: 90 mg/L    Procalcitonin, Serum (10.01.21 @ 11:05)    Procalcitonin, Serum: 0.06:     Procalcitonin, Serum (21 @ 15:28)    Procalcitonin, Serum: 0.11:     Lactate, Blood (21 @ 22:24)    Lactate, Blood: 1.4 mmol/L    Mycoplasma Pneumoniae IgM Antibody (21 @ 12:41)    Mycoplasma IgM AB: 0.38 Index    Mycoplasma: Negative: Method USAMA           Interpretation:                                             Index                  Negative              <=0.90                  Equivocal            0.91-1.09                  Positive                >=1.10        MICROBIOLOGY:    COVID-19 Shahab Domain Antibody (10.01.21 @ 10:46)    COVID-19 Shahab Domain Antibody Result: 0.40: Roche ECLIA Total AB (KIRSTIN)  NOTE: This result index represents a total antibody measurement, which  includes IgG, IgA and IgM.  Measures Receptor Binding Domain of the Shahab Protein  Negative <= 0.79 U/mL  Positive  >= 0.80 U/mL U/mL    COVID-19 Shahab Domain AB Interp: Negative: This test has been authorized for emergency use by the FDA    Specimen Source: .Blood Blood ( @ 09:04)  Culture Results:   Growth in aerobic bottle: Gram Positive Cocci in Clusters  Culture - Blood (21 @ 09:04)    Gram Stain:   Growth in aerobic bottle: Gram Positive Cocci in Clusters    -  Coagulase negative Staphylococcus: Detec    Specimen Source: .Blood Blood    Organism Identification: Blood Culture PCR    Method Type: PCR    Specimen Source: .Blood Blood ( @ 09:04)  Culture Results:   No growth to date. ( @ 09:04)    Specimen Source: Clean Catch Clean Catch (Midstream) ( @ 08:57)  Culture Results:   <10,000 CFU/mL Normal Urogenital Nikki ( @ 08:57)      Flu With COVID-19 By VONDA (21 @ 16:52)    SARS-CoV-2 Result: NotDetec: EUA/IVD  This Respiratory Panel uses polymerase chain reaction (PCR) to detect for  influenza A; influenza B; and SARS-CoV-2.    Influenza A Result: NotDetec: EUA/IVD    Influenza B Result: NotDetec: EUA/IVD      Radiology:       < from: US Thyroid (21 @ 08:42) >  EXAM:  US THYROID ONLY                        PROCEDURE DATE:  2021    INTERPRETATION:  Indication: Abnormal thyroid on CT of 2021.  Bilateral thyroid ultrasound.  Right thyroid lobe 6.2 x 3.4 x 3.4 cm. Left thyroid lobe 6.1 x 3.9 x 3.9 cm. Isthmus measures 0.54 cm AP. Multiple bilateral solid nodules, some with calcifications. The dominant nodule in the right lower pole measures 1.6 cm.    IMPRESSION: Enlarged multinodular thyroid as discussed. Consider biopsy of the dominant nodule for tissue specific diagnosis.            < from: CT Chest No Cont (21 @ 20:48) >  EXAM:  CT CHEST                        PROCEDURE DATE:  2021    INTERPRETATION:  CLINICAL INFORMATION: weakness, evaluate for pneumonia.  COMPARISON: Comparison to numerous prior examinations most recent on 2021  CONTRAST/COMPLICATIONS:  IV Contrast: None  Oral Contrast: None  Complications: None  PROCEDURE:  CT of the Chest was performed.  Sagittal and coronal reformats were performed.  FINDINGS:    LUNGS AND AIRWAYS: Patent central airways.  Multifocal consolidations in the right lower and left lower lobes. Subsegmental atelectasis versus scarring bilateral upper lobes and bilateral lower lobes.  PLEURA: No pleural effusion.  MEDIASTINUM AND ABNER: No lymphadenopathy.  VESSELS: Atherosclerotic calcifications ofthe aorta and coronary arteries.  HEART: Heart size is normal. No pericardial effusion.  CHEST WALL AND LOWER NECK: Status post left mastectomy and left axillary dissection. Enlarged left lobe of the thyroid with multiple hypoattenuating nodules and small calcifications.  VISUALIZED UPPER ABDOMEN: Moderate size hiatal hernia. Simple cyst in the left kidney measuring 2.4 cm.  BONES: Degenerative changes. Small sclerotic focus at the right glenoid (7, 6).    IMPRESSION:    Multifocal consolidations in the right and left lower lobes, correlate clinically for pneumonia.    Enlarged left lobe of the thyroid gland with multiple hypoattenuating nodules and small calcifications, recommend nonemergent thyroid ultrasound.    Small sclerotic focus in the right glenoid, recommend bone scan for further evaluation.                   < from: Xray Knee 3 Views, Bilateral (21 @ 16:41) >  EXAM:  XR KNEE 3 VIEWS BI                        PROCEDURE DATE:  2021    INTERPRETATION:  Bilateral knees  HISTORY:Difficult ambulation for two days   Three views of the right knee and three views of the left knee show no evidence of acute fracture nor destructive change. The joint spaces show mild arthritic changes of the medial compartments with calcified menisci bilaterally. Vascular calcifications are present. Small nodule of the lower right thigh may be in the skin asit is not visualized in the right lateral image.    IMPRESSION: No acute bony pathology.    Note - This does not exclude fractures in perfect alignment and apposition as presence may be indicated at one to three weeks following callus formation.    < from: Xray Chest 1 View- PORTABLE-Urgent (Xray Chest 1 View- PORTABLE-Urgent .) (21 @ 16:40) >  EXAM:  XR CHEST PORTABLE URGENT 1V                        PROCEDURE DATE:  2021    INTERPRETATION:  Chest one view  HISTORY: Weakness  COMPARISON STUDY: 2019  Frontal expiratory view of the chest shows the heart to be similar in size. The lungs show bilateral patchy infiltrates, right more than left and there is no evidence of pneumothorax nor pleural effusion.    IMPRESSION:  Patchy infiltrates.  Further information may be obtained from the patient's subsequent CT of the chest.        Vital Signs Last 24 Hrs  T(C): 36.9 (01 Oct 2021 10:36), Max: 37.3 (01 Oct 2021 00:11)  T(F): 98.5 (01 Oct 2021 10:36), Max: 99.1 (01 Oct 2021 00:11)  HR: 89 (01 Oct 2021 10:36) (75 - 130)  BP: 153/81 (01 Oct 2021 10:36) (131/75 - 163/78)  BP(mean): 105 (01 Oct 2021 10:36) (105 - 105)  RR: 18 (01 Oct 2021 10:36) (17 - 18)  SpO2: 99% (01 Oct 2021 10:36) (88% - 99%)  Supplemental O2: on RA     PHYSICAL EXAM    General: 80 y/o white female awake, alert, sitting upright in bed now, pleasant and cooperative, but very confused to time, past and recent events/ hx,  although in NAD  HEENT:  conj pink, sclerae anicteric, PERRLA, no oral lesions noted, mucosa slightly dry  Neck: supple, no nodes noted  Heart: RR  Lungs: decreased BS bilaterally at the bases with few rhonchi noted in the mid-lung fields  Abdomen: BS+, semi-soft, nontender to palpation, well-healed RUQ scar, well-healed lower abdominal scar, no masses or HS-megaly detected  Back: no CVA or Spinal tenderness elicited to palpation  Extremities: 1+ edema both LE's with mild warmth and erythema from above the ankle to about 1/3 up the calf with some dry scaling skin noted - tenderness to palpation in the Lt. calf                     well-healed scar of the Lt. Elbow  Skin: warm, dry, no rash        I&O's Summary :    30 Sep 2021 07:01  -  01 Oct 2021 07:00  --------------------------------------------------------  IN: 50 mL / OUT: 1000 mL / NET: -950 mL        Impression:  80 y/o white female with hx of HTN, Lt. Breast Ca, s/p Lt. Mastectomy with Lymph Node Dissection,  s/p Cholecystectomy, s/p Fx/ Dislocation of the Lt Elbow requiring Lt. Elbow ORIF in  at [facility], recent Cellulitis of the LE's requiring ab rx about 1 month ago and subsequent rx with a diuretic for continued swelling of the LE's with ultimate improvement,  reported Short-term memory loss, chronic knee pain, admitted via the ER on 21 with c/o difficulty ambulating and w/u revealed a fever to 100.3, leukocytosis, and Multifocal Bilateral Lower Lobe Consolidations on CXR and CT of the Chest along with an enlarged/ multi-nodular Thyroid gland and a Sclerotic area in the Rt. Glenoid.  Patient was begun on Rocephin + Zithromax po and now with 1 blood Cx out of 2 growing Gm Positive Cocci in Clusters with preliminary identification of CNS by PCR.  Sepsis presumably due to Multifocal Bilateral lower Lobe PNA and ? CNS  Bacteremia may represent a contaminant, however patient has underlying hardware in the Lt Elbow along with apparent ongoing/ recurrent Cellulitis of the LE's.    Suggestions:  Will therefore continue current ab rx with Rocephin + Zithromax and add Vanco pending further Blood Cx results.  Will request Urine for Legionella Ag and Venous Doppler of the LE's to further evaluate.  Follow-up temps and labs closely.  Discussed with patient at the bedside.  When patient is clinically improved would recommend COVID-19 Vaccination if she is amenable to this.  Thanks, will follow with you. HPI:  82 y/o white female with hx of HTN, Lt. Breast Ca, s/p Lt. Mastectomy and Lymph Node Dissection, s/p Cholecystectomy, s/p Lt Elbow Fx with dislocation requiring Lt Elbow ORIF in  at [facility], recent Cellulitis of the LE's requiring ab rx about 1 month ago,  reported Short-term memory loss, chronic knee pain, admitted via the ER on 21 with c/o increasing difficulty ambulating.  Patient reportedly was rx'ed as well with a diuretic for some persistent LE swelling after the ab rx was completed for her Cellulitis with apparent improvement in the swelling.  In the ER patient was noted to have increased temp to 100.3 and w/u with Xrays of both knees revealed no acute pathology, but CXR and subsequent CT of the Chest ( non-contrast ) revealed Multifocal bilateral Lower Lobe Consolidations, an enlarged Lt Lobe of the Thyroid, and  a small sclerotic focus in the Rt. Glenoid.  Patient was COVID-19 and Influenza A And B Negative by PCR and Blood and Urine cx's were obtained and she was begun empirically on Rocephin + po Zithromax.  Further w/u with Thyroid Sono was done and now reported with an enlarged Multinodular Thyroid with consideration for Bx of the dominant nodule for tissue dx.  Now patient also reported with 1 out of 2 Blood Cx's growing  Gm Positive Cocci in clusters in the aerobic bottle with preliminary identification of CNS now by PCR.  Patient denies any fevers or chills but does admit to an intermittent productive cough although she is unable to expectorate the mucous but she does admit to swallowing it.  No c/o N/V/D and no c/o SOB or cp.  Patient denies any difficulty swallowing but is confused regarding any further details of her hx.  Initial WBC's were 13,880 and noted with elevated CRP and the Lactate was 1.4.         Allergies     No Known Allergies    Intolerances - none        Social History:  Tobacco: + remote hx  Alcohol: negative  Recent Travel: none  Immunizations: Patient has NOT rec'd the COVID-19 Vaccine  Lives at home with her family - ? whom ( patient informed me that she lived with her parents and grandparents and other family members )  Pets - ? cats currently - reports previously had cats and dogs  Ambulated at home previously without any assistance      MEDICATIONS  (STANDING):  azithromycin   Tablet 500 milliGRAM(s) Oral daily  cefTRIAXone   IVPB 1000 milliGRAM(s) IV Intermittent every 24 hours  enoxaparin Injectable 40 milliGRAM(s) SubCutaneous daily  metoprolol tartrate 25 milliGRAM(s) Oral two times a day    MEDICATIONS  (PRN):      LABS:  CBC Full  -  ( 01 Oct 2021 07:37 )  WBC Count : 11.38 K/uL  RBC Count : 4.94 M/uL  Hemoglobin : 14.7 g/dL  Hematocrit : 46.2 %  Platelet Count - Automated : 196 K/uL  Mean Cell Volume : 93.5 fl  Mean Cell Hemoglobin : 29.8 pg  Mean Cell Hemoglobin Concentration : 31.8 gm/dL  Auto Neutrophil # : x  Auto Lymphocyte # : x  Auto Monocyte # : x  Auto Eosinophil # : x  Auto Basophil # : x  Auto Neutrophil % : x  Auto Lymphocyte % : x  Auto Monocyte % : x  Auto Eosinophil % : x  Auto Basophil % : x    10    139  |  106  |  17  ----------------------------<  107<H>  4.1   |  29  |  0.41<L>    Ca    8.9      01 Oct 2021 07:37    TPro  6.8  /  Alb  2.4<L>  /  TBili  0.6  /  DBili  x   /  AST  19  /  ALT  24  /  AlkPhos  57  10-    LIVER FUNCTIONS - ( 01 Oct 2021 07:37 )  Alb: 2.4 g/dL / Pro: 6.8 gm/dL / ALK PHOS: 57 U/L / ALT: 24 U/L / AST: 19 U/L / GGT: x           Urinalysis Basic - ( 29 Sep 2021 22:24 )  Color: Yellow / Appearance: Clear / S.020 / pH: x  Gluc: x / Ketone: Large  / Bili: Negative / Urobili: 4 mg/dL   Blood: x / Protein: 30 mg/dL / Nitrite: Negative   Leuk Esterase: Negative / RBC: 3-5 /HPF / WBC 6-10   Sq Epi: x / Non Sq Epi: Moderate / Bacteria: Few    Sedimentation Rate, Erythrocyte: 34 mm/hr ( @ 08:09)    C-Reactive Protein, Serum (21 @ 12:58)    C-Reactive Protein, Serum: 90 mg/L    Procalcitonin, Serum (10.01.21 @ 11:05)    Procalcitonin, Serum: 0.06:     Procalcitonin, Serum (21 @ 15:28)    Procalcitonin, Serum: 0.11:     Lactate, Blood (21 @ 22:24)    Lactate, Blood: 1.4 mmol/L    Mycoplasma Pneumoniae IgM Antibody (21 @ 12:41)    Mycoplasma IgM AB: 0.38 Index    Mycoplasma: Negative: Method USAMA           Interpretation:                                             Index                  Negative              <=0.90                  Equivocal            0.91-1.09                  Positive                >=1.10        MICROBIOLOGY:    COVID-19 Shahab Domain Antibody (10.01.21 @ 10:46)    COVID-19 Shahab Domain Antibody Result: 0.40: Roche ECLIA Total AB (KIRSTIN)  NOTE: This result index represents a total antibody measurement, which  includes IgG, IgA and IgM.  Measures Receptor Binding Domain of the Shahab Protein  Negative <= 0.79 U/mL  Positive  >= 0.80 U/mL U/mL    COVID-19 Shahab Domain AB Interp: Negative: This test has been authorized for emergency use by the FDA    Specimen Source: .Blood Blood ( @ 09:04)  Culture Results:   Growth in aerobic bottle: Gram Positive Cocci in Clusters  Culture - Blood (21 @ 09:04)    Gram Stain:   Growth in aerobic bottle: Gram Positive Cocci in Clusters    -  Coagulase negative Staphylococcus: Detec    Specimen Source: .Blood Blood    Organism Identification: Blood Culture PCR    Method Type: PCR    Specimen Source: .Blood Blood ( @ 09:04)  Culture Results:   No growth to date. ( @ 09:04)    Specimen Source: Clean Catch Clean Catch (Midstream) ( @ 08:57)  Culture Results:   <10,000 CFU/mL Normal Urogenital Nikki ( @ 08:57)      Flu With COVID-19 By VONDA (21 @ 16:52)    SARS-CoV-2 Result: NotDetec: EUA/IVD  This Respiratory Panel uses polymerase chain reaction (PCR) to detect for  influenza A; influenza B; and SARS-CoV-2.    Influenza A Result: NotDetec: EUA/IVD    Influenza B Result: NotDetec: EUA/IVD      Radiology:       < from: US Thyroid (21 @ 08:42) >  EXAM:  US THYROID ONLY                        PROCEDURE DATE:  2021    INTERPRETATION:  Indication: Abnormal thyroid on CT of 2021.  Bilateral thyroid ultrasound.  Right thyroid lobe 6.2 x 3.4 x 3.4 cm. Left thyroid lobe 6.1 x 3.9 x 3.9 cm. Isthmus measures 0.54 cm AP. Multiple bilateral solid nodules, some with calcifications. The dominant nodule in the right lower pole measures 1.6 cm.    IMPRESSION: Enlarged multinodular thyroid as discussed. Consider biopsy of the dominant nodule for tissue specific diagnosis.            < from: CT Chest No Cont (21 @ 20:48) >  EXAM:  CT CHEST                        PROCEDURE DATE:  2021    INTERPRETATION:  CLINICAL INFORMATION: weakness, evaluate for pneumonia.  COMPARISON: Comparison to numerous prior examinations most recent on 2021  CONTRAST/COMPLICATIONS:  IV Contrast: None  Oral Contrast: None  Complications: None  PROCEDURE:  CT of the Chest was performed.  Sagittal and coronal reformats were performed.  FINDINGS:    LUNGS AND AIRWAYS: Patent central airways.  Multifocal consolidations in the right lower and left lower lobes. Subsegmental atelectasis versus scarring bilateral upper lobes and bilateral lower lobes.  PLEURA: No pleural effusion.  MEDIASTINUM AND ABNER: No lymphadenopathy.  VESSELS: Atherosclerotic calcifications ofthe aorta and coronary arteries.  HEART: Heart size is normal. No pericardial effusion.  CHEST WALL AND LOWER NECK: Status post left mastectomy and left axillary dissection. Enlarged left lobe of the thyroid with multiple hypoattenuating nodules and small calcifications.  VISUALIZED UPPER ABDOMEN: Moderate size hiatal hernia. Simple cyst in the left kidney measuring 2.4 cm.  BONES: Degenerative changes. Small sclerotic focus at the right glenoid (7, 6).    IMPRESSION:    Multifocal consolidations in the right and left lower lobes, correlate clinically for pneumonia.    Enlarged left lobe of the thyroid gland with multiple hypoattenuating nodules and small calcifications, recommend nonemergent thyroid ultrasound.    Small sclerotic focus in the right glenoid, recommend bone scan for further evaluation.                   < from: Xray Knee 3 Views, Bilateral (21 @ 16:41) >  EXAM:  XR KNEE 3 VIEWS BI                        PROCEDURE DATE:  2021    INTERPRETATION:  Bilateral knees  HISTORY:Difficult ambulation for two days   Three views of the right knee and three views of the left knee show no evidence of acute fracture nor destructive change. The joint spaces show mild arthritic changes of the medial compartments with calcified menisci bilaterally. Vascular calcifications are present. Small nodule of the lower right thigh may be in the skin asit is not visualized in the right lateral image.    IMPRESSION: No acute bony pathology.    Note - This does not exclude fractures in perfect alignment and apposition as presence may be indicated at one to three weeks following callus formation.    < from: Xray Chest 1 View- PORTABLE-Urgent (Xray Chest 1 View- PORTABLE-Urgent .) (21 @ 16:40) >  EXAM:  XR CHEST PORTABLE URGENT 1V                        PROCEDURE DATE:  2021    INTERPRETATION:  Chest one view  HISTORY: Weakness  COMPARISON STUDY: 2019  Frontal expiratory view of the chest shows the heart to be similar in size. The lungs show bilateral patchy infiltrates, right more than left and there is no evidence of pneumothorax nor pleural effusion.    IMPRESSION:  Patchy infiltrates.  Further information may be obtained from the patient's subsequent CT of the chest.        Vital Signs Last 24 Hrs  T(C): 36.9 (01 Oct 2021 10:36), Max: 37.3 (01 Oct 2021 00:11)  T(F): 98.5 (01 Oct 2021 10:36), Max: 99.1 (01 Oct 2021 00:11)  HR: 89 (01 Oct 2021 10:36) (75 - 130)  BP: 153/81 (01 Oct 2021 10:36) (131/75 - 163/78)  BP(mean): 105 (01 Oct 2021 10:36) (105 - 105)  RR: 18 (01 Oct 2021 10:36) (17 - 18)  SpO2: 99% (01 Oct 2021 10:36) (88% - 99%)  Supplemental O2: on RA     PHYSICAL EXAM    General: 82 y/o white female awake, alert, sitting upright in bed now, pleasant and cooperative, but very confused to time, past and recent events/ hx,  although in NAD  HEENT:  conj pink, sclerae anicteric, PERRLA, no oral lesions noted, mucosa slightly dry  Neck: supple, no nodes noted  Heart: RR  Lungs: decreased BS bilaterally at the bases with few rhonchi noted in the mid-lung fields  Abdomen: BS+, semi-soft, nontender to palpation, well-healed RUQ scar, well-healed lower abdominal scar, no masses or HS-megaly detected  Back: no CVA or Spinal tenderness elicited to palpation  Extremities: 1+ edema both LE's with mild warmth and erythema from above the ankle to about 1/3 up the calf with some dry scaling skin noted - tenderness to palpation in the Lt. calf                     well-healed scar of the Lt. Elbow  Skin: warm, dry, no rash        I&O's Summary :    30 Sep 2021 07:01  -  01 Oct 2021 07:00  --------------------------------------------------------  IN: 50 mL / OUT: 1000 mL / NET: -950 mL        Impression:  82 y/o white female with hx of HTN, Lt. Breast Ca, s/p Lt. Mastectomy with Lymph Node Dissection,  s/p Cholecystectomy, s/p Fx/ Dislocation of the Lt Elbow requiring Lt. Elbow ORIF in  at [facility], recent Cellulitis of the LE's requiring ab rx about 1 month ago and subsequent rx with a diuretic for continued swelling of the LE's with ultimate improvement,  reported Short-term memory loss, chronic knee pain, admitted via the ER on 21 with c/o difficulty ambulating and w/u revealed a fever to 100.3, leukocytosis, and Multifocal Bilateral Lower Lobe Consolidations on CXR and CT of the Chest along with an enlarged/ multi-nodular Thyroid gland and a Sclerotic area in the Rt. Glenoid.  Patient was begun on Rocephin + Zithromax po and now with 1 blood Cx out of 2 growing Gm Positive Cocci in Clusters with preliminary identification of CNS by PCR.  Sepsis presumably due to Multifocal Bilateral lower Lobe PNA and ? CNS  Bacteremia may represent a contaminant, however patient has underlying hardware in the Lt Elbow along with apparent ongoing/ recurrent Cellulitis of the LE's.    Suggestions:  Will therefore continue current ab rx with Rocephin + Zithromax and add Vanco pending further Blood Cx results.  Will request Urine for Legionella Ag and Venous Doppler of the LE's to further evaluate.  Follow-up temps and labs closely.  Discussed with patient at the bedside.  When patient is clinically improved would recommend COVID-19 Vaccination if she is amenable to this.  Thanks, will follow with you.

## 2021-10-01 NOTE — PROGRESS NOTE ADULT - SUBJECTIVE AND OBJECTIVE BOX
INTERVAL HPI/OVERNIGHT EVENTS:        REVIEW OF SYSTEMS:  CONSTITUTIONAL:  no  complaints    NECK: No pain or stiffnes  RESPIRATORY: No SOB   CARDIOVASCULAR: No chest pain, palpitations, dizziness,   GASTROINTESTINAL: No abdominal pain. No nausea, vomiting,   NEUROLOGICAL: No headaches, no  blurry  vision no  dizziness  SKIN: No itching,   MUSCULOSKELETAL: No pain    MEDICATION:  azithromycin   Tablet 500 milliGRAM(s) Oral daily  cefTRIAXone   IVPB 1000 milliGRAM(s) IV Intermittent every 24 hours  enoxaparin Injectable 40 milliGRAM(s) SubCutaneous daily  metoprolol tartrate 25 milliGRAM(s) Oral two times a day    Vital Signs Last 24 Hrs  T(C): 36.9 (01 Oct 2021 10:36), Max: 37.3 (01 Oct 2021 00:11)  T(F): 98.5 (01 Oct 2021 10:36), Max: 99.1 (01 Oct 2021 00:11)  HR: 89 (01 Oct 2021 10:36) (75 - 130)  BP: 153/81 (01 Oct 2021 10:36) (131/75 - 163/78)  BP(mean): 105 (01 Oct 2021 10:36) (105 - 105)  RR: 18 (01 Oct 2021 10:36) (17 - 18)  SpO2: 99% (01 Oct 2021 10:36) (88% - 99%)    PHYSICAL EXAM:  GENERAL: NAD, well-groomed, well-developed  HEENT : Conjuntivae  clear sclerae anicteric  NECK: Supple, No JVD, Normal thyroid  NERVOUS SYSTEM:  Alert oriented   no  focal  deficits;   CHEST/LUNG:    reonchis  rt  base  HEART: Regular rate and rhythm; No murmurs, rubs, or gallops  ABDOMEN: Soft, Nontender, Nondistended; Bowel sounds present  EXTREMITIES:  no  edema no  tenderness  SKIN: No rashes   LABS:                        14.7   11.38 )-----------( 196      ( 01 Oct 2021 07:37 )             46.2     10-    139  |  106  |  17  ----------------------------<  107<H>  4.1   |  29  |  0.41<L>    Ca    8.9      01 Oct 2021 07:37    TPro  6.8  /  Alb  2.4<L>  /  TBili  0.6  /  DBili  x   /  AST  19  /  ALT  24  /  AlkPhos  57  10-      Urinalysis Basic - ( 29 Sep 2021 22:24 )    Color: Yellow / Appearance: Clear / S.020 / pH: x  Gluc: x / Ketone: Large  / Bili: Negative / Urobili: 4 mg/dL   Blood: x / Protein: 30 mg/dL / Nitrite: Negative   Leuk Esterase: Negative / RBC: 3-5 /HPF / WBC 6-10   Sq Epi: x / Non Sq Epi: Moderate / Bacteria: Few      CAPILLARY BLOOD GLUCOSE          RADIOLOGY & ADDITIONAL TESTS:    Imaging reports  Personally Reviewed:  [ ] YES  [ ] NO    Consultant(s) Notes Reviewed:  [x ] YES  [ ] NO    Care Discussed with Consultants/Other Providers [ x] YES  [ ] NO  Problem/Plan - 1:  ·  Problem: Ambulatory dysfunction.   ·  Plan: -Patient p/w knee pain and inability to ambulate   -Topical NSAID   -PT consult.    Problem/Plan - 2:  ·  Problem: Pneumonia.   ·  Plan: -observe  with    -  Problem/Plan - 3:  ·  Problem: Chronic venous stasis dermatitis.   ·  Plan: -Patient noted to have chronic venous stasis  changes, no s/s of infection   -Keep legs elevated.    Problem/Plan - 4:  ·  Problem: Thyroid nodule.   endocrinology  evaluation      Problem/Plan - 5:  ·  Problem: HTN (hypertension).   ·  Plan: -Patient has hx of HTN metoprolol.    Problem/Plan - 6:  ·  Problem: Preventive measure.   ·  Plan: Lovenox S/C.

## 2021-10-02 LAB
ANION GAP SERPL CALC-SCNC: 6 MMOL/L — SIGNIFICANT CHANGE UP (ref 5–17)
BUN SERPL-MCNC: 13 MG/DL — SIGNIFICANT CHANGE UP (ref 7–23)
CALCIUM SERPL-MCNC: 9 MG/DL — SIGNIFICANT CHANGE UP (ref 8.5–10.1)
CHLORIDE SERPL-SCNC: 104 MMOL/L — SIGNIFICANT CHANGE UP (ref 96–108)
CO2 SERPL-SCNC: 28 MMOL/L — SIGNIFICANT CHANGE UP (ref 22–31)
CREAT SERPL-MCNC: 0.29 MG/DL — LOW (ref 0.5–1.3)
CRP SERPL-MCNC: 84 MG/L — HIGH
ERYTHROCYTE [SEDIMENTATION RATE] IN BLOOD: 62 MM/HR — HIGH (ref 0–20)
GLUCOSE SERPL-MCNC: 104 MG/DL — HIGH (ref 70–99)
HCT VFR BLD CALC: 44.9 % — SIGNIFICANT CHANGE UP (ref 34.5–45)
HGB BLD-MCNC: 14.2 G/DL — SIGNIFICANT CHANGE UP (ref 11.5–15.5)
LEGIONELLA AG UR QL: NEGATIVE — SIGNIFICANT CHANGE UP
MCHC RBC-ENTMCNC: 29.8 PG — SIGNIFICANT CHANGE UP (ref 27–34)
MCHC RBC-ENTMCNC: 31.6 GM/DL — LOW (ref 32–36)
MCV RBC AUTO: 94.1 FL — SIGNIFICANT CHANGE UP (ref 80–100)
NRBC # BLD: 0 /100 WBCS — SIGNIFICANT CHANGE UP (ref 0–0)
PLATELET # BLD AUTO: 219 K/UL — SIGNIFICANT CHANGE UP (ref 150–400)
POTASSIUM SERPL-MCNC: 3.7 MMOL/L — SIGNIFICANT CHANGE UP (ref 3.5–5.3)
POTASSIUM SERPL-SCNC: 3.7 MMOL/L — SIGNIFICANT CHANGE UP (ref 3.5–5.3)
RBC # BLD: 4.77 M/UL — SIGNIFICANT CHANGE UP (ref 3.8–5.2)
RBC # FLD: 13.4 % — SIGNIFICANT CHANGE UP (ref 10.3–14.5)
SODIUM SERPL-SCNC: 138 MMOL/L — SIGNIFICANT CHANGE UP (ref 135–145)
T3 SERPL-MCNC: 103 NG/DL — SIGNIFICANT CHANGE UP (ref 80–200)
T4 AB SER-ACNC: 7.4 UG/DL — SIGNIFICANT CHANGE UP (ref 4.6–12)
TSH SERPL-MCNC: 0.66 UIU/ML — SIGNIFICANT CHANGE UP (ref 0.36–3.74)
WBC # BLD: 11.37 K/UL — HIGH (ref 3.8–10.5)
WBC # FLD AUTO: 11.37 K/UL — HIGH (ref 3.8–10.5)

## 2021-10-02 RX ORDER — MINERAL OIL
1 OIL (ML) MISCELLANEOUS
Refills: 0 | Status: DISCONTINUED | OUTPATIENT
Start: 2021-10-02 | End: 2021-10-08

## 2021-10-02 RX ADMIN — Medication 25 MILLIGRAM(S): at 05:43

## 2021-10-02 RX ADMIN — AZITHROMYCIN 500 MILLIGRAM(S): 500 TABLET, FILM COATED ORAL at 12:35

## 2021-10-02 RX ADMIN — Medication 25 MILLIGRAM(S): at 17:34

## 2021-10-02 RX ADMIN — ENOXAPARIN SODIUM 40 MILLIGRAM(S): 100 INJECTION SUBCUTANEOUS at 12:30

## 2021-10-02 RX ADMIN — Medication 250 MILLIGRAM(S): at 12:30

## 2021-10-02 RX ADMIN — CEFTRIAXONE 100 MILLIGRAM(S): 500 INJECTION, POWDER, FOR SOLUTION INTRAMUSCULAR; INTRAVENOUS at 22:08

## 2021-10-02 RX ADMIN — Medication 250 MILLIGRAM(S): at 20:05

## 2021-10-02 RX ADMIN — Medication 1 TABLET(S): at 17:34

## 2021-10-02 RX ADMIN — Medication 1 TABLET(S): at 05:43

## 2021-10-02 RX ADMIN — Medication 1 APPLICATION(S): at 17:34

## 2021-10-02 NOTE — PROGRESS NOTE ADULT - SUBJECTIVE AND OBJECTIVE BOX
INTERVAL HPI:   81 year old female HTN, Short term memory loss and Chronic knee pain .  Presented  for weakness and knee pain.  She  reports feeling well and says that she was  brought to the hospital by people living in her house. She is a poor historian   Reported to be  treated  for b/l LE  cellulitis about a month ago, then started taking OTC water pills TID for the last one week, her swelling did improve.   However, for the last two days pt is unable to stand or walk without assistance, at baseline pt is able to ambulate on her own, family felt that her potassium level may be low due to the water pills she was taking.   IN  ED , CT chest showed: Multifocal consolidations in the right and left lower lobes.  Says she is a smoker.    OVERNIGHT EVENTS:  Feels better.    Vital Signs Last 24 Hrs  T(C): 36.9 (02 Oct 2021 17:02), Max: 37.6 (01 Oct 2021 23:05)  T(F): 98.4 (02 Oct 2021 17:02), Max: 99.6 (01 Oct 2021 23:05)  HR: 79 (02 Oct 2021 17:02) (79 - 96)  BP: 147/70 (02 Oct 2021 17:02) (131/77 - 147/70)  BP(mean): --  RR: 18 (02 Oct 2021 17:43) (17 - 19)  SpO2: 92% (02 Oct 2021 17:43) (90% - 95%)    PHYSICAL EXAM:  GEN:         Awake, responsive and comfortable.  HEENT:    Normal.    RESP:        no wheezing.  CVS:          Regular rate and rhythm.     MEDICATIONS  (STANDING):  azithromycin   Tablet 500 milliGRAM(s) Oral daily  cefTRIAXone   IVPB 1000 milliGRAM(s) IV Intermittent every 24 hours  enoxaparin Injectable 40 milliGRAM(s) SubCutaneous daily  lactobacillus acidophilus 1 Tablet(s) Oral two times a day  metoprolol tartrate 25 milliGRAM(s) Oral two times a day  mineral oil for Topical Use 1 Application(s) Topical two times a day  vancomycin  IVPB      vancomycin  IVPB 1000 milliGRAM(s) IV Intermittent every 12 hours    LABS:                        14.2   11.37 )-----------( 219      ( 02 Oct 2021 08:36 )             44.9     10-02    138  |  104  |  13  ----------------------------<  104<H>  3.7   |  28  |  0.29<L>    Ca    9.0      02 Oct 2021 08:36    TPro  6.8  /  Alb  2.4<L>  /  TBili  0.6  /  DBili  x   /  AST  19  /  ALT  24  /  AlkPhos  57  10-01    ASSESSMENT AND PLAN:  ·	Multifocal pneumonia.  ·	Hypokalemia.  ·	HTN.  ·	Obesity.    Continue O2 and antibiotics.  Seen by ID.

## 2021-10-02 NOTE — PROGRESS NOTE ADULT - SUBJECTIVE AND OBJECTIVE BOX
TMAX - 99.6    On day # 3 Rocephin / # 3 po Zithromax / # 1 Vanco    Vital Signs Last 24 Hrs  T(C): 37.1 (02 Oct 2021 13:24), Max: 37.6 (01 Oct 2021 23:05)  T(F): 98.7 (02 Oct 2021 13:24), Max: 99.6 (01 Oct 2021 23:05)  HR: 96 (02 Oct 2021 13:24) (83 - 96)  BP: 131/77 (02 Oct 2021 13:24) (131/77 - 156/88)  BP(mean): --  RR: 18 (02 Oct 2021 13:24) (17 - 19)  SpO2: 92% (02 Oct 2021 13:24) (90% - 95%)  Supplemental O2:  on RA     Awake, alert, no c/p cp or SOB but still with intermittent productive cough but she admits to continuing to swallow the mucous.  No N/V/D and no c/o urinary difficulties.  O2 Sat's noted to be between 90 -99% on RA ( RN reported that the decrease to  90% was when she ambulated with PT ).  Remains confused but acknowledged that "this place " looked like a Hospital.      PHYSICAL EXAM  General: 82 y/o white female awake, alert, sitting upright in bed now and on RA,  pleasant and cooperative, but very confused to time, past and recent events/ hx,  although in NAD  HEENT:  conj pink, sclerae anicteric, PERRLA, no oral lesions noted, mucosa slightly dry  Neck: supple, no nodes noted  Heart: RR  Lungs: decreased BS bilaterally at the bases with few rhonchi noted in the mid-lung fields  Abdomen: BS+, semi-soft, nontender to palpation, well-healed RUQ scar, well-healed lower abdominal scar, no masses or HS-megaly detected  Back: no CVA or Spinal tenderness elicited to palpation  Extremities: 1+ edema both LE's with decreased warmth and fading darker  erythema from above the ankle to about 1/3 up the calf with some dry scaling skin noted - nontender to palpation now                    well-healed scar of the Lt. Elbow - no  surrounding erythema or swelling noted  Skin: warm, dry, no rash      I&O's Summary :    01 Oct 2021 07:01  -  02 Oct 2021 07:00  --------------------------------------------------------  IN: 300 mL / OUT: 400 mL / NET: -100 mL    02 Oct 2021 07:01  -  02 Oct 2021 16:03  --------------------------------------------------------  IN: 0 mL / OUT: 400 mL / NET: -400 mL      LABS:  CBC Full  -  ( 02 Oct 2021 08:36 )  WBC Count : 11.37 K/uL  RBC Count : 4.77 M/uL  Hemoglobin : 14.2 g/dL  Hematocrit : 44.9 %  Platelet Count - Automated : 219 K/uL  Mean Cell Volume : 94.1 fl  Mean Cell Hemoglobin : 29.8 pg  Mean Cell Hemoglobin Concentration : 31.6 gm/dL  Auto Neutrophil # : x  Auto Lymphocyte # : x  Auto Monocyte # : x  Auto Eosinophil # : x  Auto Basophil # : x  Auto Neutrophil % : x  Auto Lymphocyte % : x  Auto Monocyte % : x  Auto Eosinophil % : x  Auto Basophil % : x    10-02    138  |  104  |  13  ----------------------------<  104<H>  3.7   |  28  |  0.29<L>    Ca    9.0      02 Oct 2021 08:36    TPro  6.8  /  Alb  2.4<L>  /  TBili  0.6  /  DBili  x   /  AST  19  /  ALT  24  /  AlkPhos  57  10-01    LIVER FUNCTIONS - ( 01 Oct 2021 07:37 )  Alb: 2.4 g/dL / Pro: 6.8 gm/dL / ALK PHOS: 57 U/L / ALT: 24 U/L / AST: 19 U/L / GGT: x           Sedimentation Rate, Erythrocyte: 62 mm/hr (10-02 @ 08:36)  Sedimentation Rate, Erythrocyte: 34 mm/hr (09-30 @ 08:09)    C-Reactive Protein, Serum (10.02.21 @ 11:51)    C-Reactive Protein, Serum: 84 mg/L    C-Reactive Protein, Serum (09.30.21 @ 12:58)    C-Reactive Protein, Serum: 90 mg/L    Procalcitonin, Serum (10.01.21 @ 11:05)    Procalcitonin, Serum: 0.06:     Procalcitonin, Serum (09.30.21 @ 15:28)    Procalcitonin, Serum: 0.11:     Lactate, Blood (09.29.21 @ 22:24)    Lactate, Blood: 1.4 mmol/L    Mycoplasma Pneumoniae IgM Antibody (09.30.21 @ 12:41)    Mycoplasma IgM AB: 0.38 Index    Mycoplasma: Negative: Method USAMA           Interpretation:                                             Index                  Negative              <=0.90                  Equivocal            0.91-1.09                  Positive                >=1.10      Thyroid Stimulating Hormone, Serum (10.02.21 @ 08:36)    Thyroid Stimulating Hormone, Serum: 0.655 uIU/mL      Thyroid Stimulating Hormone, Serum (09.30.21 @ 08:09)    Thyroid Stimulating Hormone, Serum: 0.639 uIU/mL        MICROBIOLOGY:  Specimen Source: .Blood Blood (09-30 @ 09:04)  Culture Results:   Growth in aerobic bottle: Staphylococcus simulans  Coag Negative Staphylococcus  Single set isolate, possible contaminant. Contact  Microbiology if susceptibility testing clinically  indicated.    Specimen Source: .Blood Blood (09-30 @ 09:04)  Culture Results:   No growth to date. (09-30 @ 09:04)    Specimen Source: Clean Catch Clean Catch (Midstream) (09-30 @ 08:57)  Culture Results:   <10,000 CFU/mL Normal Urogenital Nikki (09-30 @ 08:57)     COVID-19 Shahab Domain Antibody (10.01.21 @ 10:46)    COVID-19 Shahab Domain Antibody Result: 0.40: Roche ECLIA Total AB (KIRSTIN)  NOTE: This result index represents a total antibody measurement, which  includes IgG, IgA and IgM.  Measures Receptor Binding Domain of the Shahab Protein  Negative <= 0.79 U/mL  Positive  >= 0.80 U/mL U/mL    COVID-19 Shahab Domain AB Interp: Negative: This test has been authorized for emergency use by the FDA     Flu With COVID-19 By VONDA (09.29.21 @ 16:52)    SARS-CoV-2 Result: NotDetec: EUA/IVD  This Respiratory Panel uses polymerase chain reaction (PCR) to detect for  influenza A; influenza B; and SARS-CoV-2.    Influenza A Result: NotDetec: EUA/IVD    Influenza B Result: NotDetec: EUA/IVD      Radiology:           < from: US Duplex Venous Lower Ext Complete, Bilateral (10.01.21 @ 19:29) >  EXAM:  US DPLX LWR EXT VEINS COMPL BI                        PROCEDURE DATE:  10/01/2021    INTERPRETATION:  CLINICAL INFORMATION: 81 years  Female with r/o DVT. Bilateral lower extremity swelling, tenderness and erythema.  COMPARISON: None available.  TECHNIQUE: Duplex sonography of the BILATERAL LOWER extremity veins with color and spectral Doppler, with and without compression.  FINDINGS:  RIGHT:  Normal compressibility of the RIGHT common femoral, femoral and popliteal veins.  Doppler examination shows normal spontaneous and phasic flow.  No RIGHT calf vein thrombosis is detected.    LEFT:  Normal compressibility of the LEFT common femoral, femoral and popliteal veins.  Doppler examination shows normal spontaneous and phasic flow.  No LEFT calf vein thrombosis is detected.    IMPRESSION:  No evidence of deep venous thrombosis in either lower extremity.                 < from: US Thyroid (09.30.21 @ 08:42) >  EXAM:  US THYROID ONLY                        PROCEDURE DATE:  09/30/2021    INTERPRETATION:  Indication: Abnormal thyroid on CT of 9/29/2021.  Bilateral thyroid ultrasound.  Right thyroid lobe 6.2 x 3.4 x 3.4 cm. Left thyroid lobe 6.1 x 3.9 x 3.9 cm. Isthmus measures 0.54 cm AP. Multiple bilateral solid nodules, some with calcifications. The dominant nodule in the right lower pole measures 1.6 cm.    IMPRESSION: Enlarged multinodular thyroid as discussed. Consider biopsy of the dominant nodule for tissue specific diagnosis.            < from: CT Chest No Cont (09.29.21 @ 20:48) >  EXAM:  CT CHEST                        PROCEDURE DATE:  09/29/2021    INTERPRETATION:  CLINICAL INFORMATION: weakness, evaluate for pneumonia.  COMPARISON: Comparison to numerous prior examinations most recent on 9/29/2021  CONTRAST/COMPLICATIONS:  IV Contrast: None  Oral Contrast: None  Complications: None  PROCEDURE:  CT of the Chest was performed.  Sagittal and coronal reformats were performed.  FINDINGS:    LUNGS AND AIRWAYS: Patent central airways.  Multifocal consolidations in the right lower and left lower lobes. Subsegmental atelectasis versus scarring bilateral upper lobes and bilateral lower lobes.  PLEURA: No pleural effusion.  MEDIASTINUM AND ABNER: No lymphadenopathy.  VESSELS: Atherosclerotic calcifications ofthe aorta and coronary arteries.  HEART: Heart size is normal. No pericardial effusion.  CHEST WALL AND LOWER NECK: Status post left mastectomy and left axillary dissection. Enlarged left lobe of the thyroid with multiple hypoattenuating nodules and small calcifications.  VISUALIZED UPPER ABDOMEN: Moderate size hiatal hernia. Simple cyst in the left kidney measuring 2.4 cm.  BONES: Degenerative changes. Small sclerotic focus at the right glenoid (7, 6).    IMPRESSION:    Multifocal consolidations in the right and left lower lobes, correlate clinically for pneumonia.    Enlarged left lobe of the thyroid gland with multiple hypoattenuating nodules and small calcifications, recommend nonemergent thyroid ultrasound.    Small sclerotic focus in the right glenoid, recommend bone scan for further evaluation.                   < from: Xray Knee 3 Views, Bilateral (09.29.21 @ 16:41) >  EXAM:  XR KNEE 3 VIEWS BI                        PROCEDURE DATE:  09/29/2021    INTERPRETATION:  Bilateral knees  HISTORY:Difficult ambulation for two days   Three views of the right knee and three views of the left knee show no evidence of acute fracture nor destructive change. The joint spaces show mild arthritic changes of the medial compartments with calcified menisci bilaterally. Vascular calcifications are present. Small nodule of the lower right thigh may be in the skin asit is not visualized in the right lateral image.    IMPRESSION: No acute bony pathology.    Note - This does not exclude fractures in perfect alignment and apposition as presence may be indicated at one to three weeks following callus formation.    < from: Xray Chest 1 View- PORTABLE-Urgent (Xray Chest 1 View- PORTABLE-Urgent .) (09.29.21 @ 16:40) >  EXAM:  XR CHEST PORTABLE URGENT 1V                        PROCEDURE DATE:  09/29/2021    INTERPRETATION:  Chest one view  HISTORY: Weakness  COMPARISON STUDY: 5/5/2019  Frontal expiratory view of the chest shows the heart to be similar in size. The lungs show bilateral patchy infiltrates, right more than left and there is no evidence of pneumothorax nor pleural effusion.    IMPRESSION:  Patchy infiltrates.  Further information may be obtained from the patient's subsequent CT of the chest.            Impression:    82 y/o white female with hx of HTN, Lt. Breast Ca, s/p Lt. Mastectomy with Lymph Node Dissection,  s/p Cholecystectomy, s/p Fx/ Dislocation of the Lt Elbow requiring Lt. Elbow [facility] in 5/19 [facility], recent Cellulitis of the LE's requiring ab rx about 1 month ago and subsequent rx with a diuretic for continued swelling of the LE's with ultimate improvement,  reported Short-term memory loss, chronic knee pain, admitted via the ER on 9/29/21 with c/o difficulty ambulating and w/u revealed a fever to 100.3, leukocytosis, and Multifocal Bilateral Lower Lobe Consolidations on CXR and CT of the Chest along with an enlarged/ multi-nodular Thyroid gland and a Sclerotic area in the Rt. Glenoid.  Patient was begun on Rocephin + Zithromax po and now with 1 blood Cx out of 2 growing Gm Positive Cocci in Clusters confirmed as CNS now.  Sepsis presumably due to Multifocal Bilateral lower Lobe PNA and ? CNS  Bacteremia may represent a contaminant, however patient has underlying hardware in the Lt Elbow along with apparent ongoing/ recurrent Cellulitis of the LE's superimposed on underlying venous stasis changes.  Noted that venous Doppler of the LE's is Negative for DVT now and patient without any tenderness and with some decreasing erythema of the LE's today.  Noted TSH is WNL.  Noted COVID-19 Ab is negative ( consistent with hx that patient has NOT been vaccinated as yet ).    Suggestions:  Will therefore continue current ab rx with Rocephin + Zithromax + Vanco pending further Blood Cx results. If further Blood Cx results remain negative, will d/c Vanco.  Await results of  Urine for Legionella  Ag and Chlamydia pneumoniae ab's.  Follow-up temps. labs. and O2 Sat's.  Repeat CXR in a few days.  Await Endocrine evaluation re: enlarged/Multinodular Thyroid.  Discussed with patient at the bedside.  When patient is clinically improved would recommend COVID-19 Vaccination if she is amenable to this.

## 2021-10-02 NOTE — PROGRESS NOTE ADULT - SUBJECTIVE AND OBJECTIVE BOX
INTERVAL HPI/OVERNIGHT EVENTS:        REVIEW OF SYSTEMS:  CONSTITUTIONAL:  no  complaints    NECK: No pain or stiffnes  RESPIRATORY: No SOB   CARDIOVASCULAR: No chest pain, palpitations, dizziness,   GASTROINTESTINAL: No abdominal pain. No nausea, vomiting,   NEUROLOGICAL: No headaches, no  blurry  vision no  dizziness  SKIN: No itching,   MUSCULOSKELETAL: No pain    MEDICATION:  azithromycin   Tablet 500 milliGRAM(s) Oral daily  cefTRIAXone   IVPB 1000 milliGRAM(s) IV Intermittent every 24 hours  enoxaparin Injectable 40 milliGRAM(s) SubCutaneous daily  lactobacillus acidophilus 1 Tablet(s) Oral two times a day  metoprolol tartrate 25 milliGRAM(s) Oral two times a day  vancomycin  IVPB      vancomycin  IVPB 1000 milliGRAM(s) IV Intermittent every 12 hours    Vital Signs Last 24 Hrs  T(C): 37.1 (02 Oct 2021 05:02), Max: 37.6 (01 Oct 2021 23:05)  T(F): 98.7 (02 Oct 2021 05:02), Max: 99.6 (01 Oct 2021 23:05)  HR: 83 (02 Oct 2021 05:02) (83 - 93)  BP: 136/80 (02 Oct 2021 05:02) (136/80 - 156/88)  BP(mean): 105 (01 Oct 2021 10:36) (105 - 105)  RR: 18 (02 Oct 2021 05:02) (17 - 18)  SpO2: 95% (02 Oct 2021 05:02) (93% - 99%)    PHYSICAL EXAM:  GENERAL: NAD, well-groomed, well-developed  HEENT : Conjuntivae  clear sclerae anicteric  NECK: Supple, No JVD, Normal thyroid  NERVOUS SYSTEM:  Alert oriented   no  focal  deficits;   CHEST/LUNG:   ronchis  rt  base   HEART: Regular rate and rhythm; No murmurs, rubs, or gallops  ABDOMEN: Soft, Nontender, Nondistended; Bowel sounds present  EXTREMITIES:  mild    edema mild  diffuse   tenderness  SKIN: No rashes   LABS:                        14.7   11.38 )-----------( 196      ( 01 Oct 2021 07:37 )             46.2     10-01    139  |  106  |  17  ----------------------------<  107<H>  4.1   |  29  |  0.41<L>    Ca    8.9      01 Oct 2021 07:37    TPro  6.8  /  Alb  2.4<L>  /  TBili  0.6  /  DBili  x   /  AST  19  /  ALT  24  /  AlkPhos  57  10-01        CAPILLARY BLOOD GLUCOSE          RADIOLOGY & ADDITIONAL TESTS:    Imaging reports  Personally Reviewed:  [ ] YES  [ ] NO    Consultant(s) Notes Reviewed:  [ x] YES  [ ] NO    Care Discussed with Consultants/Other Providers [x ] YES  [ ] NO  Problem/Plan - 1:  ·  Problem: Ambulatory dysfunction.   ·  Plan: -Patient p/w knee pain and inability to ambulate   -Topical NSAID   -PT consult.    Problem/Plan - 2:  ·  Problem: Pneumonia.   ·  Plan: -observe  with  ceftriaxone  vanco  -  Problem/Plan - 3:  ·  Problem: Chronic venous stasis dermatitis.   ·  Plan: -Patient noted to have chronic venous stasis  changes, no s/s of infection   -Keep legs elevated.    Problem/Plan - 4:  ·  Problem: Thyroid nodule.   endocrinology  evaluation      Problem/Plan - 5:  ·  Problem: HTN (hypertension).   ·  Plan: -Patient has hx of HTN metoprolol.    Problem/Plan - 6:  ·  Problem: Preventive measure.   ·  Plan: Lovenox S/C.

## 2021-10-03 LAB
ANION GAP SERPL CALC-SCNC: 4 MMOL/L — LOW (ref 5–17)
BUN SERPL-MCNC: 13 MG/DL — SIGNIFICANT CHANGE UP (ref 7–23)
CALCIUM SERPL-MCNC: 9.1 MG/DL — SIGNIFICANT CHANGE UP (ref 8.5–10.1)
CHLORIDE SERPL-SCNC: 103 MMOL/L — SIGNIFICANT CHANGE UP (ref 96–108)
CO2 SERPL-SCNC: 32 MMOL/L — HIGH (ref 22–31)
CREAT SERPL-MCNC: 0.37 MG/DL — LOW (ref 0.5–1.3)
GLUCOSE SERPL-MCNC: 122 MG/DL — HIGH (ref 70–99)
HCT VFR BLD CALC: 44.6 % — SIGNIFICANT CHANGE UP (ref 34.5–45)
HGB BLD-MCNC: 14.1 G/DL — SIGNIFICANT CHANGE UP (ref 11.5–15.5)
MCHC RBC-ENTMCNC: 29.7 PG — SIGNIFICANT CHANGE UP (ref 27–34)
MCHC RBC-ENTMCNC: 31.6 GM/DL — LOW (ref 32–36)
MCV RBC AUTO: 93.9 FL — SIGNIFICANT CHANGE UP (ref 80–100)
NRBC # BLD: 0 /100 WBCS — SIGNIFICANT CHANGE UP (ref 0–0)
PLATELET # BLD AUTO: 222 K/UL — SIGNIFICANT CHANGE UP (ref 150–400)
POTASSIUM SERPL-MCNC: 4 MMOL/L — SIGNIFICANT CHANGE UP (ref 3.5–5.3)
POTASSIUM SERPL-SCNC: 4 MMOL/L — SIGNIFICANT CHANGE UP (ref 3.5–5.3)
RBC # BLD: 4.75 M/UL — SIGNIFICANT CHANGE UP (ref 3.8–5.2)
RBC # FLD: 13.2 % — SIGNIFICANT CHANGE UP (ref 10.3–14.5)
SODIUM SERPL-SCNC: 139 MMOL/L — SIGNIFICANT CHANGE UP (ref 135–145)
VANCOMYCIN TROUGH SERPL-MCNC: 9.3 UG/ML — LOW (ref 10–20)
WBC # BLD: 9.4 K/UL — SIGNIFICANT CHANGE UP (ref 3.8–10.5)
WBC # FLD AUTO: 9.4 K/UL — SIGNIFICANT CHANGE UP (ref 3.8–10.5)

## 2021-10-03 RX ADMIN — AZITHROMYCIN 500 MILLIGRAM(S): 500 TABLET, FILM COATED ORAL at 12:50

## 2021-10-03 RX ADMIN — Medication 250 MILLIGRAM(S): at 11:19

## 2021-10-03 RX ADMIN — Medication 1 APPLICATION(S): at 17:43

## 2021-10-03 RX ADMIN — Medication 1 TABLET(S): at 05:17

## 2021-10-03 RX ADMIN — Medication 250 MILLIGRAM(S): at 21:52

## 2021-10-03 RX ADMIN — Medication 1 TABLET(S): at 17:43

## 2021-10-03 RX ADMIN — Medication 25 MILLIGRAM(S): at 17:43

## 2021-10-03 RX ADMIN — ENOXAPARIN SODIUM 40 MILLIGRAM(S): 100 INJECTION SUBCUTANEOUS at 12:50

## 2021-10-03 RX ADMIN — Medication 1 APPLICATION(S): at 05:17

## 2021-10-03 RX ADMIN — Medication 25 MILLIGRAM(S): at 05:17

## 2021-10-03 RX ADMIN — CEFTRIAXONE 100 MILLIGRAM(S): 500 INJECTION, POWDER, FOR SOLUTION INTRAMUSCULAR; INTRAVENOUS at 21:14

## 2021-10-04 DIAGNOSIS — E04.9 NONTOXIC GOITER, UNSPECIFIED: ICD-10-CM

## 2021-10-04 LAB
ALBUMIN SERPL ELPH-MCNC: 2.1 G/DL — LOW (ref 3.3–5)
ALP SERPL-CCNC: 53 U/L — SIGNIFICANT CHANGE UP (ref 40–120)
ALT FLD-CCNC: 38 U/L — SIGNIFICANT CHANGE UP (ref 12–78)
ANION GAP SERPL CALC-SCNC: 7 MMOL/L — SIGNIFICANT CHANGE UP (ref 5–17)
AST SERPL-CCNC: 31 U/L — SIGNIFICANT CHANGE UP (ref 15–37)
BASOPHILS # BLD AUTO: 0.07 K/UL — SIGNIFICANT CHANGE UP (ref 0–0.2)
BASOPHILS NFR BLD AUTO: 0.8 % — SIGNIFICANT CHANGE UP (ref 0–2)
BILIRUB SERPL-MCNC: 0.6 MG/DL — SIGNIFICANT CHANGE UP (ref 0.2–1.2)
BUN SERPL-MCNC: 13 MG/DL — SIGNIFICANT CHANGE UP (ref 7–23)
CALCIUM SERPL-MCNC: 9.1 MG/DL — SIGNIFICANT CHANGE UP (ref 8.5–10.1)
CHLORIDE SERPL-SCNC: 102 MMOL/L — SIGNIFICANT CHANGE UP (ref 96–108)
CO2 SERPL-SCNC: 29 MMOL/L — SIGNIFICANT CHANGE UP (ref 22–31)
CREAT SERPL-MCNC: 0.31 MG/DL — LOW (ref 0.5–1.3)
CRP SERPL-MCNC: 46 MG/L — HIGH
EOSINOPHIL # BLD AUTO: 0.35 K/UL — SIGNIFICANT CHANGE UP (ref 0–0.5)
EOSINOPHIL NFR BLD AUTO: 4.2 % — SIGNIFICANT CHANGE UP (ref 0–6)
GLUCOSE BLDC GLUCOMTR-MCNC: 160 MG/DL — HIGH (ref 70–99)
GLUCOSE BLDC GLUCOMTR-MCNC: 494 MG/DL — CRITICAL HIGH (ref 70–99)
GLUCOSE SERPL-MCNC: 110 MG/DL — HIGH (ref 70–99)
HCT VFR BLD CALC: 44.7 % — SIGNIFICANT CHANGE UP (ref 34.5–45)
HGB BLD-MCNC: 14 G/DL — SIGNIFICANT CHANGE UP (ref 11.5–15.5)
IMM GRANULOCYTES NFR BLD AUTO: 0.5 % — SIGNIFICANT CHANGE UP (ref 0–1.5)
LYMPHOCYTES # BLD AUTO: 1.18 K/UL — SIGNIFICANT CHANGE UP (ref 1–3.3)
LYMPHOCYTES # BLD AUTO: 14 % — SIGNIFICANT CHANGE UP (ref 13–44)
MCHC RBC-ENTMCNC: 29.9 PG — SIGNIFICANT CHANGE UP (ref 27–34)
MCHC RBC-ENTMCNC: 31.3 GM/DL — LOW (ref 32–36)
MCV RBC AUTO: 95.3 FL — SIGNIFICANT CHANGE UP (ref 80–100)
MONOCYTES # BLD AUTO: 0.78 K/UL — SIGNIFICANT CHANGE UP (ref 0–0.9)
MONOCYTES NFR BLD AUTO: 9.3 % — SIGNIFICANT CHANGE UP (ref 2–14)
NEUTROPHILS # BLD AUTO: 5.99 K/UL — SIGNIFICANT CHANGE UP (ref 1.8–7.4)
NEUTROPHILS NFR BLD AUTO: 71.2 % — SIGNIFICANT CHANGE UP (ref 43–77)
NRBC # BLD: 0 /100 WBCS — SIGNIFICANT CHANGE UP (ref 0–0)
PLATELET # BLD AUTO: 230 K/UL — SIGNIFICANT CHANGE UP (ref 150–400)
POTASSIUM SERPL-MCNC: 4.2 MMOL/L — SIGNIFICANT CHANGE UP (ref 3.5–5.3)
POTASSIUM SERPL-SCNC: 4.2 MMOL/L — SIGNIFICANT CHANGE UP (ref 3.5–5.3)
PROT SERPL-MCNC: 6.5 GM/DL — SIGNIFICANT CHANGE UP (ref 6–8.3)
RBC # BLD: 4.69 M/UL — SIGNIFICANT CHANGE UP (ref 3.8–5.2)
RBC # FLD: 13.3 % — SIGNIFICANT CHANGE UP (ref 10.3–14.5)
SODIUM SERPL-SCNC: 138 MMOL/L — SIGNIFICANT CHANGE UP (ref 135–145)
WBC # BLD: 8.41 K/UL — SIGNIFICANT CHANGE UP (ref 3.8–10.5)
WBC # FLD AUTO: 8.41 K/UL — SIGNIFICANT CHANGE UP (ref 3.8–10.5)

## 2021-10-04 RX ADMIN — CEFTRIAXONE 100 MILLIGRAM(S): 500 INJECTION, POWDER, FOR SOLUTION INTRAMUSCULAR; INTRAVENOUS at 21:52

## 2021-10-04 RX ADMIN — Medication 25 MILLIGRAM(S): at 17:35

## 2021-10-04 RX ADMIN — Medication 1 APPLICATION(S): at 05:27

## 2021-10-04 RX ADMIN — Medication 1 APPLICATION(S): at 18:44

## 2021-10-04 RX ADMIN — Medication 250 MILLIGRAM(S): at 08:05

## 2021-10-04 RX ADMIN — Medication 25 MILLIGRAM(S): at 05:27

## 2021-10-04 RX ADMIN — ENOXAPARIN SODIUM 40 MILLIGRAM(S): 100 INJECTION SUBCUTANEOUS at 12:25

## 2021-10-04 RX ADMIN — Medication 1 TABLET(S): at 17:35

## 2021-10-04 RX ADMIN — Medication 1 TABLET(S): at 05:26

## 2021-10-04 RX ADMIN — AZITHROMYCIN 500 MILLIGRAM(S): 500 TABLET, FILM COATED ORAL at 12:25

## 2021-10-04 NOTE — CONSULT NOTE ADULT - SUBJECTIVE AND OBJECTIVE BOX
Patient is a 81y old  Female who presents with a chief complaint of pneumonia (04 Oct 2021 08:18)      Reason For Consult:  goiter    HPI:  81 year old female with h/o HTN, short term memory loss and chronic knee pain presents for weakness and knee pain. Patient is AAO x 2 to name and place at present but is not sure why she was brought to the hospital. She  reports feeling well and says that she brought to the hospital by people living in her house. Unable to reach patient's family to obtain collateral hx, hx obtained from ED provider note.   As per the note, pt was treated  for b/l LE  cellulitis about a month ago, her swelling did persist and pt started taking OTC water pills TID for the last one week, her swelling did improve, however, for the last two days pt is unable to stand or walk without assistance, at baseline pt is able to ambulate on her own, family felt that her potassium level may be low due to the water pills she was taking. In Ed, her CT chest showed: Multifocal consolidations in the right and left lower lobes. She was given IV abx      (29 Sep 2021 23:11)  CT showed a large goiter. T3 103, T4 7.4, TSH 0.655. Difficult to assess any compressive features     PAST MEDICAL & SURGICAL HISTORY:  HTN (hypertension)    No significant past surgical history        FAMILY HISTORY:  No pertinent family history in first degree relatives          Social History:    MEDICATIONS  (STANDING):  azithromycin   Tablet 500 milliGRAM(s) Oral daily  cefTRIAXone   IVPB 1000 milliGRAM(s) IV Intermittent every 24 hours  enoxaparin Injectable 40 milliGRAM(s) SubCutaneous daily  lactobacillus acidophilus 1 Tablet(s) Oral two times a day  metoprolol tartrate 25 milliGRAM(s) Oral two times a day  mineral oil for Topical Use 1 Application(s) Topical two times a day  vancomycin  IVPB      vancomycin  IVPB 1000 milliGRAM(s) IV Intermittent every 12 hours    MEDICATIONS  (PRN):      REVIEW OF SYSTEMS:  CONSTITUTIONAL:  as per HPI  HEENT:  Eyes:  No diplopia or blurred vision.   ENT:  No earache, sore throat or runny nose.  CARDIOVASCULAR:  No chest pain .  RESPIRATORY:  No cough, shortness of breath, PND or orthopnea.  MUSCULOSKELETAL:  no joint aches, no muscle pain, myalgia  SKIN:  No change in skin, hair or nails.  NEUROLOGIC:  No paresthesias, fasciculations, seizures or weakness.  PSYCHIATRIC:  No disorder of thought or mood.  ENDOCRINE:  No heat or cold intolerance, polyuria or polydipsia. abnormal weight gain or loss, oral thrush  HEMATOLOGICAL:  No easy bruising or bleeding.     T(C): 37.4 (10-04-21 @ 05:30), Max: 37.4 (10-04-21 @ 05:30)  HR: 69 (10-04-21 @ 08:09) (69 - 107)  BP: 146/74 (10-04-21 @ 05:30) (127/91 - 146/74)  RR: 17 (10-04-21 @ 08:09) (17 - 18)  SpO2: 97% (10-04-21 @ 08:09) (93% - 99%)  Wt(kg): --    PHYSICAL EXAM: difficult to arouse   GENERAL: NAD, well-groomed, well-developed  HEAD:  Atraumatic, Normocephalic  EYES: PERRLA, conjunctiva and sclera clear  ENMT: No  exudates,, Moist mucous membranes,, No lesions  NECK: Supple, No JVD, difficult neck exam , thyroid area full   NERVOUS SYSTEM:  Alert & Oriented   CHEST/LUNG: Clear to percussion bilaterally; No rales, rhonchi, wheezing, or rubs  HEART: Regular rate and rhythm; No murmurs, rubs, or gallops  ABDOMEN: Soft, Nontender, Nondistended; Bowel sounds present  EXTREMITIES:  2+ Peripheral Pulses, No clubbing, cyanosis, or edema  LYMPH: No lymphadenopathy noted  SKIN: No rashes or lesions    CAPILLARY BLOOD GLUCOSE      POCT Blood Glucose.: 160 mg/dL (04 Oct 2021 11:27)  POCT Blood Glucose.: 494 mg/dL (04 Oct 2021 11:23)                            14.0   8.41  )-----------( 230      ( 04 Oct 2021 07:31 )             44.7       CMP:  10-04 @ 07:31  SGPT 38  Albumin 2.1   Alk Phos 53   Anion Gap 7   SGOT 31   Total Bili 0.6   BUN 13   Calcium Total 9.1   CO2 29   Chloride 102   Creatinine 0.31   eGFR if    eGFR if non    Glucose 110   Potassium 4.2   Protein 6.5   Sodium 138      Thyroid Function Tests:      Diabetes Tests:     Parathyroids:     Adrenals:       Radiology:

## 2021-10-04 NOTE — PROGRESS NOTE ADULT - SUBJECTIVE AND OBJECTIVE BOX
INTERVAL HPI/OVERNIGHT EVENTS:        REVIEW OF SYSTEMS:  CONSTITUTIONAL:  no  complaints    NECK: No pain or stiffnes  RESPIRATORY: No SOB   CARDIOVASCULAR: No chest pain, palpitations, dizziness,   GASTROINTESTINAL: No abdominal pain. No nausea, vomiting,   NEUROLOGICAL: No headaches, no  blurry  vision no  dizziness  SKIN: No itching,   MUSCULOSKELETAL: No pain    MEDICATION:  azithromycin   Tablet 500 milliGRAM(s) Oral daily  cefTRIAXone   IVPB 1000 milliGRAM(s) IV Intermittent every 24 hours  enoxaparin Injectable 40 milliGRAM(s) SubCutaneous daily  lactobacillus acidophilus 1 Tablet(s) Oral two times a day  metoprolol tartrate 25 milliGRAM(s) Oral two times a day  mineral oil for Topical Use 1 Application(s) Topical two times a day  vancomycin  IVPB      vancomycin  IVPB 1000 milliGRAM(s) IV Intermittent every 12 hours    Vital Signs Last 24 Hrs  T(C): 37.4 (04 Oct 2021 05:30), Max: 37.4 (04 Oct 2021 05:30)  T(F): 99.4 (04 Oct 2021 05:30), Max: 99.4 (04 Oct 2021 05:30)  HR: 69 (04 Oct 2021 08:09) (69 - 107)  BP: 146/74 (04 Oct 2021 05:30) (127/91 - 146/74)  BP(mean): --  RR: 17 (04 Oct 2021 08:09) (17 - 19)  SpO2: 97% (04 Oct 2021 08:09) (93% - 99%)    PHYSICAL EXAM:  GENERAL: NAD, well-groomed, well-developed  HEENT : Conjuntivae  clear sclerae anicteric  NECK: Supple, No JVD, Normal thyroid  NERVOUS SYSTEM:  Alert oriented   no  focal  deficits;   CHEST/LUNG:    decreased  bs  HEART: Regular rate and rhythm; No murmurs, rubs, or gallops  ABDOMEN: Soft, Nontender, Nondistended; Bowel sounds present  EXTREMITIES:  no  edema no  tenderness  SKIN: No rashes   LABS:                        14.0   8.41  )-----------( 230      ( 04 Oct 2021 07:31 )             44.7     10-03    139  |  103  |  13  ----------------------------<  122<H>  4.0   |  32<H>  |  0.37<L>    Ca    9.1      03 Oct 2021 07:50          CAPILLARY BLOOD GLUCOSE          RADIOLOGY & ADDITIONAL TESTS:    Imaging reports  Personally Reviewed:  [ ] YES  [ ] NO    Consultant(s) Notes Reviewed:  [x ] YES  [ ] NO    Care Discussed with Consultants/Other Providers [x ] YES  [ ] NO  Problem/Plan - 1:  ·  Problem: Ambulatory dysfunction.   ·  Plan: -Patient p/w knee pain and inability to ambulate   -Topical NSAID       Problem/Plan - 2:  ·  Problem: Pneumonia.   ·  Plan: -observe  with  ceftriaxone  vancoas  per  ID  -  Problem/Plan - 3:  ·  Problem: Chronic venous stasis dermatitis.   ·  Plan: -Patient noted to have chronic venous stasis  changes, no s/s of infection   -Keep legs elevated.    Problem/Plan - 4:  ·  Problem: Thyroid nodule.   endocrinology  evaluation      Problem/Plan - 5:  ·  Problem: HTN (hypertension).   ·  Plan: -Patient has hx of HTN metoprolol.    Problem/Plan - 6:  ·  Problem: Preventive measure.   ·  Plan: Lovenox S/C.

## 2021-10-04 NOTE — CONSULT NOTE ADULT - PROBLEM SELECTOR RECOMMENDATION 9
thyroid function tests normal   ultrasound with large goiter B/L . 6.5 cm B/L , largest nodule 1.6 cm and also old standing calcifications   compressive symptoms and signs could not be elucidated   work up including nay FNA can be done as an out-patient   Thank You for the courtesy of this consultation !!!

## 2021-10-04 NOTE — PROGRESS NOTE ADULT - SUBJECTIVE AND OBJECTIVE BOX
TMAX - 99,4    On day # 5 Rocephin / # 5 po Zithromax / # 3 Vanco    Vital Signs Last 24 Hrs  T(C): 37.1 (04 Oct 2021 13:13), Max: 37.4 (04 Oct 2021 05:30)  T(F): 98.8 (04 Oct 2021 13:13), Max: 99.4 (04 Oct 2021 05:30)  HR: 86 (04 Oct 2021 13:13) (69 - 107)  BP: 124/75 (04 Oct 2021 13:13) (124/75 - 146/74)  BP(mean): --  RR: 18 (04 Oct 2021 13:13) (17 - 18)  SpO2: 87% (04 Oct 2021 13:13) (87% - 99%)  Supplemental O2:  on NC O2 now      Awakens, but very uncooperative today.  Yelling when she is moved.  Denies any SOB or cp and reports coughing intermittently.  Noted O2 sat's from 87 - 99% currently on NC O2.      PHYSICAL EXAM  General: 80 y/o white female awake, alert, lying  in bed now with NC O2 in place now,  very agitated and uncooperative now  HEENT:  conj pink, sclerae anicteric, PERRLA, no oral lesions noted, mucosa slightly dry  Neck: supple, no nodes noted  Heart: RR  Chest Wall : well-healed Lt Mastectomy site  Lungs: decreased BS bilaterally at the bases   Abdomen: BS+, semi-soft, nontender to palpation, well-healed RUQ scar, well-healed lower abdominal scar, no masses or HS-megaly detected  Back: no CVA or Spinal tenderness elicited to palpation  Extremities: 1+ edema both LE's with decreased warmth and fading darker  erythema from above the ankle to about 1/3 up the calf with some dry scaling skin noted - nontender to palpation now                    well-healed scar of the Lt. Elbow - no  surrounding erythema or swelling noted  Skin: warm, dry, no rash  External female catheter in place and draining slightly pink- colored urine      I&O's Summary :    03 Oct 2021 07:01  -  04 Oct 2021 07:00  --------------------------------------------------------  IN: 790 mL / OUT: 2600 mL / NET: -1810 mL      LABS:  CBC Full  -  ( 04 Oct 2021 07:31 )  WBC Count : 8.41 K/uL  RBC Count : 4.69 M/uL  Hemoglobin : 14.0 g/dL  Hematocrit : 44.7 %  Platelet Count - Automated : 230 K/uL  Mean Cell Volume : 95.3 fl  Mean Cell Hemoglobin : 29.9 pg  Mean Cell Hemoglobin Concentration : 31.3 gm/dL  Auto Neutrophil # : 5.99 K/uL  Auto Lymphocyte # : 1.18 K/uL  Auto Monocyte # : 0.78 K/uL  Auto Eosinophil # : 0.35 K/uL  Auto Basophil # : 0.07 K/uL  Auto Neutrophil % : 71.2 %  Auto Lymphocyte % : 14.0 %  Auto Monocyte % : 9.3 %  Auto Eosinophil % : 4.2 %  Auto Basophil % : 0.8 %    10-04    138  |  102  |  13  ----------------------------<  110<H>  4.2   |  29  |  0.31<L>    Ca    9.1      04 Oct 2021 07:31    TPro  6.5  /  Alb  2.1<L>  /  TBili  0.6  /  DBili  x   /  AST  31  /  ALT  38  /  AlkPhos  53  10-04    LIVER FUNCTIONS - ( 04 Oct 2021 07:31 )  Alb: 2.1 g/dL / Pro: 6.5 gm/dL / ALK PHOS: 53 U/L / ALT: 38 U/L / AST: 31 U/L / GGT: x           Sedimentation Rate, Erythrocyte: 62 mm/hr (10-02 @ 08:36)  Sedimentation Rate, Erythrocyte: 34 mm/hr (09-30 @ 08:09)    C-Reactive Protein, Serum (10.04.21 @ 10:26)    C-Reactive Protein, Serum: 46 mg/L    C-Reactive Protein, Serum (10.02.21 @ 11:51)    C-Reactive Protein, Serum: 84 mg/L    C-Reactive Protein, Serum (09.30.21 @ 12:58)    C-Reactive Protein, Serum: 90 mg/L    Procalcitonin, Serum (10.01.21 @ 11:05)    Procalcitonin, Serum: 0.06:     Procalcitonin, Serum (09.30.21 @ 15:28)    Procalcitonin, Serum: 0.11:     Lactate, Blood (09.29.21 @ 22:24)    Lactate, Blood: 1.4 mmol/L    Mycoplasma Pneumoniae IgM Antibody (09.30.21 @ 12:41)    Mycoplasma IgM AB: 0.38 Index    Mycoplasma: Negative: Method USAMA           Interpretation:                                             Index                  Negative              <=0.90                  Equivocal            0.91-1.09                  Positive                >=1.10        Triiodothyronine, Total (T3 Total) (10.02.21 @ 11:51)    Triiodothyronine, Total (T3 Total): 103 ng/dL    T4, Serum (10.02.21 @ 11:51)    T4, Serum: 7.4 ug/dL    Thyroid Stimulating Hormone, Serum (10.02.21 @ 08:36)    Thyroid Stimulating Hormone, Serum: 0.655 uIU/mL    Thyroid Stimulating Hormone, Serum (09.30.21 @ 08:09)    Thyroid Stimulating Hormone, Serum: 0.639 uIU/mL      Vancomycin Level, Trough: 9.3 ug/mL (10-03 @ 20:53)        MICROBIOLOGY:  Specimen Source: .Blood Blood (09-30 @ 09:04)  Culture Results:   Growth in aerobic bottle: Staphylococcus simulans  Coag Negative Staphylococcus  Single set isolate, possible contaminant. Contact  Microbiology if susceptibility testing clinically  indicated.    Specimen Source: .Blood Blood (09-30 @ 09:04)  Culture Results:   No growth to date. (09-30 @ 09:04)    Specimen Source: Clean Catch Clean Catch (Midstream) (09-30 @ 08:57)  Culture Results:   <10,000 CFU/mL Normal Urogenital Nikki (09-30 @ 08:57)      Legionella Antigen, Urine: Negative (10-02 @ 11:26)    COVID-19 Shahab Domain Antibody (10.01.21 @ 10:46)    COVID-19 Shahab Domain Antibody Result: 0.40: Roche ECLIA Total AB (KIRSTIN)  NOTE: This result index represents a total antibody measurement, which  includes IgG, IgA and IgM.  Measures Receptor Binding Domain of the Shahab Protein  Negative <= 0.79 U/mL  Positive  >= 0.80 U/mL U/mL    COVID-19 Shahab Domain AB Interp: Negative: This test has been authorized for emergency use by the FDA     Flu With COVID-19 By VONDA (09.29.21 @ 16:52)    SARS-CoV-2 Result: NotDetec: EUA/IVD  This Respiratory Panel uses polymerase chain reaction (PCR) to detect for  influenza A; influenza B; and SARS-CoV-2.    Influenza A Result: NotDetec: EUA/IVD    Influenza B Result: NotDetec: EUA/IVD      Radiology:                < from: US Duplex Venous Lower Ext Complete, Bilateral (10.01.21 @ 19:29) >  EXAM:  US DPLX LWR EXT VEINS COMPL BI                        PROCEDURE DATE:  10/01/2021    INTERPRETATION:  CLINICAL INFORMATION: 81 years  Female with r/o DVT. Bilateral lower extremity swelling, tenderness and erythema.  COMPARISON: None available.  TECHNIQUE: Duplex sonography of the BILATERAL LOWER extremity veins with color and spectral Doppler, with and without compression.  FINDINGS:  RIGHT:  Normal compressibility of the RIGHT common femoral, femoral and popliteal veins.  Doppler examination shows normal spontaneous and phasic flow.  No RIGHT calf vein thrombosis is detected.    LEFT:  Normal compressibility of the LEFT common femoral, femoral and popliteal veins.  Doppler examination shows normal spontaneous and phasic flow.  No LEFT calf vein thrombosis is detected.    IMPRESSION:  No evidence of deep venous thrombosis in either lower extremity.                 < from: US Thyroid (09.30.21 @ 08:42) >  EXAM:  US THYROID ONLY                        PROCEDURE DATE:  09/30/2021    INTERPRETATION:  Indication: Abnormal thyroid on CT of 9/29/2021.  Bilateral thyroid ultrasound.  Right thyroid lobe 6.2 x 3.4 x 3.4 cm. Left thyroid lobe 6.1 x 3.9 x 3.9 cm. Isthmus measures 0.54 cm AP. Multiple bilateral solid nodules, some with calcifications. The dominant nodule in the right lower pole measures 1.6 cm.    IMPRESSION: Enlarged multinodular thyroid as discussed. Consider biopsy of the dominant nodule for tissue specific diagnosis.            < from: CT Chest No Cont (09.29.21 @ 20:48) >  EXAM:  CT CHEST                        PROCEDURE DATE:  09/29/2021    INTERPRETATION:  CLINICAL INFORMATION: weakness, evaluate for pneumonia.  COMPARISON: Comparison to numerous prior examinations most recent on 9/29/2021  CONTRAST/COMPLICATIONS:  IV Contrast: None  Oral Contrast: None  Complications: None  PROCEDURE:  CT of the Chest was performed.  Sagittal and coronal reformats were performed.  FINDINGS:    LUNGS AND AIRWAYS: Patent central airways.  Multifocal consolidations in the right lower and left lower lobes. Subsegmental atelectasis versus scarring bilateral upper lobes and bilateral lower lobes.  PLEURA: No pleural effusion.  MEDIASTINUM AND ABNER: No lymphadenopathy.  VESSELS: Atherosclerotic calcifications ofthe aorta and coronary arteries.  HEART: Heart size is normal. No pericardial effusion.  CHEST WALL AND LOWER NECK: Status post left mastectomy and left axillary dissection. Enlarged left lobe of the thyroid with multiple hypoattenuating nodules and small calcifications.  VISUALIZED UPPER ABDOMEN: Moderate size hiatal hernia. Simple cyst in the left kidney measuring 2.4 cm.  BONES: Degenerative changes. Small sclerotic focus at the right glenoid (7, 6).    IMPRESSION:    Multifocal consolidations in the right and left lower lobes, correlate clinically for pneumonia.    Enlarged left lobe of the thyroid gland with multiple hypoattenuating nodules and small calcifications, recommend nonemergent thyroid ultrasound.    Small sclerotic focus in the right glenoid, recommend bone scan for further evaluation.                   < from: Xray Knee 3 Views, Bilateral (09.29.21 @ 16:41) >  EXAM:  XR KNEE 3 VIEWS BI                        PROCEDURE DATE:  09/29/2021    INTERPRETATION:  Bilateral knees  HISTORY:Difficult ambulation for two days   Three views of the right knee and three views of the left knee show no evidence of acute fracture nor destructive change. The joint spaces show mild arthritic changes of the medial compartments with calcified menisci bilaterally. Vascular calcifications are present. Small nodule of the lower right thigh may be in the skin asit is not visualized in the right lateral image.    IMPRESSION: No acute bony pathology.    Note - This does not exclude fractures in perfect alignment and apposition as presence may be indicated at one to three weeks following callus formation.    < from: Xray Chest 1 View- PORTABLE-Urgent (Xray Chest 1 View- PORTABLE-Urgent .) (09.29.21 @ 16:40) >  EXAM:  XR CHEST PORTABLE URGENT 1V                        PROCEDURE DATE:  09/29/2021    INTERPRETATION:  Chest one view  HISTORY: Weakness  COMPARISON STUDY: 5/5/2019  Frontal expiratory view of the chest shows the heart to be similar in size. The lungs show bilateral patchy infiltrates, right more than left and there is no evidence of pneumothorax nor pleural effusion.    IMPRESSION:  Patchy infiltrates.  Further information may be obtained from the patient's subsequent CT of the chest.             Impression:     80 y/o white female with hx of HTN, Lt. Breast Ca, s/p Lt. Mastectomy with Lymph Node Dissection,  s/p Cholecystectomy, s/p Fx/ Dislocation of the Lt Elbow requiring Lt. Elbow ORIF in 5/19 at [facility], recent Cellulitis of the LE's requiring ab rx about 1 month ago and subsequent rx with a diuretic for continued swelling of the LE's with ultimate improvement,  reported Short-term memory loss, chronic knee pain, admitted via the ER on 9/29/21 with c/o difficulty ambulating and w/u revealed a fever to 100.3, leukocytosis, and Multifocal Bilateral Lower Lobe Consolidations on CXR and CT of the Chest along with an enlarged/ multi-nodular Thyroid gland and a Sclerotic area in the Rt. Glenoid.  Patient was begun on Rocephin + Zithromax po and now with 1 blood Cx out of 2 growing Gm Positive Cocci in Clusters confirmed as Staph Simulans.  Sepsis presumably due to Multifocal Bilateral lower Lobe PNA and ? CNS  Bacteremia may represent a contaminant, however patient has underlying hardware in the Lt Elbow along with apparent ongoing/ recurrent Cellulitis of the LE's superimposed on underlying venous stasis changes.  Noted that venous Doppler of the LE's is Negative for DVT.  Noted COVID-19 Ab is negative ( consistent with hx that patient has NOT been vaccinated as yet ).  Thyroid profile is WNL and patient evaluated by Endocrinology re: enlarged Multinodular Thyroid with recommendation for FNA as outpatient.  Legionella Urine Ag is now Negative.     Suggestions:   Will therefore continue current ab rx with Rocephin + Zithromax +d/c  Vanco now as further Blood Cx's remain Negative.   Await results of Chlamydia pneumoniae ab's.  Follow-up temps. labs. and O2 Sat's.  Repeat CXR in a few days.  When patient is clinically improved would recommend COVID-19 Vaccination if she is amenable to this.

## 2021-10-05 LAB
ANION GAP SERPL CALC-SCNC: 5 MMOL/L — SIGNIFICANT CHANGE UP (ref 5–17)
BUN SERPL-MCNC: 15 MG/DL — SIGNIFICANT CHANGE UP (ref 7–23)
CALCIUM SERPL-MCNC: 9.6 MG/DL — SIGNIFICANT CHANGE UP (ref 8.5–10.1)
CHLORIDE SERPL-SCNC: 101 MMOL/L — SIGNIFICANT CHANGE UP (ref 96–108)
CO2 SERPL-SCNC: 33 MMOL/L — HIGH (ref 22–31)
CREAT SERPL-MCNC: 0.35 MG/DL — LOW (ref 0.5–1.3)
CULTURE RESULTS: SIGNIFICANT CHANGE UP
ERYTHROCYTE [SEDIMENTATION RATE] IN BLOOD: 66 MM/HR — HIGH (ref 0–20)
GLUCOSE SERPL-MCNC: 109 MG/DL — HIGH (ref 70–99)
HCT VFR BLD CALC: 45.5 % — HIGH (ref 34.5–45)
HGB BLD-MCNC: 14.3 G/DL — SIGNIFICANT CHANGE UP (ref 11.5–15.5)
MCHC RBC-ENTMCNC: 29.7 PG — SIGNIFICANT CHANGE UP (ref 27–34)
MCHC RBC-ENTMCNC: 31.4 GM/DL — LOW (ref 32–36)
MCV RBC AUTO: 94.6 FL — SIGNIFICANT CHANGE UP (ref 80–100)
NRBC # BLD: 0 /100 WBCS — SIGNIFICANT CHANGE UP (ref 0–0)
PLATELET # BLD AUTO: 252 K/UL — SIGNIFICANT CHANGE UP (ref 150–400)
POTASSIUM SERPL-MCNC: 4 MMOL/L — SIGNIFICANT CHANGE UP (ref 3.5–5.3)
POTASSIUM SERPL-SCNC: 4 MMOL/L — SIGNIFICANT CHANGE UP (ref 3.5–5.3)
RBC # BLD: 4.81 M/UL — SIGNIFICANT CHANGE UP (ref 3.8–5.2)
RBC # FLD: 13.2 % — SIGNIFICANT CHANGE UP (ref 10.3–14.5)
SODIUM SERPL-SCNC: 139 MMOL/L — SIGNIFICANT CHANGE UP (ref 135–145)
SPECIMEN SOURCE: SIGNIFICANT CHANGE UP
WBC # BLD: 9.68 K/UL — SIGNIFICANT CHANGE UP (ref 3.8–10.5)
WBC # FLD AUTO: 9.68 K/UL — SIGNIFICANT CHANGE UP (ref 3.8–10.5)

## 2021-10-05 PROCEDURE — 71045 X-RAY EXAM CHEST 1 VIEW: CPT | Mod: 26

## 2021-10-05 RX ORDER — CEFTRIAXONE 500 MG/1
1000 INJECTION, POWDER, FOR SOLUTION INTRAMUSCULAR; INTRAVENOUS EVERY 24 HOURS
Refills: 0 | Status: DISCONTINUED | OUTPATIENT
Start: 2021-10-05 | End: 2021-10-08

## 2021-10-05 RX ADMIN — Medication 25 MILLIGRAM(S): at 18:21

## 2021-10-05 RX ADMIN — Medication 1 APPLICATION(S): at 07:41

## 2021-10-05 RX ADMIN — AZITHROMYCIN 500 MILLIGRAM(S): 500 TABLET, FILM COATED ORAL at 13:07

## 2021-10-05 RX ADMIN — Medication 1 APPLICATION(S): at 18:24

## 2021-10-05 RX ADMIN — CEFTRIAXONE 100 MILLIGRAM(S): 500 INJECTION, POWDER, FOR SOLUTION INTRAMUSCULAR; INTRAVENOUS at 21:15

## 2021-10-05 RX ADMIN — Medication 1 TABLET(S): at 18:21

## 2021-10-05 RX ADMIN — ENOXAPARIN SODIUM 40 MILLIGRAM(S): 100 INJECTION SUBCUTANEOUS at 13:07

## 2021-10-05 RX ADMIN — Medication 1 TABLET(S): at 06:20

## 2021-10-05 RX ADMIN — Medication 25 MILLIGRAM(S): at 06:20

## 2021-10-05 NOTE — PROGRESS NOTE ADULT - SUBJECTIVE AND OBJECTIVE BOX
INTERVAL HPI/OVERNIGHT EVENTS:    patient  has  been  ofe on  azithromycin since 10/1/21     REVIEW OF SYSTEMS:  CONSTITUTIONAL:  no  complaints     NECK: No pain or stiffnes  RESPIRATORY: No SOB   CARDIOVASCULAR: No chest pain, palpitations, dizziness,   GASTROINTESTINAL: No abdominal pain. No nausea, vomiting,   NEUROLOGICAL: No headaches, no  blurry  vision no  dizziness  SKIN: No itching,   MUSCULOSKELETAL: No pain    MEDICATION:  azithromycin   Tablet 500 milliGRAM(s) Oral daily  enoxaparin Injectable 40 milliGRAM(s) SubCutaneous daily  lactobacillus acidophilus 1 Tablet(s) Oral two times a day  metoprolol tartrate 25 milliGRAM(s) Oral two times a day  mineral oil for Topical Use 1 Application(s) Topical two times a day    Vital Signs Last 24 Hrs  T(C): 37.3 (05 Oct 2021 11:34), Max: 37.4 (04 Oct 2021 17:18)  T(F): 99.1 (05 Oct 2021 11:34), Max: 99.3 (04 Oct 2021 17:18)  HR: 98 (05 Oct 2021 11:34) (76 - 98)  BP: 109/76 (05 Oct 2021 11:34) (109/76 - 142/70)  BP(mean): --  RR: 18 (05 Oct 2021 11:34) (17 - 18)  SpO2: 93% (05 Oct 2021 11:34) (87% - 93%)    PHYSICAL EXAM:  GENERAL: NAD, well-groomed, well-developed  HEENT : Conjuntivae  clear sclerae anicteric  NECK: Supple, No JVD, Normal thyroid  NERVOUS SYSTEM:  Alert oriented   no  focal  deficits;   CHEST/LUNG: Clear    HEART: Regular rate and rhythm; No murmurs, rubs, or gallops  ABDOMEN: Soft, Nontender, Nondistended; Bowel sounds present  EXTREMITIES:  no  edema no  tenderness  SKIN: No rashes   LABS:                        14.3   9.68  )-----------( 252      ( 05 Oct 2021 06:30 )             45.5     10-05    139  |  101  |  15  ----------------------------<  109<H>  4.0   |  33<H>  |  0.35<L>    Ca    9.6      05 Oct 2021 06:30    TPro  6.5  /  Alb  2.1<L>  /  TBili  0.6  /  DBili  x   /  AST  31  /  ALT  38  /  AlkPhos  53  10-04        CAPILLARY BLOOD GLUCOSE          RADIOLOGY & ADDITIONAL TESTS:    Imaging reports  Personally Reviewed:  [ ] YES  [ ] NO    Consultant(s) Notes Reviewed:  [x ] YES  [ ] NO    Care Discussed with Consultants/Other Providers [ x] YES  [ ] NO  Problem/Plan - 1:  ·  Problem: Ambulatory dysfunction.   ·  Plan: -Patient p/w knee pain and inability to ambulate   -Topical NSAID       Problem/Plan - 2:  ·  Problem: Pneumonia.   ·  Plan:   Azithromycin  clinically  improved   recheck  CXR  dsicharge  to  rehab  if  continued  improvment  -  Problem/Plan - 3:  ·  Problem: Chronic venous stasis dermatitis.   ·  Plan: -Patient noted to have chronic venous stasis  changes, no s/s of infection   -Keep legs elevated.    Problem/Plan - 4:  ·  Problem: Thyroid nodule.   endocrinology  evaluation      Problem/Plan - 5:  ·  Problem: HTN (hypertension).   ·  Plan: -Patient has hx of HTN metoprolol.    Problem/Plan - 6:  ·  Problem: Preventive measure.   ·  Plan: Lovenox S/C.     INTERVAL HPI/OVERNIGHT EVENTS:    patient  has  been  ofe on  azithromycin since 10/1/21     REVIEW OF SYSTEMS:  CONSTITUTIONAL:  no  complaints     NECK: No pain or stiffnes  RESPIRATORY: No SOB   CARDIOVASCULAR: No chest pain, palpitations, dizziness,   GASTROINTESTINAL: No abdominal pain. No nausea, vomiting,   NEUROLOGICAL: No headaches, no  blurry  vision no  dizziness  SKIN: No itching,   MUSCULOSKELETAL: No pain    MEDICATION:  azithromycin   Tablet 500 milliGRAM(s) Oral daily  enoxaparin Injectable 40 milliGRAM(s) SubCutaneous daily  lactobacillus acidophilus 1 Tablet(s) Oral two times a day  metoprolol tartrate 25 milliGRAM(s) Oral two times a day  mineral oil for Topical Use 1 Application(s) Topical two times a day    Vital Signs Last 24 Hrs  T(C): 37.3 (05 Oct 2021 11:34), Max: 37.4 (04 Oct 2021 17:18)  T(F): 99.1 (05 Oct 2021 11:34), Max: 99.3 (04 Oct 2021 17:18)  HR: 98 (05 Oct 2021 11:34) (76 - 98)  BP: 109/76 (05 Oct 2021 11:34) (109/76 - 142/70)  BP(mean): --  RR: 18 (05 Oct 2021 11:34) (17 - 18)  SpO2: 93% (05 Oct 2021 11:34) (87% - 93%)    PHYSICAL EXAM:  GENERAL: NAD, well-groomed, well-developed  HEENT : Conjuntivae  clear sclerae anicteric  NECK: Supple, No JVD, Normal thyroid  NERVOUS SYSTEM:  Alert oriented   no  focal  deficits;   CHEST/LUNG: Clear    HEART: Regular rate and rhythm; No murmurs, rubs, or gallops  ABDOMEN: Soft, Nontender, Nondistended; Bowel sounds present  EXTREMITIES:  no  edema no  tenderness  SKIN: No rashes   LABS:                        14.3   9.68  )-----------( 252      ( 05 Oct 2021 06:30 )             45.5     10-05    139  |  101  |  15  ----------------------------<  109<H>  4.0   |  33<H>  |  0.35<L>    Ca    9.6      05 Oct 2021 06:30    TPro  6.5  /  Alb  2.1<L>  /  TBili  0.6  /  DBili  x   /  AST  31  /  ALT  38  /  AlkPhos  53  10-04        CAPILLARY BLOOD GLUCOSE          RADIOLOGY & ADDITIONAL TESTS:    Imaging reports  Personally Reviewed:  [ ] YES  [ ] NO    Consultant(s) Notes Reviewed:  [x ] YES  [ ] NO    Care Discussed with Consultants/Other Providers [ x] YES  [ ] NO  Problem/Plan - 1:  ·  Problem: Ambulatory dysfunction.   ·  Plan: -Patient p/w knee pain and inability to ambulate   -Topical NSAID       Problem/Plan - 2:  ·  Problem: Pneumonia.   ·  Plan:   Azithromycin  completed  riocephin clinically  improved   recheck  CXR  dsicharge  to  rehab  if  continued  improvment  -  Problem/Plan - 3:  ·  Problem: Chronic venous stasis dermatitis.   ·  Plan: -Patient noted to have chronic venous stasis  changes, no s/s of infection   -Keep legs elevated.    Problem/Plan - 4:  ·  Problem: Thyroid nodule.   endocrinology  evaluation      Problem/Plan - 5:  ·  Problem: HTN (hypertension).   ·  Plan: -Patient has hx of HTN metoprolol.    Problem/Plan - 6:  ·  Problem: Preventive measure.   ·  Plan: Lovenox S/C.

## 2021-10-05 NOTE — PROGRESS NOTE ADULT - SUBJECTIVE AND OBJECTIVE BOX
TMAX - 99.4    On day #  6 Rocephin / # 6 po Zithromax    Vital Signs Last 24 Hrs  T(C): 37.3 (05 Oct 2021 11:34), Max: 37.4 (04 Oct 2021 17:18)  T(F): 99.1 (05 Oct 2021 11:34), Max: 99.3 (04 Oct 2021 17:18)  HR: 98 (05 Oct 2021 11:34) (76 - 98)  BP: 109/76 (05 Oct 2021 11:34) (109/76 - 142/70)  BP(mean): --  RR: 18 (05 Oct 2021 11:34) (17 - 18)  SpO2: 93% (05 Oct 2021 11:34) (90% - 93%)  Supplemental O2: on NC O2 now    Awake, alert, no c/o cp or SOB but using her NC O2 now.  Still with intermittent cough but claims it is improving and she is still swallowing the mucous.  Appetite is fair and no c/o N/V/D.  C/o some pain in the Rt. Knee when she moves.  O2 Sat's are between 90 - 93% reportedly on RA but patient is currently using NC O2.       PHYSICAL EXAM  General: 80 y/o white female awake, alert, lying  in bed now  in semi-upright position, with NC O2 in place, very pleasant and cooperative today although she remains confused to events  HEENT:  conj pink, sclerae anicteric, PERRLA, no oral lesions noted, mucosa slightly dry  Neck: supple, no nodes noted  Heart: RR  Chest Wall : well-healed Lt Mastectomy site  Lungs:  rales bilaterally at the bases   Abdomen: BS+, semi-soft, nontender to palpation, well-healed RUQ scar, well-healed lower abdominal scar, no masses or HS-megaly detected  Back: no CVA or Spinal tenderness elicited to palpation  Extremities: 1+ edema both LE's with decreased warmth and fading darker  erythema from above the ankle to about 1/3 up the calf with some dry scaling skin noted - nontender to palpation now                    well-healed scar of the Lt. Elbow - no  surrounding erythema or swelling noted  Skin: warm, dry, no rash  External female catheter in place and draining light grady- colored urine now      I&O's Summary :    04 Oct 2021 07:01  -  05 Oct 2021 07:00  --------------------------------------------------------  IN: 50 mL / OUT: 330 mL / NET: -280 mL      LABS:  CBC Full  -  ( 05 Oct 2021 06:30 )  WBC Count : 9.68 K/uL  RBC Count : 4.81 M/uL  Hemoglobin : 14.3 g/dL  Hematocrit : 45.5 %  Platelet Count - Automated : 252 K/uL  Mean Cell Volume : 94.6 fl  Mean Cell Hemoglobin : 29.7 pg  Mean Cell Hemoglobin Concentration : 31.4 gm/dL  Auto Neutrophil # : x  Auto Lymphocyte # : x  Auto Monocyte # : x  Auto Eosinophil # : x  Auto Basophil # : x  Auto Neutrophil % : x  Auto Lymphocyte % : x  Auto Monocyte % : x  Auto Eosinophil % : x  Auto Basophil % : x    10-05    139  |  101  |  15  ----------------------------<  109<H>  4.0   |  33<H>  |  0.35<L>    Ca    9.6      05 Oct 2021 06:30    TPro  6.5  /  Alb  2.1<L>  /  TBili  0.6  /  DBili  x   /  AST  31  /  ALT  38  /  AlkPhos  53  10-04    LIVER FUNCTIONS - ( 04 Oct 2021 07:31 )  Alb: 2.1 g/dL / Pro: 6.5 gm/dL / ALK PHOS: 53 U/L / ALT: 38 U/L / AST: 31 U/L / GGT: x           Sedimentation Rate, Erythrocyte: 66 mm/hr (10-05 @ 06:30)    Sedimentation Rate, Erythrocyte: 62 mm/hr (10-02 @ 08:36)  Sedimentation Rate, Erythrocyte: 34 mm/hr (09-30 @ 08:09)    C-Reactive Protein, Serum (10.04.21 @ 10:26)    C-Reactive Protein, Serum: 46 mg/L    C-Reactive Protein, Serum (10.02.21 @ 11:51)    C-Reactive Protein, Serum: 84 mg/L    C-Reactive Protein, Serum (09.30.21 @ 12:58)    C-Reactive Protein, Serum: 90 mg/L    Procalcitonin, Serum (10.01.21 @ 11:05)    Procalcitonin, Serum: 0.06:     Procalcitonin, Serum (09.30.21 @ 15:28)    Procalcitonin, Serum: 0.11:     Lactate, Blood (09.29.21 @ 22:24)    Lactate, Blood: 1.4 mmol/L    Mycoplasma Pneumoniae IgM Antibody (09.30.21 @ 12:41)    Mycoplasma IgM AB: 0.38 Index    Mycoplasma: Negative: Method USAMA           Interpretation:                                             Index                  Negative              <=0.90                  Equivocal            0.91-1.09                  Positive                >=1.10        Triiodothyronine, Total (T3 Total) (10.02.21 @ 11:51)    Triiodothyronine, Total (T3 Total): 103 ng/dL    T4, Serum (10.02.21 @ 11:51)    T4, Serum: 7.4 ug/dL    Thyroid Stimulating Hormone, Serum (10.02.21 @ 08:36)    Thyroid Stimulating Hormone, Serum: 0.655 uIU/mL    Thyroid Stimulating Hormone, Serum (09.30.21 @ 08:09)    Thyroid Stimulating Hormone, Serum: 0.639 uIU/mL        MICROBIOLOGY:  Specimen Source: .Blood Blood (09-30 @ 09:04)  Culture Results:   Growth in aerobic bottle: Staphylococcus simulans  Coag Negative Staphylococcus  Single set isolate, possible contaminant. Contact  Microbiology if susceptibility testing clinically  indicated.    Specimen Source: .Blood Blood (09-30 @ 09:04)  Culture Results:   No Growth Final (09-30 @ 09:04)    Specimen Source: Clean Catch Clean Catch (Midstream) (09-30 @ 08:57)  Culture Results:   <10,000 CFU/mL Normal Urogenital Nikki (09-30 @ 08:57)      Legionella Antigen, Urine: Negative (10-02 @ 11:26)    COVID-19 Shahab Domain Antibody (10.01.21 @ 10:46)    COVID-19 Shahab Domain Antibody Result: 0.40: Roche ECLIA Total AB (KIRSTIN)  NOTE: This result index represents a total antibody measurement, which  includes IgG, IgA and IgM.  Measures Receptor Binding Domain of the Shahab Protein  Negative <= 0.79 U/mL  Positive  >= 0.80 U/mL U/mL    COVID-19 Shahab Domain AB Interp: Negative: This test has been authorized for emergency use by the FDA     Flu With COVID-19 By VONDA (09.29.21 @ 16:52)    SARS-CoV-2 Result: NotDetec: EUA/IVD  This Respiratory Panel uses polymerase chain reaction (PCR) to detect for  influenza A; influenza B; and SARS-CoV-2.    Influenza A Result: NotDetec: EUA/IVD    Influenza B Result: NotDetec: EUA/IVD      Radiology:             < from: US Duplex Venous Lower Ext Complete, Bilateral (10.01.21 @ 19:29) >  EXAM:  US DPLX LWR EXT VEINS COMPL BI                        PROCEDURE DATE:  10/01/2021    INTERPRETATION:  CLINICAL INFORMATION: 81 years  Female with r/o DVT. Bilateral lower extremity swelling, tenderness and erythema.  COMPARISON: None available.  TECHNIQUE: Duplex sonography of the BILATERAL LOWER extremity veins with color and spectral Doppler, with and without compression.  FINDINGS:  RIGHT:  Normal compressibility of the RIGHT common femoral, femoral and popliteal veins.  Doppler examination shows normal spontaneous and phasic flow.  No RIGHT calf vein thrombosis is detected.    LEFT:  Normal compressibility of the LEFT common femoral, femoral and popliteal veins.  Doppler examination shows normal spontaneous and phasic flow.  No LEFT calf vein thrombosis is detected.    IMPRESSION:  No evidence of deep venous thrombosis in either lower extremity.                 < from: US Thyroid (09.30.21 @ 08:42) >  EXAM:  US THYROID ONLY                        PROCEDURE DATE:  09/30/2021    INTERPRETATION:  Indication: Abnormal thyroid on CT of 9/29/2021.  Bilateral thyroid ultrasound.  Right thyroid lobe 6.2 x 3.4 x 3.4 cm. Left thyroid lobe 6.1 x 3.9 x 3.9 cm. Isthmus measures 0.54 cm AP. Multiple bilateral solid nodules, some with calcifications. The dominant nodule in the right lower pole measures 1.6 cm.    IMPRESSION: Enlarged multinodular thyroid as discussed. Consider biopsy of the dominant nodule for tissue specific diagnosis.            < from: CT Chest No Cont (09.29.21 @ 20:48) >  EXAM:  CT CHEST                        PROCEDURE DATE:  09/29/2021    INTERPRETATION:  CLINICAL INFORMATION: weakness, evaluate for pneumonia.  COMPARISON: Comparison to numerous prior examinations most recent on 9/29/2021  CONTRAST/COMPLICATIONS:  IV Contrast: None  Oral Contrast: None  Complications: None  PROCEDURE:  CT of the Chest was performed.  Sagittal and coronal reformats were performed.  FINDINGS:    LUNGS AND AIRWAYS: Patent central airways.  Multifocal consolidations in the right lower and left lower lobes. Subsegmental atelectasis versus scarring bilateral upper lobes and bilateral lower lobes.  PLEURA: No pleural effusion.  MEDIASTINUM AND ABNER: No lymphadenopathy.  VESSELS: Atherosclerotic calcifications ofthe aorta and coronary arteries.  HEART: Heart size is normal. No pericardial effusion.  CHEST WALL AND LOWER NECK: Status post left mastectomy and left axillary dissection. Enlarged left lobe of the thyroid with multiple hypoattenuating nodules and small calcifications.  VISUALIZED UPPER ABDOMEN: Moderate size hiatal hernia. Simple cyst in the left kidney measuring 2.4 cm.  BONES: Degenerative changes. Small sclerotic focus at the right glenoid (7, 6).    IMPRESSION:    Multifocal consolidations in the right and left lower lobes, correlate clinically for pneumonia.    Enlarged left lobe of the thyroid gland with multiple hypoattenuating nodules and small calcifications, recommend nonemergent thyroid ultrasound.    Small sclerotic focus in the right glenoid, recommend bone scan for further evaluation.                   < from: Xray Knee 3 Views, Bilateral (09.29.21 @ 16:41) >  EXAM:  XR KNEE 3 VIEWS BI                        PROCEDURE DATE:  09/29/2021    INTERPRETATION:  Bilateral knees  HISTORY:Difficult ambulation for two days   Three views of the right knee and three views of the left knee show no evidence of acute fracture nor destructive change. The joint spaces show mild arthritic changes of the medial compartments with calcified menisci bilaterally. Vascular calcifications are present. Small nodule of the lower right thigh may be in the skin asit is not visualized in the right lateral image.    IMPRESSION: No acute bony pathology.    Note - This does not exclude fractures in perfect alignment and apposition as presence may be indicated at one to three weeks following callus formation.    < from: Xray Chest 1 View- PORTABLE-Urgent (Xray Chest 1 View- PORTABLE-Urgent .) (09.29.21 @ 16:40) >  EXAM:  XR CHEST PORTABLE URGENT 1V                        PROCEDURE DATE:  09/29/2021    INTERPRETATION:  Chest one view  HISTORY: Weakness  COMPARISON STUDY: 5/5/2019  Frontal expiratory view of the chest shows the heart to be similar in size. The lungs show bilateral patchy infiltrates, right more than left and there is no evidence of pneumothorax nor pleural effusion.    IMPRESSION:  Patchy infiltrates.  Further information may be obtained from the patient's subsequent CT of the chest.            Impression:      80 y/o white female with hx of HTN, Lt. Breast Ca, s/p Lt. Mastectomy with Lymph Node Dissection,  s/p Cholecystectomy, s/p Fx/ Dislocation of the Lt Elbow requiring Lt. Elbow ORIF in 5/19 at [facility], recent Cellulitis of the LE's requiring ab rx about 1 month ago and subsequent rx with a diuretic for continued swelling of the LE's with ultimate improvement,  reported Short-term memory loss, chronic knee pain, admitted via the ER on 9/29/21 with c/o difficulty ambulating and w/u revealed a fever to 100.3, leukocytosis, and Multifocal Bilateral Lower Lobe Consolidations on CXR and CT of the Chest along with an enlarged/ multi-nodular Thyroid gland and a Sclerotic area in the Rt. Glenoid.  Patient was begun on Rocephin + Zithromax po and now with 1 blood Cx out of 2 growing Gm Positive Cocci in Clusters confirmed as Staph Simulans.  Sepsis presumably due to Multifocal Bilateral lower Lobe PNA and ? CNS  Bacteremia may represent a contaminant, however patient has underlying hardware in the Lt Elbow along with apparent ongoing/ recurrent Cellulitis of the LE's superimposed on underlying venous stasis changes.  Noted that venous Doppler of the LE's is Negative for DVT.  Noted COVID-19 Ab is negative ( consistent with hx that patient has NOT been vaccinated as yet ).  Thyroid profile is WNL and patient evaluated by Endocrinology re: enlarged Multinodular Thyroid with recommendation for FNA as outpatient.  Legionella Urine Ag is now Negative.    Suggestions:    Will therefore continue current ab rx with Rocephin + Zithromax for a few more days and  await results of Chlamydia pneumoniae ab's.  Follow-up temps. labs. and O2 Sat's.  Repeat CXR done this AM and results are pending.  When patient is clinically improved would recommend COVID-19 Vaccination  - discussed with patient today and she was amenable to this.

## 2021-10-05 NOTE — PROGRESS NOTE ADULT - SUBJECTIVE AND OBJECTIVE BOX
INTERVAL HPI:   81 year old female HTN, Short term memory loss and Chronic knee pain .  Presented  for weakness and knee pain.  She  reports feeling well and says that she was  brought to the hospital by people living in her house. She is a poor historian   Reported to be  treated  for b/l LE  cellulitis about a month ago, then started taking OTC water pills TID for the last one week, her swelling did improve.   However, for the last two days pt is unable to stand or walk without assistance, at baseline pt is able to ambulate on her own, family felt that her potassium level may be low due to the water pills she was taking.   IN  ED , CT chest showed: Multifocal consolidations in the right and left lower lobes.  Says she is a smoker.    OVERNIGHT EVENTS:  Resting comfortably.    Vital Signs Last 24 Hrs  T(C): 37.3 (05 Oct 2021 11:34), Max: 37.3 (05 Oct 2021 11:34)  T(F): 99.1 (05 Oct 2021 11:34), Max: 99.1 (05 Oct 2021 11:34)  HR: 98 (05 Oct 2021 11:34) (76 - 98)  BP: 109/76 (05 Oct 2021 11:34) (109/76 - 135/83)  BP(mean): --  RR: 18 (05 Oct 2021 11:34) (17 - 18)  SpO2: 93% (05 Oct 2021 11:34) (90% - 93%)    PHYSICAL EXAM:  GEN:        comfortable.  HEENT:    Normal.    RESP:       no distress  CVS:         Regular rate and rhythm.     MEDICATIONS  (STANDING):  azithromycin   Tablet 500 milliGRAM(s) Oral daily  cefTRIAXone   IVPB 1000 milliGRAM(s) IV Intermittent every 24 hours  enoxaparin Injectable 40 milliGRAM(s) SubCutaneous daily  lactobacillus acidophilus 1 Tablet(s) Oral two times a day  metoprolol tartrate 25 milliGRAM(s) Oral two times a day  mineral oil for Topical Use 1 Application(s) Topical two times a day    LABS:                        14.3   9.68  )-----------( 252      ( 05 Oct 2021 06:30 )             45.5     10-05    139  |  101  |  15  ----------------------------<  109<H>  4.0   |  33<H>  |  0.35<L>    Ca    9.6      05 Oct 2021 06:30    TPro  6.5  /  Alb  2.1<L>  /  TBili  0.6  /  DBili  x   /  AST  31  /  ALT  38  /  AlkPhos  53  10-04    ASSESSMENT AND PLAN:  ·	Multifocal pneumonia.  ·	Hypokalemia.  ·	HTN.  ·	Obesity.    SPO2 93% on nasal O2.  Continue antibiotics.  Out of bed to chair.

## 2021-10-05 NOTE — PROGRESS NOTE ADULT - SUBJECTIVE AND OBJECTIVE BOX
Patient is a 81y old  Female who presents with a chief complaint of pneumonia (05 Oct 2021 11:22)      Interval History: clinically euthyroid   no dysphagia or symptoms and signs of goiter   TSH 0.655    MEDICATIONS  (STANDING):  azithromycin   Tablet 500 milliGRAM(s) Oral daily  cefTRIAXone   IVPB 1000 milliGRAM(s) IV Intermittent every 24 hours  enoxaparin Injectable 40 milliGRAM(s) SubCutaneous daily  lactobacillus acidophilus 1 Tablet(s) Oral two times a day  metoprolol tartrate 25 milliGRAM(s) Oral two times a day  mineral oil for Topical Use 1 Application(s) Topical two times a day    MEDICATIONS  (PRN):      Allergies    No Known Allergies    Intolerances        REVIEW OF SYSTEMS:  CONSTITUTIONAL: no changes  EYES: No eye pain, visual disturbances, or discharge  ENMT:  No difficulty hearing, No sinus or throat pain  NECK: No pain or stiffness  RESPIRATORY: No cough, wheezing, chills or hemoptysis; No shortness of breath  CARDIOVASCULAR: No chest pain, palpitations or leg swelling  GASTROINTESTINAL: No abdominal or epigastric pain. No nausea, vomiting, or hematemesis; No diarrhea or constipation. No melena or hematochezia.  GENITOURINARY: No dysuria, frequency, hematuria, or incontinence  NEUROLOGICAL: No headaches, memory loss, loss of strength, numbness, or tremors  SKIN: No itching, burning, rashes, or lesions   ENDOCRINE: No heat or cold intolerance; No hair loss  MUSCULOSKELETAL: No joint pain or swelling; No muscle, back, or extremity pain  PSYCHIATRIC: No depression, anxiety, mood swings, or difficulty sleeping  HEME/LYMPH: No easy bruising, or bleeding gums  ALLERY AND IMMUNOLOGIC: No hives or eczema    Vital Signs Last 24 Hrs  T(C): 37.1 (05 Oct 2021 17:33), Max: 37.3 (05 Oct 2021 11:34)  T(F): 98.7 (05 Oct 2021 17:33), Max: 99.1 (05 Oct 2021 11:34)  HR: 110 (05 Oct 2021 18:38) (76 - 110)  BP: 156/86 (05 Oct 2021 18:38) (109/76 - 156/86)  BP(mean): --  RR: 18 (05 Oct 2021 18:38) (17 - 18)  SpO2: 94% (05 Oct 2021 18:38) (90% - 95%)    PHYSICAL EXAM:  GENERAL:   HEAD: Atraumatic, Normocephalic  EYES: PERRLA, conjunctiva and sclera clear  ENMT: No  exudates,; Moist mucous membranes,, No lesions  NECK: Supple, No JVD, Normal thyroid  NERVOUS SYSTEM:  Alert & Oriented,   CHEST/LUNG: Clear to auscultation bilaterally; No rales, rhonchi, wheezing, or rubs  HEART: Regular rate and rhythm; No murmurs, rubs, or gallops  ABDOMEN: Soft, Nontender, Nondistended; Bowel sounds present  EXTREMITIES:  2+ Peripheral Pulses, no edema  SKIN: No rashes or lesions    LABS:        CAPILLARY BLOOD GLUCOSE        Lipid panel:           Thyroid:  Diabetes Tests:  Parathyroid Panel:  Adrenals:  RADIOLOGY & ADDITIONAL TESTS:    Imaging Personally Reviewed:  [ ] YES  [ ] NO    Consultant(s) Notes Reviewed:  [ ] YES  [ ] NO    Care Discussed with Consultants/Other Providers [ ] YES  [ ] NO

## 2021-10-06 LAB
C PNEUM IGM TITR SER: SIGNIFICANT CHANGE UP
CHLAMYDIA AB SER-ACNC: SIGNIFICANT CHANGE UP
CHLAMYDIA IGG SER-ACNC: SIGNIFICANT CHANGE UP
CHLAMYDIA PNEUMONIAE IGA: SIGNIFICANT CHANGE UP
FLUAV AG NPH QL: SIGNIFICANT CHANGE UP
FLUBV AG NPH QL: SIGNIFICANT CHANGE UP
SARS-COV-2 RNA SPEC QL NAA+PROBE: SIGNIFICANT CHANGE UP

## 2021-10-06 PROCEDURE — 71045 X-RAY EXAM CHEST 1 VIEW: CPT | Mod: 26

## 2021-10-06 RX ADMIN — Medication 1 APPLICATION(S): at 17:25

## 2021-10-06 RX ADMIN — CEFTRIAXONE 100 MILLIGRAM(S): 500 INJECTION, POWDER, FOR SOLUTION INTRAMUSCULAR; INTRAVENOUS at 21:15

## 2021-10-06 RX ADMIN — AZITHROMYCIN 500 MILLIGRAM(S): 500 TABLET, FILM COATED ORAL at 12:27

## 2021-10-06 RX ADMIN — Medication 1 APPLICATION(S): at 05:46

## 2021-10-06 RX ADMIN — Medication 1 TABLET(S): at 05:46

## 2021-10-06 RX ADMIN — ENOXAPARIN SODIUM 40 MILLIGRAM(S): 100 INJECTION SUBCUTANEOUS at 12:26

## 2021-10-06 RX ADMIN — Medication 25 MILLIGRAM(S): at 05:46

## 2021-10-06 RX ADMIN — Medication 1 TABLET(S): at 17:04

## 2021-10-06 NOTE — PROGRESS NOTE ADULT - SUBJECTIVE AND OBJECTIVE BOX
INTERVAL HPI/OVERNIGHT EVENTS:        REVIEW OF SYSTEMS:  CONSTITUTIONAL:  no  complaints    NECK: No pain or stiffnes  RESPIRATORY: No SOB   CARDIOVASCULAR: No chest pain, palpitations, dizziness,   GASTROINTESTINAL: No abdominal pain. No nausea, vomiting,   NEUROLOGICAL: No headaches, no  blurry  vision no  dizziness  SKIN: No itching,   MUSCULOSKELETAL: No pain    MEDICATION:  azithromycin   Tablet 500 milliGRAM(s) Oral daily  cefTRIAXone   IVPB 1000 milliGRAM(s) IV Intermittent every 24 hours  enoxaparin Injectable 40 milliGRAM(s) SubCutaneous daily  lactobacillus acidophilus 1 Tablet(s) Oral two times a day  metoprolol tartrate 25 milliGRAM(s) Oral two times a day  mineral oil for Topical Use 1 Application(s) Topical two times a day    Vital Signs Last 24 Hrs  T(C): 36.7 (06 Oct 2021 05:16), Max: 37.3 (05 Oct 2021 11:34)  T(F): 98.1 (06 Oct 2021 05:16), Max: 99.1 (05 Oct 2021 11:34)  HR: 98 (06 Oct 2021 05:16) (88 - 110)  BP: 135/72 (06 Oct 2021 05:16) (109/76 - 156/86)  BP(mean): --  RR: 18 (06 Oct 2021 05:16) (17 - 18)  SpO2: 98% (06 Oct 2021 05:16) (93% - 98%)    PHYSICAL EXAM:  GENERAL: NAD, well-groomed, well-developed  HEENT : Conjuntivae  clear sclerae anicteric  NECK: Supple, No JVD, Normal thyroid  NERVOUS SYSTEM:  Alert oriented  x1   no  focal  deficits;   CHEST/LUNG:   ronchis  bases  HEART: Regular rate and rhythm; No murmurs, rubs, or gallops  ABDOMEN: Soft, Nontender, Nondistended; Bowel sounds present  EXTREMITIES:  no  edema no  tenderness  SKIN: No rashes   LABS:                        14.3   9.68  )-----------( 252      ( 05 Oct 2021 06:30 )             45.5     10-05    139  |  101  |  15  ----------------------------<  109<H>  4.0   |  33<H>  |  0.35<L>    Ca    9.6      05 Oct 2021 06:30          CAPILLARY BLOOD GLUCOSE          RADIOLOGY & ADDITIONAL TESTS:    Imaging reports  Personally Reviewed:  [ ] YES  [ ] NO    Consultant(s) Notes Reviewed:  [x ] YES  [ ] NO    Care Discussed with Consultants/Other Providers [ x] YES  [ ] NO  Problem/Plan - 1:  ·  Problem: Ambulatory dysfunction.   ·  Plan: -Patient p/w knee pain and inability to ambulate   -Topical NSAID       Problem/Plan - 2:  ·  Problem: Pneumonia.   ·  Plan:   Azithromycin  completed  riocephin clinically  improved   recheck  CXR  dsicharge  to  rehab  if  continued  improvment  -  Problem/Plan - 3:  ·  Problem: Chronic venous stasis dermatitis.   ·  Plan: -Patient noted to have chronic venous stasis  changes, no s/s of infection   -Keep legs elevated.    Problem/Plan - 4:  ·  Problem: Thyroid nodule.   endocrinology  evaluation      Problem/Plan - 5:  ·  Problem: HTN (hypertension).   ·  Plan: -Patient has hx of HTN metoprolol.    Problem/Plan - 6:  ·  Problem: Preventive measure.

## 2021-10-06 NOTE — PROGRESS NOTE ADULT - SUBJECTIVE AND OBJECTIVE BOX
TMAX - 99.1    On day # 7 Rocephin / #7 Zithromax po    Vital Signs Last 24 Hrs  T(C): 37.1 (06 Oct 2021 11:43), Max: 37.1 (05 Oct 2021 17:33)  T(F): 98.8 (06 Oct 2021 11:43), Max: 98.8 (06 Oct 2021 11:43)  HR: 90 (06 Oct 2021 11:43) (88 - 110)  BP: 117/68 (06 Oct 2021 11:43) (117/68 - 156/86)  BP(mean): --  RR: 18 (06 Oct 2021 11:43) (17 - 18)  SpO2: 92% (06 Oct 2021 11:43) (92% - 98%)  Supplemental O2:  on RA now    Awake, alert, no c/o cp or SOB now and reports the cough is intermittent and improving.  Appetite is fair.  No c/o N/V/D.  O2 Sat's remain between 92 - 95% and she is intermittently on NC O2.      PHYSICAL EXAM  General: 82 y/o white female awake, alert, lying  in bed now  in semi-upright position, presently on RA, very pleasant and cooperative today although she remains confused to events  HEENT:  conj pink, sclerae anicteric, PERRLA, no oral lesions noted, mucosa slightly dry  Neck: supple, no nodes noted  Heart: RR  Chest Wall : well-healed Lt Mastectomy site  Lungs:  rales bilaterally at the bases   Abdomen: BS+, semi-soft, nontender to palpation, well-healed RUQ scar, well-healed lower abdominal scar, no masses or HS-megaly detected  Back: no CVA or Spinal tenderness elicited to palpation  Extremities: trace edema both LE's with decreased warmth and fading darker  erythema from above the ankle to about 1/3 up the calf with some decreased dry scaling skin noted on the LE but very dry skin  still noted  on the   dorsum of both feet, nontender to palpation now                    well-healed scar of the Lt. Elbow - no  surrounding erythema or swelling noted  Skin: warm, dry, no rash      I&O's Summary :    05 Oct 2021 07:01  -  06 Oct 2021 07:00  --------------------------------------------------------  IN: 50 mL / OUT: 700 mL / NET: -650 mL      LABS:  CBC Full  -  ( 05 Oct 2021 06:30 )  WBC Count : 9.68 K/uL  RBC Count : 4.81 M/uL  Hemoglobin : 14.3 g/dL  Hematocrit : 45.5 %  Platelet Count - Automated : 252 K/uL  Mean Cell Volume : 94.6 fl  Mean Cell Hemoglobin : 29.7 pg  Mean Cell Hemoglobin Concentration : 31.4 gm/dL  Auto Neutrophil # : x  Auto Lymphocyte # : x  Auto Monocyte # : x  Auto Eosinophil # : x  Auto Basophil # : x  Auto Neutrophil % : x  Auto Lymphocyte % : x  Auto Monocyte % : x  Auto Eosinophil % : x  Auto Basophil % : x    10-05    139  |  101  |  15  ----------------------------<  109<H>  4.0   |  33<H>  |  0.35<L>    Ca    9.6      05 Oct 2021 06:30    Sedimentation Rate, Erythrocyte: 66 mm/hr (10-05 @ 06:30)  Sedimentation Rate, Erythrocyte: 62 mm/hr (10-02 @ 08:36)  Sedimentation Rate, Erythrocyte: 34 mm/hr (09-30 @ 08:09)    C-Reactive Protein, Serum (10.04.21 @ 10:26)    C-Reactive Protein, Serum: 46 mg/L    C-Reactive Protein, Serum (10.02.21 @ 11:51)    C-Reactive Protein, Serum: 84 mg/L    C-Reactive Protein, Serum (09.30.21 @ 12:58)    C-Reactive Protein, Serum: 90 mg/L    Procalcitonin, Serum (10.01.21 @ 11:05)    Procalcitonin, Serum: 0.06:     Procalcitonin, Serum (09.30.21 @ 15:28)    Procalcitonin, Serum: 0.11:     Lactate, Blood (09.29.21 @ 22:24)    Lactate, Blood: 1.4 mmol/L    Mycoplasma Pneumoniae IgM Antibody (09.30.21 @ 12:41)    Mycoplasma IgM AB: 0.38 Index    Mycoplasma: Negative: Method USAMA           Interpretation:                                             Index                  Negative              <=0.90                  Equivocal            0.91-1.09                  Positive                >=1.10        Triiodothyronine, Total (T3 Total) (10.02.21 @ 11:51)    Triiodothyronine, Total (T3 Total): 103 ng/dL    T4, Serum (10.02.21 @ 11:51)    T4, Serum: 7.4 ug/dL    Thyroid Stimulating Hormone, Serum (10.02.21 @ 08:36)    Thyroid Stimulating Hormone, Serum: 0.655 uIU/mL    Thyroid Stimulating Hormone, Serum (09.30.21 @ 08:09)    Thyroid Stimulating Hormone, Serum: 0.639 uIU/mL      MICROBIOLOGY:  Flu With COVID-19 By VONDA (10.06.21 @ 08:03)    SARS-CoV-2 Result: NotDetec: EUA/IVD    Influenza A Result: NotDetec: EUA/IVD    Influenza B Result: NotDetec: EUA/IVD    Specimen Source: .Blood Blood-Peripheral (10-04 @ 15:18)  Culture Results:   No growth to date. (10-04 @ 15:18)    Specimen Source: .Blood Blood-Peripheral (10-04 @ 15:18)  Culture Results:   No growth to date. (10-04 @ 15:18)    Specimen Source: .Blood Blood (09-30 @ 09:04)  Culture Results:   Growth in aerobic bottle: Staphylococcus simulans  Coag Negative Staphylococcus  Single set isolate, possible contaminant. Contact  Microbiology if susceptibility testing clinically  indicated.    Specimen Source: .Blood Blood (09-30 @ 09:04)  Culture Results:   No Growth Final (09-30 @ 09:04)    Specimen Source: Clean Catch Clean Catch (Midstream) (09-30 @ 08:57)  Culture Results:   <10,000 CFU/mL Normal Urogenital Nikki (09-30 @ 08:57)      Legionella Antigen, Urine: Negative (10-02 @ 11:26)    COVID-19 Shahab Domain Antibody (10.01.21 @ 10:46)    COVID-19 Shahab Domain Antibody Result: 0.40: Roche ECLIA Total AB (KIRSTIN)  NOTE: This result index represents a total antibody measurement, which  includes IgG, IgA and IgM.  Measures Receptor Binding Domain of the Shahab Protein  Negative <= 0.79 U/mL  Positive  >= 0.80 U/mL U/mL    COVID-19 Shahab Domain AB Interp: Negative: This test has been authorized for emergency use by the FDA     Flu With COVID-19 By VONDA (09.29.21 @ 16:52)    SARS-CoV-2 Result: NotDetec: EUA/IVD  This Respiratory Panel uses polymerase chain reaction (PCR) to detect for  influenza A; influenza B; and SARS-CoV-2.    Influenza A Result: NotDetec: EUA/IVD    Influenza B Result: NotDetec: EUA/IVD      Radiology:                  < from: US Duplex Venous Lower Ext Complete, Bilateral (10.01.21 @ 19:29) >  EXAM:  US DPLX LWR EXT VEINS COMPL BI                        PROCEDURE DATE:  10/01/2021    INTERPRETATION:  CLINICAL INFORMATION: 81 years  Female with r/o DVT. Bilateral lower extremity swelling, tenderness and erythema.  COMPARISON: None available.  TECHNIQUE: Duplex sonography of the BILATERAL LOWER extremity veins with color and spectral Doppler, with and without compression.  FINDINGS:  RIGHT:  Normal compressibility of the RIGHT common femoral, femoral and popliteal veins.  Doppler examination shows normal spontaneous and phasic flow.  No RIGHT calf vein thrombosis is detected.    LEFT:  Normal compressibility of the LEFT common femoral, femoral and popliteal veins.  Doppler examination shows normal spontaneous and phasic flow.  No LEFT calf vein thrombosis is detected.    IMPRESSION:  No evidence of deep venous thrombosis in either lower extremity.                 < from: US Thyroid (09.30.21 @ 08:42) >  EXAM:  US THYROID ONLY                        PROCEDURE DATE:  09/30/2021    INTERPRETATION:  Indication: Abnormal thyroid on CT of 9/29/2021.  Bilateral thyroid ultrasound.  Right thyroid lobe 6.2 x 3.4 x 3.4 cm. Left thyroid lobe 6.1 x 3.9 x 3.9 cm. Isthmus measures 0.54 cm AP. Multiple bilateral solid nodules, some with calcifications. The dominant nodule in the right lower pole measures 1.6 cm.    IMPRESSION: Enlarged multinodular thyroid as discussed. Consider biopsy of the dominant nodule for tissue specific diagnosis.            < from: CT Chest No Cont (09.29.21 @ 20:48) >  EXAM:  CT CHEST                        PROCEDURE DATE:  09/29/2021    INTERPRETATION:  CLINICAL INFORMATION: weakness, evaluate for pneumonia.  COMPARISON: Comparison to numerous prior examinations most recent on 9/29/2021  CONTRAST/COMPLICATIONS:  IV Contrast: None  Oral Contrast: None  Complications: None  PROCEDURE:  CT of the Chest was performed.  Sagittal and coronal reformats were performed.  FINDINGS:    LUNGS AND AIRWAYS: Patent central airways.  Multifocal consolidations in the right lower and left lower lobes. Subsegmental atelectasis versus scarring bilateral upper lobes and bilateral lower lobes.  PLEURA: No pleural effusion.  MEDIASTINUM AND ABNER: No lymphadenopathy.  VESSELS: Atherosclerotic calcifications ofthe aorta and coronary arteries.  HEART: Heart size is normal. No pericardial effusion.  CHEST WALL AND LOWER NECK: Status post left mastectomy and left axillary dissection. Enlarged left lobe of the thyroid with multiple hypoattenuating nodules and small calcifications.  VISUALIZED UPPER ABDOMEN: Moderate size hiatal hernia. Simple cyst in the left kidney measuring 2.4 cm.  BONES: Degenerative changes. Small sclerotic focus at the right glenoid (7, 6).    IMPRESSION:    Multifocal consolidations in the right and left lower lobes, correlate clinically for pneumonia.    Enlarged left lobe of the thyroid gland with multiple hypoattenuating nodules and small calcifications, recommend nonemergent thyroid ultrasound.    Small sclerotic focus in the right glenoid, recommend bone scan for further evaluation.                   < from: Xray Knee 3 Views, Bilateral (09.29.21 @ 16:41) >  EXAM:  XR KNEE 3 VIEWS BI                        PROCEDURE DATE:  09/29/2021    INTERPRETATION:  Bilateral knees  HISTORY:Difficult ambulation for two days   Three views of the right knee and three views of the left knee show no evidence of acute fracture nor destructive change. The joint spaces show mild arthritic changes of the medial compartments with calcified menisci bilaterally. Vascular calcifications are present. Small nodule of the lower right thigh may be in the skin asit is not visualized in the right lateral image.    IMPRESSION: No acute bony pathology.    Note - This does not exclude fractures in perfect alignment and apposition as presence may be indicated at one to three weeks following callus formation.    < from: Xray Chest 1 View- PORTABLE-Urgent (Xray Chest 1 View- PORTABLE-Urgent .) (09.29.21 @ 16:40) >  EXAM:  XR CHEST PORTABLE URGENT 1V                        PROCEDURE DATE:  09/29/2021    INTERPRETATION:  Chest one view  HISTORY: Weakness  COMPARISON STUDY: 5/5/2019  Frontal expiratory view of the chest shows the heart to be similar in size. The lungs show bilateral patchy infiltrates, right more than left and there is no evidence of pneumothorax nor pleural effusion.    IMPRESSION:  Patchy infiltrates.  Further information may be obtained from the patient's subsequent CT of the chest.      Impression:    82 y/o white female with hx of HTN, Lt. Breast Ca, s/p Lt. Mastectomy with Lymph Node Dissection,  s/p Cholecystectomy, s/p Fx/ Dislocation of the Lt Elbow requiring Lt. Elbow ORIF in 5/19 at [facility], recent Cellulitis of the LE's requiring ab rx about 1 month ago and subsequent rx with a diuretic for continued swelling of the LE's with ultimate improvement,  reported Short-term memory loss, chronic knee pain, admitted via the ER on 9/29/21 with c/o difficulty ambulating and w/u revealed a fever to 100.3, leukocytosis, and Multifocal Bilateral Lower Lobe Consolidations on CXR and CT of the Chest along with an enlarged/ multi-nodular Thyroid gland and a Sclerotic area in the Rt. Glenoid.  Patient was begun on Rocephin + Zithromax po and now with 1 blood Cx out of 2 growing Gm Positive Cocci in Clusters confirmed as Staph Simulans.  Sepsis presumably due to Multifocal Bilateral lower Lobe PNA and ? CNS  Bacteremia may represent a contaminant, however patient has underlying hardware in the Lt Elbow along with apparent ongoing/ recurrent Cellulitis of the LE's superimposed on underlying venous stasis changes.  Noted that venous Doppler of the LE's is Negative for DVT.  Noted COVID-19 Ab is negative ( consistent with hx that patient has NOT been vaccinated as yet ).  Thyroid profile is WNL and patient evaluated by Endocrinology re: enlarged Multinodular Thyroid with recommendation for FNA as outpatient.  Legionella Urine Ag is now Negative.  Repeat Blood Cx's from 10/4/21 are negative thus far.      Suggestions:   Will therefore continue current ab rx with Rocephin + Zithromax for a few more days and  await results of Chlamydia pneumoniae ab's.  Follow-up temps. labs. and O2 Sat's.  Repeat CXR done 10/5/21 AM and results are still pending - apparently another CXR was done this AM with results also pending.  When patient is clinically improved would recommend COVID-19 Vaccination  - discussed with patient again and she was amenable to this as well as Influenza Vaccination.

## 2021-10-06 NOTE — PROGRESS NOTE ADULT - SUBJECTIVE AND OBJECTIVE BOX
Patient is a 81y old  Female who presents with a chief complaint of pneumonia (05 Oct 2021 11:22)      Interval History: clinically stable , difficult to decipher thyroid status   thyroid function tests normal and no obstructive features     MEDICATIONS  (STANDING):  azithromycin   Tablet 500 milliGRAM(s) Oral daily  cefTRIAXone   IVPB 1000 milliGRAM(s) IV Intermittent every 24 hours  enoxaparin Injectable 40 milliGRAM(s) SubCutaneous daily  lactobacillus acidophilus 1 Tablet(s) Oral two times a day  metoprolol tartrate 25 milliGRAM(s) Oral two times a day  mineral oil for Topical Use 1 Application(s) Topical two times a day    MEDICATIONS  (PRN):      Allergies    No Known Allergies    Intolerances        REVIEW OF SYSTEMS:  CONSTITUTIONAL: no changes  EYES: No eye pain, visual disturbances, or discharge      Vital Signs Last 24 Hrs  T(C): 36.7 (06 Oct 2021 05:16), Max: 37.3 (05 Oct 2021 11:34)  T(F): 98.1 (06 Oct 2021 05:16), Max: 99.1 (05 Oct 2021 11:34)  HR: 98 (06 Oct 2021 05:16) (76 - 110)  BP: 135/72 (06 Oct 2021 05:16) (109/76 - 156/86)  BP(mean): --  RR: 18 (06 Oct 2021 05:16) (17 - 18)  SpO2: 98% (06 Oct 2021 05:16) (93% - 98%)    PHYSICAL EXAM:  GENERAL: more awake , obese   HEAD: Atraumatic, Normocephalic  EYES: PERRLA, conjunctiva and sclera clear  ENMT: No  exudates,; Moist mucous membranes,, No lesions  NECK: Supple, No JVD, Normal thyroid  NERVOUS SYSTEM:  Alert & Oriented,   CHEST/LUNG: Clear to auscultation bilaterally; No rales, rhonchi, wheezing, or rubs  HEART: Regular rate and rhythm; No murmurs, rubs, or gallops  ABDOMEN: Soft, Nontender, Nondistended; Bowel sounds present  EXTREMITIES:  2+ Peripheral Pulses, no edema  SKIN: No rashes or lesions    LABS:        CAPILLARY BLOOD GLUCOSE        Lipid panel:           Thyroid:  Diabetes Tests:  Parathyroid Panel:  Adrenals:  RADIOLOGY & ADDITIONAL TESTS:    Imaging Personally Reviewed:  [ ] YES  [ ] NO    Consultant(s) Notes Reviewed:  [ ] YES  [ ] NO    Care Discussed with Consultants/Other Providers [ ] YES  [ ] NO

## 2021-10-07 LAB
ANION GAP SERPL CALC-SCNC: 3 MMOL/L — LOW (ref 5–17)
BUN SERPL-MCNC: 14 MG/DL — SIGNIFICANT CHANGE UP (ref 7–23)
CALCIUM SERPL-MCNC: 9.1 MG/DL — SIGNIFICANT CHANGE UP (ref 8.5–10.1)
CHLORIDE SERPL-SCNC: 100 MMOL/L — SIGNIFICANT CHANGE UP (ref 96–108)
CO2 SERPL-SCNC: 34 MMOL/L — HIGH (ref 22–31)
CREAT SERPL-MCNC: 0.38 MG/DL — LOW (ref 0.5–1.3)
GLUCOSE SERPL-MCNC: 122 MG/DL — HIGH (ref 70–99)
HCT VFR BLD CALC: 45.7 % — HIGH (ref 34.5–45)
HGB BLD-MCNC: 14.6 G/DL — SIGNIFICANT CHANGE UP (ref 11.5–15.5)
MCHC RBC-ENTMCNC: 29.7 PG — SIGNIFICANT CHANGE UP (ref 27–34)
MCHC RBC-ENTMCNC: 31.9 GM/DL — LOW (ref 32–36)
MCV RBC AUTO: 92.9 FL — SIGNIFICANT CHANGE UP (ref 80–100)
NRBC # BLD: 0 /100 WBCS — SIGNIFICANT CHANGE UP (ref 0–0)
PLATELET # BLD AUTO: 265 K/UL — SIGNIFICANT CHANGE UP (ref 150–400)
POTASSIUM SERPL-MCNC: 3.9 MMOL/L — SIGNIFICANT CHANGE UP (ref 3.5–5.3)
POTASSIUM SERPL-SCNC: 3.9 MMOL/L — SIGNIFICANT CHANGE UP (ref 3.5–5.3)
RBC # BLD: 4.92 M/UL — SIGNIFICANT CHANGE UP (ref 3.8–5.2)
RBC # FLD: 13.1 % — SIGNIFICANT CHANGE UP (ref 10.3–14.5)
SODIUM SERPL-SCNC: 137 MMOL/L — SIGNIFICANT CHANGE UP (ref 135–145)
WBC # BLD: 9.22 K/UL — SIGNIFICANT CHANGE UP (ref 3.8–10.5)
WBC # FLD AUTO: 9.22 K/UL — SIGNIFICANT CHANGE UP (ref 3.8–10.5)

## 2021-10-07 RX ORDER — BNT162B2 0.23 MG/2.25ML
0.3 INJECTION, SUSPENSION INTRAMUSCULAR ONCE
Refills: 0 | Status: COMPLETED | OUTPATIENT
Start: 2021-10-07 | End: 2021-10-08

## 2021-10-07 RX ORDER — ACETAMINOPHEN 500 MG
650 TABLET ORAL EVERY 6 HOURS
Refills: 0 | Status: DISCONTINUED | OUTPATIENT
Start: 2021-10-07 | End: 2021-10-08

## 2021-10-07 RX ADMIN — Medication 25 MILLIGRAM(S): at 17:03

## 2021-10-07 RX ADMIN — ENOXAPARIN SODIUM 40 MILLIGRAM(S): 100 INJECTION SUBCUTANEOUS at 16:51

## 2021-10-07 RX ADMIN — CEFTRIAXONE 100 MILLIGRAM(S): 500 INJECTION, POWDER, FOR SOLUTION INTRAMUSCULAR; INTRAVENOUS at 21:17

## 2021-10-07 RX ADMIN — Medication 25 MILLIGRAM(S): at 05:34

## 2021-10-07 RX ADMIN — Medication 1 TABLET(S): at 17:03

## 2021-10-07 RX ADMIN — AZITHROMYCIN 500 MILLIGRAM(S): 500 TABLET, FILM COATED ORAL at 12:49

## 2021-10-07 RX ADMIN — Medication 1 TABLET(S): at 05:34

## 2021-10-07 RX ADMIN — Medication 1 APPLICATION(S): at 16:48

## 2021-10-07 RX ADMIN — Medication 1 APPLICATION(S): at 05:33

## 2021-10-07 NOTE — PROGRESS NOTE ADULT - SUBJECTIVE AND OBJECTIVE BOX
INTERVAL HPI:  81 year old female HTN, Short term memory loss and Chronic knee pain .  Presented  for weakness and knee pain.  She  reports feeling well and says that she was  brought to the hospital by people living in her house. She is a poor historian   Reported to be  treated  for b/l LE  cellulitis about a month ago, then started taking OTC water pills TID for the last one week, her swelling did improve.   However, for the last two days pt is unable to stand or walk without assistance, at baseline pt is able to ambulate on her own, family felt that her potassium level may be low due to the water pills she was taking.   IN  ED , CT chest showed: Multifocal consolidations in the right and left lower lobes.  Says she is a smoker.    OVERNIGHT EVENTS:  Feels better    Vital Signs Last 24 Hrs  T(C): 36.9 (07 Oct 2021 11:11), Max: 36.9 (06 Oct 2021 17:09)  T(F): 98.5 (07 Oct 2021 11:11), Max: 98.5 (07 Oct 2021 11:11)  HR: 90 (07 Oct 2021 11:11) (54 - 95)  BP: 134/84 (07 Oct 2021 11:11) (119/82 - 145/79)  BP(mean): --  RR: 18 (07 Oct 2021 11:11) (17 - 18)  SpO2: 93% (07 Oct 2021 11:11) (92% - 96%)    PHYSICAL EXAM:  GEN:         Awake, responsive and comfortable.  HEENT:    Normal.    RESP:       no wheezing.  CVS:          Regular rate and rhythm.   ABD:         Soft, non-tender, non-distended;     MEDICATIONS  (STANDING):  azithromycin   Tablet 500 milliGRAM(s) Oral daily  cefTRIAXone   IVPB 1000 milliGRAM(s) IV Intermittent every 24 hours  enoxaparin Injectable 40 milliGRAM(s) SubCutaneous daily  lactobacillus acidophilus 1 Tablet(s) Oral two times a day  metoprolol tartrate 25 milliGRAM(s) Oral two times a day  mineral oil for Topical Use 1 Application(s) Topical two times a day    LABS:                        14.6   9.22  )-----------( 265      ( 07 Oct 2021 06:27 )             45.7     10-07    137  |  100  |  14  ----------------------------<  122<H>  3.9   |  34<H>  |  0.38<L>    Ca    9.1      07 Oct 2021 06:27    ASSESSMENT AND PLAN:  ·	Multifocal pneumonia.  ·	Hypokalemia.  ·	HTN.  ·	Obesity.    Pulmonary improving.  Complete antibiotics per ID.

## 2021-10-07 NOTE — PROGRESS NOTE ADULT - SUBJECTIVE AND OBJECTIVE BOX
INTERVAL HPI/OVERNIGHT EVENTS:        REVIEW OF SYSTEMS:  CONSTITUTIONAL:  no  complaints    NECK: No pain or stiffnes  RESPIRATORY: No SOB   CARDIOVASCULAR: No chest pain, palpitations, dizziness,   GASTROINTESTINAL: No abdominal pain. No nausea, vomiting,   NEUROLOGICAL: No headaches, no  blurry  vision no  dizziness  SKIN: No itching,   MUSCULOSKELETAL: No pain    MEDICATION:  azithromycin   Tablet 500 milliGRAM(s) Oral daily  cefTRIAXone   IVPB 1000 milliGRAM(s) IV Intermittent every 24 hours  enoxaparin Injectable 40 milliGRAM(s) SubCutaneous daily  lactobacillus acidophilus 1 Tablet(s) Oral two times a day  metoprolol tartrate 25 milliGRAM(s) Oral two times a day  mineral oil for Topical Use 1 Application(s) Topical two times a day    Vital Signs Last 24 Hrs  T(C): 36.8 (07 Oct 2021 05:32), Max: 37.1 (06 Oct 2021 11:43)  T(F): 98.3 (07 Oct 2021 05:32), Max: 98.8 (06 Oct 2021 11:43)  HR: 95 (07 Oct 2021 05:32) (54 - 95)  BP: 145/79 (07 Oct 2021 05:32) (117/68 - 145/79)  BP(mean): --  RR: 18 (07 Oct 2021 05:32) (17 - 18)  SpO2: 94% (07 Oct 2021 05:32) (92% - 96%)    PHYSICAL EXAM:  GENERAL: NAD, well-groomed, well-developed  HEENT : Conjuntivae  clear sclerae anicteric  NECK: Supple, No JVD, Normal thyroid  NERVOUS SYSTEM:  Alert oriented   no  focal  deficits;   CHEST/LUNG: Clear    HEART: Regular rate and rhythm; No murmurs, rubs, or gallops  ABDOMEN: Soft, Nontender, Nondistended; Bowel sounds present  EXTREMITIES:  no  edema no  tenderness  SKIN: No rashes   LABS:                        14.6   9.22  )-----------( 265      ( 07 Oct 2021 06:27 )             45.7     10-07    137  |  100  |  14  ----------------------------<  122<H>  3.9   |  34<H>  |  0.38<L>    Ca    9.1      07 Oct 2021 06:27          CAPILLARY BLOOD GLUCOSE          RADIOLOGY & ADDITIONAL TESTS:    Imaging reports  Personally Reviewed:  [x ] YES  [ ] NO    Consultant(s) Notes Reviewed:  [x] YES  [ ] NO  Problem/Plan - 1:  ·  Problem: Ambulatory dysfunction.   ·  Plan: -Patient p/w knee pain and inability to ambulate   -Topical NSAID       Problem/Plan - 2:  ·  Problem: Pneumonia.   ·  Plan:   Azithromycin   riocephin OVER  ONE  WEEK clinically  improved   recheck  CXR  dsicharge  to  rehab  if  continued  improvment  REPEAT  CXR  DONE  APPEARS  IMPROVED  NO  OFFICIAL  READING YET  DISCHARGE  TO  REHAB  ON  AUGMENTIN  -  Problem/Plan - 3:  ·  Problem: Chronic venous stasis dermatitis.   ·  Plan: -Patient noted to have chronic venous stasis  changes, no s/s of infection   -Keep legs elevated.    Problem/Plan - 4:  ·  Problem: Thyroid nodule.   endocrinology  evaluation      Problem/Plan - 5:  ·  Problem: HTN (hypertension).   ·  Plan: -Patient has hx of HTN metoprolol.    Problem/Plan - 6:  ·  Problem: Preventive measure.     Care Discussed with Consultants/Other Providers [ ] YES  [ ] NO

## 2021-10-07 NOTE — PROGRESS NOTE ADULT - SUBJECTIVE AND OBJECTIVE BOX
TMAX - 98.8    On day # 8 Rocephin / #8 Zithromax    Vital Signs Last 24 Hrs  T(C): 36.9 (07 Oct 2021 11:11), Max: 36.9 (06 Oct 2021 17:09)  T(F): 98.5 (07 Oct 2021 11:11), Max: 98.5 (07 Oct 2021 11:11)  HR: 90 (07 Oct 2021 11:11) (54 - 95)  BP: 134/84 (07 Oct 2021 11:11) (119/82 - 145/79)  BP(mean): --  RR: 18 (07 Oct 2021 11:11) (17 - 18)  SpO2: 93% (07 Oct 2021 11:11) (92% - 96%)  Supplemental O2: on RA now     Awake, alert, no c/o SOB or cp and reports that her cough is decreased now.  No c/o N/V/D but c/o intermittent lower abdominal discomfort - she is unsure if she needs to void or have a BM .  Explained to patient that she has an external female catheter in place and has been voiding but that she can be placed on a bedpan to try to have a BM.  O2 Sat's between 92 - 98% on RA.      PHYSICAL EXAM  General: 80 y/o white female awake, alert, lying  in bed now  in semi-upright position, presently on RA, very pleasant and cooperative but c/o some lower abdominal discomfort intermittently  HEENT:  conj pink, sclerae anicteric, PERRLA, no oral lesions noted, mucosa slightly dry  Neck: supple, no nodes noted  Heart: RR  Chest Wall : well-healed Lt Mastectomy site  Lungs:  few rales bilaterally at the bases   Abdomen: BS+, semi-soft, nontender to palpation, well-healed RUQ scar, well-healed lower abdominal scar, no masses or HS-megaly detected  Back: no CVA or Spinal tenderness elicited to palpation  Extremities: trace edema both LE's with decreased warmth and fading darker  erythema from above the ankle to about 1/3 up the calf with some decreased dry scaling skin noted on the LE but very dry skin  still noted  on the   dorsum of both feet, nontender to palpation now                    well-healed scar of the Lt. Elbow - no  surrounding erythema or swelling noted  Skin: warm, dry, no rash  Primafit External female catheter in place and noted with some pink-tinged urine output in the canister now      I&O's Summary :    06 Oct 2021 07:01  -  07 Oct 2021 07:00  --------------------------------------------------------  IN: 0 mL / OUT: 800 mL / NET: -800 mL      LABS:  CBC Full  -  ( 07 Oct 2021 06:27 )  WBC Count : 9.22 K/uL  RBC Count : 4.92 M/uL  Hemoglobin : 14.6 g/dL  Hematocrit : 45.7 %  Platelet Count - Automated : 265 K/uL  Mean Cell Volume : 92.9 fl  Mean Cell Hemoglobin : 29.7 pg  Mean Cell Hemoglobin Concentration : 31.9 gm/dL  Auto Neutrophil # : x  Auto Lymphocyte # : x  Auto Monocyte # : x  Auto Eosinophil # : x  Auto Basophil # : x  Auto Neutrophil % : x  Auto Lymphocyte % : x  Auto Monocyte % : x  Auto Eosinophil % : x  Auto Basophil % : x    10-07    137  |  100  |  14  ----------------------------<  122<H>  3.9   |  34<H>  |  0.38<L>    Ca    9.1      07 Oct 2021 06:27    Sedimentation Rate, Erythrocyte: 66 mm/hr (10-05 @ 06:30)  Sedimentation Rate, Erythrocyte: 62 mm/hr (10-02 @ 08:36)  Sedimentation Rate, Erythrocyte: 34 mm/hr (09-30 @ 08:09)    C-Reactive Protein, Serum (10.04.21 @ 10:26)    C-Reactive Protein, Serum: 46 mg/L    C-Reactive Protein, Serum (10.02.21 @ 11:51)    C-Reactive Protein, Serum: 84 mg/L    C-Reactive Protein, Serum (09.30.21 @ 12:58)    C-Reactive Protein, Serum: 90 mg/L    Procalcitonin, Serum (10.01.21 @ 11:05)    Procalcitonin, Serum: 0.06:     Procalcitonin, Serum (09.30.21 @ 15:28)    Procalcitonin, Serum: 0.11:     Lactate, Blood (09.29.21 @ 22:24)    Lactate, Blood: 1.4 mmol/L    Mycoplasma Pneumoniae IgM Antibody (09.30.21 @ 12:41)    Mycoplasma IgM AB: 0.38 Index    Mycoplasma: Negative: Method USAMA           Interpretation:                                             Index                  Negative              <=0.90                  Equivocal            0.91-1.09                  Positive                >=1.10        Triiodothyronine, Total (T3 Total) (10.02.21 @ 11:51)    Triiodothyronine, Total (T3 Total): 103 ng/dL    T4, Serum (10.02.21 @ 11:51)    T4, Serum: 7.4 ug/dL    Thyroid Stimulating Hormone, Serum (10.02.21 @ 08:36)    Thyroid Stimulating Hormone, Serum: 0.655 uIU/mL    Thyroid Stimulating Hormone, Serum (09.30.21 @ 08:09)    Thyroid Stimulating Hormone, Serum: 0.639 uIU/mL      MICROBIOLOGY:  Specimen Source: .Blood Blood-Peripheral (10-04 @ 15:18)  Culture Results:   No growth to date. (10-04 @ 15:18)    Specimen Source: .Blood Blood-Peripheral (10-04 @ 15:18)  Culture Results:   No growth to date. (10-04 @ 15:18)    Flu With COVID-19 By VONDA (10.06.21 @ 08:03)    SARS-CoV-2 Result: NotDetec: EUA/IVD    Influenza A Result: NotDetec: EUA/IVD    Influenza B Result: NotDetec: EUA/IVD    Chlamydia pneumoniae IgG: <1:64 (10-02 @ 11:18)  Chlamydia pneumoniae IgA: <1:16 (10-02 @ 11:18)  Chlamydia pneumoniae IgM: <1:10 (10-02 @ 11:18)      Specimen Source: .Blood Blood (09-30 @ 09:04)  Culture Results:   Growth in aerobic bottle: Staphylococcus simulans  Coag Negative Staphylococcus  Single set isolate, possible contaminant. Contact  Microbiology if susceptibility testing clinically  indicated.    Specimen Source: .Blood Blood (09-30 @ 09:04)  Culture Results:   No Growth Final (09-30 @ 09:04)    Specimen Source: Clean Catch Clean Catch (Midstream) (09-30 @ 08:57)  Culture Results:   <10,000 CFU/mL Normal Urogenital Nikki (09-30 @ 08:57)      Legionella Antigen, Urine: Negative (10-02 @ 11:26)    COVID-19 Shahab Domain Antibody (10.01.21 @ 10:46)    COVID-19 Shahab Domain Antibody Result: 0.40: Roche ECLIA Total AB (KIRSTIN)  NOTE: This result index represents a total antibody measurement, which  includes IgG, IgA and IgM.  Measures Receptor Binding Domain of the Shahab Protein  Negative <= 0.79 U/mL  Positive  >= 0.80 U/mL U/mL    COVID-19 Shahab Domain AB Interp: Negative: This test has been authorized for emergency use by the FDA     Flu With COVID-19 By VONDA (09.29.21 @ 16:52)    SARS-CoV-2 Result: NotDetec: EUA/IVD  This Respiratory Panel uses polymerase chain reaction (PCR) to detect for  influenza A; influenza B; and SARS-CoV-2.    Influenza A Result: NotDetec: EUA/IVD    Influenza B Result: NotDetec: EUA/IVD      Radiology:         < from: Xray Chest 1 View- PORTABLE-Urgent (Xray Chest 1 View- PORTABLE-Urgent .) (10.06.21 @ 11:22) >  EXAM:  XR CHEST PORTABLE URGENT 1V                        EXAM:  XR CHEST PORTABLE ROUTINE 1V                        PROCEDURE DATE:  10/05/2021    INTERPRETATION:  Portable chest radiograph  CLINICAL INFORMATION: Cough  TECHNIQUE:Portable  AP view of the chest.  COMPARISON: 9/29/2021 chest available for review.  FINDINGS:  The lungs show decreasing bilateral  diffuse airspace disease. Residual small RIGHT lung base opacity.  . No pneumothorax.  The heart ad mediastinum size and configuration are within normal limits.  Visualized osseous structures are intact.  IMPRESSION:   Decreasing bilateral diffuse airspace disease with residual small RIGHT lung base opacity..                           < from: Xray Chest 1 View- PORTABLE-Routine (Xray Chest 1 View- PORTABLE-Routine in AM.) (10.05.21 @ 08:13) >  EXAM:  XR CHEST PORTABLE URGENT 1V                        EXAM:  XR CHEST PORTABLE ROUTINE 1V                        PROCEDURE DATE:  10/05/2021    INTERPRETATION:  Portable chest radiograph  CLINICAL INFORMATION: Cough  TECHNIQUE:Portable  AP view of the chest.  COMPARISON: 9/29/2021 chest available for review.  FINDINGS:  The lungs show decreasing bilateral  diffuse airspace disease. Residual small RIGHT lung base opacity.  . No pneumothorax.  The heart and mediastinum size and configuration are within normal limits.  Visualized osseous structures are intact.  IMPRESSION:   Decreasing bilateral diffuse airspace disease with residual small RIGHT lung base opacity..                           < from: US Duplex Venous Lower Ext Complete, Bilateral (10.01.21 @ 19:29) >  EXAM:  US DPLX LWR EXT VEINS COMPL BI                        PROCEDURE DATE:  10/01/2021    INTERPRETATION:  CLINICAL INFORMATION: 81 years  Female with r/o DVT. Bilateral lower extremity swelling, tenderness and erythema.  COMPARISON: None available.  TECHNIQUE: Duplex sonography of the BILATERAL LOWER extremity veins with color and spectral Doppler, with and without compression.  FINDINGS:  RIGHT:  Normal compressibility of the RIGHT common femoral, femoral and popliteal veins.  Doppler examination shows normal spontaneous and phasic flow.  No RIGHT calf vein thrombosis is detected.    LEFT:  Normal compressibility of the LEFT common femoral, femoral and popliteal veins.  Doppler examination shows normal spontaneous and phasic flow.  No LEFT calf vein thrombosis is detected.    IMPRESSION:  No evidence of deep venous thrombosis in either lower extremity.                 < from: US Thyroid (09.30.21 @ 08:42) >  EXAM:  US THYROID ONLY                        PROCEDURE DATE:  09/30/2021    INTERPRETATION:  Indication: Abnormal thyroid on CT of 9/29/2021.  Bilateral thyroid ultrasound.  Right thyroid lobe 6.2 x 3.4 x 3.4 cm. Left thyroid lobe 6.1 x 3.9 x 3.9 cm. Isthmus measures 0.54 cm AP. Multiple bilateral solid nodules, some with calcifications. The dominant nodule in the right lower pole measures 1.6 cm.    IMPRESSION: Enlarged multinodular thyroid as discussed. Consider biopsy of the dominant nodule for tissue specific diagnosis.            < from: CT Chest No Cont (09.29.21 @ 20:48) >  EXAM:  CT CHEST                        PROCEDURE DATE:  09/29/2021    INTERPRETATION:  CLINICAL INFORMATION: weakness, evaluate for pneumonia.  COMPARISON: Comparison to numerous prior examinations most recent on 9/29/2021  CONTRAST/COMPLICATIONS:  IV Contrast: None  Oral Contrast: None  Complications: None  PROCEDURE:  CT of the Chest was performed.  Sagittal and coronal reformats were performed.  FINDINGS:    LUNGS AND AIRWAYS: Patent central airways.  Multifocal consolidations in the right lower and left lower lobes. Subsegmental atelectasis versus scarring bilateral upper lobes and bilateral lower lobes.  PLEURA: No pleural effusion.  MEDIASTINUM AND ABNER: No lymphadenopathy.  VESSELS: Atherosclerotic calcifications ofthe aorta and coronary arteries.  HEART: Heart size is normal. No pericardial effusion.  CHEST WALL AND LOWER NECK: Status post left mastectomy and left axillary dissection. Enlarged left lobe of the thyroid with multiple hypoattenuating nodules and small calcifications.  VISUALIZED UPPER ABDOMEN: Moderate size hiatal hernia. Simple cyst in the left kidney measuring 2.4 cm.  BONES: Degenerative changes. Small sclerotic focus at the right glenoid (7, 6).    IMPRESSION:  Multifocal consolidations in the right and left lower lobes, correlate clinically for pneumonia.  Enlarged left lobe of the thyroid gland with multiple hypoattenuating nodules and small calcifications, recommend nonemergent thyroid ultrasound.  Small sclerotic focus in the right glenoid, recommend bone scan for further evaluation.                   < from: Xray Knee 3 Views, Bilateral (09.29.21 @ 16:41) >  EXAM:  XR KNEE 3 VIEWS BI                        PROCEDURE DATE:  09/29/2021    INTERPRETATION:  Bilateral knees  HISTORY:Difficult ambulation for two days   Three views of the right knee and three views of the left knee show no evidence of acute fracture nor destructive change. The joint spaces show mild arthritic changes of the medial compartments with calcified menisci bilaterally. Vascular calcifications are present. Small nodule of the lower right thigh may be in the skin asit is not visualized in the right lateral image.  IMPRESSION: No acute bony pathology.  Note - This does not exclude fractures in perfect alignment and apposition as presence may be indicated at one to three weeks following callus formation.    < from: Xray Chest 1 View- PORTABLE-Urgent (Xray Chest 1 View- PORTABLE-Urgent .) (09.29.21 @ 16:40) >  EXAM:  XR CHEST PORTABLE URGENT 1V                        PROCEDURE DATE:  09/29/2021    INTERPRETATION:  Chest one view  HISTORY: Weakness  COMPARISON STUDY: 5/5/2019  Frontal expiratory view of the chest shows the heart to be similar in size. The lungs show bilateral patchy infiltrates, right more than left and there is no evidence of pneumothorax nor pleural effusion.    IMPRESSION:  Patchy infiltrates.  Further information may be obtained from the patient's subsequent CT of the chest.        Impression:    80 y/o white female with hx of HTN, Lt. Breast Ca, s/p Lt. Mastectomy with Lymph Node Dissection,  s/p Cholecystectomy, s/p Fx/ Dislocation of the Lt Elbow requiring Lt. Elbow ORIF in 5/19 at [facility], recent Cellulitis of the LE's requiring ab rx about 1 month ago and subsequent rx with a diuretic for continued swelling of the LE's with ultimate improvement,  reported Short-term memory loss, chronic knee pain, admitted via the ER on 9/29/21 with c/o difficulty ambulating and w/u revealed a fever to 100.3, leukocytosis, and Multifocal Bilateral Lower Lobe Consolidations on CXR and CT of the Chest along with an enlarged/ multi-nodular Thyroid gland and a Sclerotic area in the Rt. Glenoid.  Patient was begun on Rocephin + Zithromax po and now with 1 blood Cx out of 2 growing Gm Positive Cocci in Clusters confirmed as Staph Simulans.  Sepsis presumably due to Multifocal Bilateral lower Lobe PNA and ? CNS  Bacteremia may represent a contaminant, however patient has underlying hardware in the Lt Elbow along with apparent ongoing/ recurrent Cellulitis of the LE's superimposed on underlying venous stasis changes.  Noted that venous Doppler of the LE's is Negative for DVT.  Noted COVID-19 Ab is negative ( consistent with hx that patient has NOT been vaccinated as yet ).  Thyroid profile is WNL and patient evaluated by Endocrinology re: enlarged Multinodular Thyroid with recommendation for FNA as outpatient.  Legionella Urine Ag is now Negative.  Repeat Blood Cx's from 10/4/21 remain negative thus far and Chlamydia pneumoniae Ab is now Negative.  WBC's remain WNL and repeat CXR's  done 10/5/21 and again 10/6/21 are reported with decrease of the bilateral infiltrates with residual  small Rt. Base opacity.    Suggestions:   Will therefore continue current ab rx with Rocephin for 1-2 more days and d/c Zithromax now.  Follow-up temps. labs. and O2 Sat's.  As patient is clinically improved would recommend COVID-19 Vaccination  - discussed with patient again and with her daughter-in-law Shayy Mathew via phone ( [phone]) and  she was amenable to this as well as recommendation for subsequent Influenza Vaccination at a later date.

## 2021-10-07 NOTE — DIETITIAN INITIAL EVALUATION ADULT. - OTHER INFO
Pt adm w/weakness & knee pain; per chart pt w/pneumonia. Per chart pt is confused & [place]. Met w/pt at bedside, pt appears slightly confused & forgetful, able to answer some questions. Pt reports eating "okay", unable to recall previous meal. Pt states she eats what she likes. Pt denies N/V/D/C or difficulty chewing/swallowing. Per chart PTA pt lived alone w/Son & Dtr-in-law living 2 blocks away. Pt also has an Aide 4.5 hours x 7 days. Pt states Son & dtr-in-law do the food shopping & aide cooks her meals. Pt unable to state if she is on any specific diets. Pt unable to state UBW or knowledge of any weight changes. Per chart pt has goiter, Endocrinology is following. Pt refused nutrition-focused physical exam, pushed this RD's hand away. Diet education on Low Sodium diet along w/handout provided. Encouraged pt to maintain good PO intake, as able. Pt made aware RD remains available.

## 2021-10-07 NOTE — DIETITIAN INITIAL EVALUATION ADULT. - PERTINENT LABORATORY DATA
10-07 Na137 mmol/L Glu 122 mg/dL<H> K+ 3.9 mmol/L Cr  0.38 mg/dL<L> BUN 14 mg/dL 10-04 Alb 2.1 g/dL<L>

## 2021-10-07 NOTE — PROGRESS NOTE ADULT - SUBJECTIVE AND OBJECTIVE BOX
Patient is a 81y old  Female who presents with a chief complaint of pneumonia (07 Oct 2021 10:33)      Interval History: no dysphagia   euthyroid     MEDICATIONS  (STANDING):  azithromycin   Tablet 500 milliGRAM(s) Oral daily  cefTRIAXone   IVPB 1000 milliGRAM(s) IV Intermittent every 24 hours  enoxaparin Injectable 40 milliGRAM(s) SubCutaneous daily  lactobacillus acidophilus 1 Tablet(s) Oral two times a day  metoprolol tartrate 25 milliGRAM(s) Oral two times a day  mineral oil for Topical Use 1 Application(s) Topical two times a day    MEDICATIONS  (PRN):      Allergies    No Known Allergies    Intolerances        REVIEW OF SYSTEMS:  CONSTITUTIONAL: no changes  EYES: No eye pain, visual disturbances, or discharge  ENMT:  No difficulty hearing, No sinus or throat pain  NECK: No pain or stiffness  RESPIRATORY: No cough, wheezing, chills or hemoptysis; No shortness of breath  CARDIOVASCULAR: No chest pain, palpitations or leg swelling  GASTROINTESTINAL: No abdominal or epigastric pain. No nausea, vomiting, or hematemesis; No diarrhea or constipation. No melena or hematochezia.  GENITOURINARY: No dysuria, frequency, hematuria, or incontinence  NEUROLOGICAL: No headaches, memory loss, loss of strength, numbness, or tremors  SKIN: No itching, burning, rashes, or lesions   ENDOCRINE: No heat or cold intolerance; No hair loss  MUSCULOSKELETAL: No joint pain or swelling; No muscle, back, or extremity pain  PSYCHIATRIC: No depression, anxiety, mood swings, or difficulty sleeping  HEME/LYMPH: No easy bruising, or bleeding gums  ALLERY AND IMMUNOLOGIC: No hives or eczema    Vital Signs Last 24 Hrs  T(C): 36.9 (07 Oct 2021 11:11), Max: 36.9 (06 Oct 2021 17:09)  T(F): 98.5 (07 Oct 2021 11:11), Max: 98.5 (07 Oct 2021 11:11)  HR: 90 (07 Oct 2021 11:11) (54 - 95)  BP: 134/84 (07 Oct 2021 11:11) (119/82 - 145/79)  BP(mean): --  RR: 18 (07 Oct 2021 11:11) (17 - 18)  SpO2: 93% (07 Oct 2021 11:11) (92% - 96%)    PHYSICAL EXAM:  GENERAL:   HEAD: Atraumatic, Normocephalic  EYES: PERRLA, conjunctiva and sclera clear  ENMT: No  exudates,; Moist mucous membranes,, No lesions  NECK: Supple, No JVD, Normal thyroid  NERVOUS SYSTEM:  Alert & Oriented,   CHEST/LUNG: Clear to auscultation bilaterally; No rales, rhonchi, wheezing, or rubs  HEART: Regular rate and rhythm; No murmurs, rubs, or gallops  ABDOMEN: Soft, Nontender, Nondistended; Bowel sounds present  EXTREMITIES:  2+ Peripheral Pulses, no edema  SKIN: No rashes or lesions    LABS:        CAPILLARY BLOOD GLUCOSE        Lipid panel:           Thyroid:  Diabetes Tests:  Parathyroid Panel:  Adrenals:  RADIOLOGY & ADDITIONAL TESTS:    Imaging Personally Reviewed:  [ ] YES  [ ] NO    Consultant(s) Notes Reviewed:  [ ] YES  [ ] NO    Care Discussed with Consultants/Other Providers [ ] YES  [ ] NO

## 2021-10-07 NOTE — DIETITIAN INITIAL EVALUATION ADULT. - PERTINENT MEDS FT
MEDICATIONS  (STANDING):  azithromycin   Tablet 500 milliGRAM(s) Oral daily  cefTRIAXone   IVPB 1000 milliGRAM(s) IV Intermittent every 24 hours  enoxaparin Injectable 40 milliGRAM(s) SubCutaneous daily  lactobacillus acidophilus 1 Tablet(s) Oral two times a day  metoprolol tartrate 25 milliGRAM(s) Oral two times a day  mineral oil for Topical Use 1 Application(s) Topical two times a day    MEDICATIONS  (PRN):

## 2021-10-08 ENCOUNTER — TRANSCRIPTION ENCOUNTER (OUTPATIENT)
Age: 81
End: 2021-10-08

## 2021-10-08 VITALS
DIASTOLIC BLOOD PRESSURE: 87 MMHG | HEART RATE: 111 BPM | TEMPERATURE: 98 F | SYSTOLIC BLOOD PRESSURE: 151 MMHG | RESPIRATION RATE: 18 BRPM | OXYGEN SATURATION: 91 %

## 2021-10-08 LAB — GLUCOSE BLDC GLUCOMTR-MCNC: 141 MG/DL — HIGH (ref 70–99)

## 2021-10-08 RX ORDER — ACETAMINOPHEN 500 MG
2 TABLET ORAL
Qty: 0 | Refills: 0 | DISCHARGE
Start: 2021-10-08

## 2021-10-08 RX ORDER — METOPROLOL TARTRATE 50 MG
1 TABLET ORAL
Qty: 0 | Refills: 0 | DISCHARGE
Start: 2021-10-08

## 2021-10-08 RX ORDER — ENOXAPARIN SODIUM 100 MG/ML
40 INJECTION SUBCUTANEOUS
Qty: 0 | Refills: 0 | DISCHARGE
Start: 2021-10-08

## 2021-10-08 RX ORDER — LACTOBACILLUS ACIDOPHILUS 100MM CELL
1 CAPSULE ORAL
Qty: 0 | Refills: 0 | DISCHARGE
Start: 2021-10-08

## 2021-10-08 RX ADMIN — ENOXAPARIN SODIUM 40 MILLIGRAM(S): 100 INJECTION SUBCUTANEOUS at 12:08

## 2021-10-08 RX ADMIN — Medication 25 MILLIGRAM(S): at 06:15

## 2021-10-08 RX ADMIN — Medication 1 TABLET(S): at 06:15

## 2021-10-08 RX ADMIN — Medication 1 APPLICATION(S): at 06:15

## 2021-10-08 RX ADMIN — BNT162B2 0.3 MILLILITER(S): 0.23 INJECTION, SUSPENSION INTRAMUSCULAR at 16:24

## 2021-10-08 NOTE — DISCHARGE NOTE NURSING/CASE MANAGEMENT/SOCIAL WORK - NSDCPELOVENOXFU_GEN_ALL_CORE
FMLA or Disability Forms were received and faxed to the Forms Completion Department today at [phone]. (Please include all appropriate authorization forms with your fax). Did you have the patient complete (in full) and sign the âAuthorization For Disclosure of Health Information Forms Completionâ form? No, Reason: Via Fax    Have you communicated that the Forms Completion Process may take up to 14 days? Yes    If you have questions about this encounter, please contact the Forms Completion Dept at [phone], Option 3.     Forms faxed to forms completion  Received confirmation Go for blood tests as directed. Your doctor will do lab tests at regular visits to monitor the effects of this medicine. Please follow up with your doctor and keep your health care provider appointments

## 2021-10-08 NOTE — PROGRESS NOTE ADULT - PROVIDER SPECIALTY LIST ADULT
Infectious Disease
Internal Medicine
Pulmonology
Infectious Disease
Internal Medicine
Pulmonology
Endocrinology
Endocrinology
Internal Medicine
Internal Medicine
Endocrinology
Endocrinology

## 2021-10-08 NOTE — PROGRESS NOTE ADULT - SUBJECTIVE AND OBJECTIVE BOX
INTERVAL HPI/OVERNIGHT EVENTS:        REVIEW OF SYSTEMS:  CONSTITUTIONAL:  no  complaints    NECK: No pain or stiffnes  RESPIRATORY: No SOB   CARDIOVASCULAR: No chest pain, palpitations, dizziness,   GASTROINTESTINAL: No abdominal pain. No nausea, vomiting,   NEUROLOGICAL: No headaches, no  blurry  vision no  dizziness  SKIN: No itching,   MUSCULOSKELETAL: No pain    MEDICATION:  acetaminophen   Tablet .. 650 milliGRAM(s) Oral every 6 hours PRN  cefTRIAXone   IVPB 1000 milliGRAM(s) IV Intermittent every 24 hours  coronavirus Vaccine (PFIZER) 0.3 milliLiter(s) IntraMuscular once  enoxaparin Injectable 40 milliGRAM(s) SubCutaneous daily  lactobacillus acidophilus 1 Tablet(s) Oral two times a day  metoprolol tartrate 25 milliGRAM(s) Oral two times a day  mineral oil for Topical Use 1 Application(s) Topical two times a day    Vital Signs Last 24 Hrs  T(C): 36.9 (08 Oct 2021 06:11), Max: 37 (07 Oct 2021 23:06)  T(F): 98.4 (08 Oct 2021 06:11), Max: 98.6 (07 Oct 2021 23:06)  HR: 99 (08 Oct 2021 06:11) (74 - 99)  BP: 149/84 (08 Oct 2021 06:11) (124/76 - 149/84)  BP(mean): --  RR: 18 (08 Oct 2021 06:11) (18 - 18)  SpO2: 94% (08 Oct 2021 06:11) (92% - 95%)    PHYSICAL EXAM:  GENERAL: NAD, well-groomed, well-developed  HEENT : Conjuntivae  clear sclerae anicteric  NECK: Supple, No JVD, Normal thyroid  NERVOUS SYSTEM:  Alert oriented x1  no  focal  deficits;   CHEST/LUNG: Clear    HEART: Regular rate and rhythm; No murmurs, rubs, or gallops  ABDOMEN: Soft, Nontender, Nondistended; Bowel sounds present  EXTREMITIES:  no  edema no  tenderness  SKIN: No rashes   LABS:                        14.6   9.22  )-----------( 265      ( 07 Oct 2021 06:27 )             45.7     10-07    137  |  100  |  14  ----------------------------<  122<H>  3.9   |  34<H>  |  0.38<L>    Ca    9.1      07 Oct 2021 06:27          CAPILLARY BLOOD GLUCOSE      POCT Blood Glucose.: 141 mg/dL (08 Oct 2021 07:25)      RADIOLOGY & ADDITIONAL TESTS:    Imaging reports  Personally Reviewed:  [x ] YES  [ ] NO    Consultant(s) Notes Reviewed:  [ x] YES  [ ] NO    Care Discussed with Consultants/Other Providers [ x] YES  [ ] NO  Problem/Plan - 1:  ·  Problem: Ambulatory dysfunction.   ·  Plan: -Patient p/w knee pain and inability to ambulate   -Topical NSAID       Problem/Plan - 2:  ·  Problem: Pneumonia.   ·  Plan:   Azithromycin   riocephin OVER  ONE  WEEK clinically  improved   recheck  CXR  dsicharge  to  rehab  if  continued  improvment  REPEAT  CXR  DONE  APPEARS  IMPROVED  NO  OFFICIAL  READING YET  DISCHARGE  TO  REHAB  ON  AUGMENTIN  -  Problem/Plan - 3:  ·  Problem: Chronic venous stasis dermatitis.   ·  Plan: -Patient noted to have chronic venous stasis  changes, no s/s of infection   -Keep legs elevated.    Problem/Plan - 4:  ·  Problem: Thyroid nodule.   endocrinology  evaluation      Problem/Plan - 5:  ·  Problem: HTN (hypertension).   ·  Plan: -Patient has hx of HTN metoprolol.    Problem/Plan - 6:  ·  Problem: Preventive measure.

## 2021-10-08 NOTE — DISCHARGE NOTE NURSING/CASE MANAGEMENT/SOCIAL WORK - NSDCVIVACCINE_GEN_ALL_CORE_FT
Tdap; 03-May-2019 11:04; Kathie Christianson (WOODY); [employer]; [id number] (Exp. Date: 12-Mar-2021); IntraMuscular; Deltoid Right.; 0.5 milliLiter(s); VIS (VIS Published: 09-May-2013, VIS Presented: 03-May-2019);    COVID-19, mRNA, LNP-S, PF, 30 mcg/0.3 mL dose (Pfizer); 08-Oct-2021 16:24; Crystal Samuel (RN); [employer]; [id number] (Exp. Date: 11-Jan-2021); IntraMuscular; Deltoid Left.; 0.3 milliLiter(s);   Tdap; 03-May-2019 11:04; Kathie Christianson (WOODY); [employer]; [id number] (Exp. Date: 12-Mar-2021); IntraMuscular; Deltoid Right.; 0.5 milliLiter(s); VIS (VIS Published: 09-May-2013, VIS Presented: 03-May-2019);

## 2021-10-08 NOTE — PROGRESS NOTE ADULT - PROBLEM SELECTOR PLAN 1
no dysphagia thyroid function tests normal   management as out-patient
continue off thyroid medications   most work up including any FNA required can be done as out-patient
stable and euthyroid   no symptoms of dysphagia   thyroid nodule appears benign   repeat sonogram as out-patient and FNA prn
no dysphagia or obstructive features   thyroid function tests normal   work up as out-patient

## 2021-10-08 NOTE — DISCHARGE NOTE NURSING/CASE MANAGEMENT/SOCIAL WORK - PATIENT PORTAL LINK FT
You can access the FollowMyHealth Patient Portal offered by [facility] by registering at the following website: [email or web address]. By joining [facility]’s FollowMyHealth portal, you will also be able to view your health information using other applications (apps) compatible with our system.

## 2021-10-08 NOTE — DISCHARGE NOTE PROVIDER - HOSPITAL COURSE
patient  treated  for  pneumonia  with  rocephin  and  azithromycin feels  well  knee pain  improved  no  chest pain  no  sob  appetite  good  evaluated  by  pulmonary  endocrinology ID PT  discharged  to  rehab

## 2021-10-08 NOTE — DISCHARGE NOTE PROVIDER - CARE PROVIDER_API CALL
[place] [address]  Phone: [phone]  Fax: [phone]  Follow Up Time:

## 2021-10-08 NOTE — PROGRESS NOTE ADULT - SUBJECTIVE AND OBJECTIVE BOX
TMAX - 98.6    On day # 9 Rocephin    Vital Signs Last 24 Hrs  T(C): 36.8 (08 Oct 2021 11:18), Max: 37 (07 Oct 2021 23:06)  T(F): 98.2 (08 Oct 2021 11:18), Max: 98.6 (07 Oct 2021 23:06)  HR: 84 (08 Oct 2021 11:18) (74 - 99)  BP: 152/100 (08 Oct 2021 11:18) (124/76 - 152/100)  BP(mean): --  RR: 19 (08 Oct 2021 11:18) (18 - 19)  SpO2: 92% (08 Oct 2021 11:18) (92% - 95%)  Supplemental O2:  on RA     Awake, alert, no c/o SOB or cp and cough is decreasing and mostly dry now.  Appetite is fair.  No c/o abdominal pain or N/V/D.  O2 Sat's remain between 92 -95% on RA .      PHYSICAL EXAM  General: 82 y/o white female awake, alert, lying  in bed now  in semi-upright position, presently on RA, very pleasant and cooperative, in NAD  HEENT:  conj pink, sclerae anicteric, PERRLA, no oral lesions noted, mucosa slightly dry  Neck: supple, no nodes noted  Heart: RR  Chest Wall : well-healed Lt Mastectomy site  Lungs:  decreased BS at the bases otherwise clear  Abdomen: BS+, semi-soft, nontender to palpation, well-healed RUQ scar, well-healed lower abdominal scar, no masses or HS-megaly detected  Back: no CVA or Spinal tenderness elicited to palpation  Extremities: trace edema both LE's with decreased warmth and faded darker erythema from above the ankle to about 1/3 up the calf with some decreased dry scaling skin noted on the LE, nontender to palpation now                    well-healed scar of the Lt. Elbow - no  surrounding erythema or swelling noted  Skin: warm, dry, no rash  Primafit External female catheter remains in place and noted with some pink-tinged urine output in the canister now        I&O's Summary:    07 Oct 2021 07:01  -  08 Oct 2021 07:00  --------------------------------------------------------  IN: 0 mL / OUT: 600 mL / NET: -600 mL      LABS:  CBC Full  -  ( 07 Oct 2021 06:27 )  WBC Count : 9.22 K/uL  RBC Count : 4.92 M/uL  Hemoglobin : 14.6 g/dL  Hematocrit : 45.7 %  Platelet Count - Automated : 265 K/uL  Mean Cell Volume : 92.9 fl  Mean Cell Hemoglobin : 29.7 pg  Mean Cell Hemoglobin Concentration : 31.9 gm/dL  Auto Neutrophil # : x  Auto Lymphocyte # : x  Auto Monocyte # : x  Auto Eosinophil # : x  Auto Basophil # : x  Auto Neutrophil % : x  Auto Lymphocyte % : x  Auto Monocyte % : x  Auto Eosinophil % : x  Auto Basophil % : x    10-07    137  |  100  |  14  ----------------------------<  122<H>  3.9   |  34<H>  |  0.38<L>    Ca    9.1      07 Oct 2021 06:27    Sedimentation Rate, Erythrocyte: 66 mm/hr (10-05 @ 06:30)  Sedimentation Rate, Erythrocyte: 62 mm/hr (10-02 @ 08:36)  Sedimentation Rate, Erythrocyte: 34 mm/hr (09-30 @ 08:09)    C-Reactive Protein, Serum (10.04.21 @ 10:26)    C-Reactive Protein, Serum: 46 mg/L    C-Reactive Protein, Serum (10.02.21 @ 11:51)    C-Reactive Protein, Serum: 84 mg/L    C-Reactive Protein, Serum (09.30.21 @ 12:58)    C-Reactive Protein, Serum: 90 mg/L    Procalcitonin, Serum (10.01.21 @ 11:05)    Procalcitonin, Serum: 0.06:     Procalcitonin, Serum (09.30.21 @ 15:28)    Procalcitonin, Serum: 0.11:     Lactate, Blood (09.29.21 @ 22:24)    Lactate, Blood: 1.4 mmol/L    Mycoplasma Pneumoniae IgM Antibody (09.30.21 @ 12:41)    Mycoplasma IgM AB: 0.38 Index    Mycoplasma: Negative: Method USAMA           Interpretation:                                             Index                  Negative              <=0.90                  Equivocal            0.91-1.09                  Positive                >=1.10      Triiodothyronine, Total (T3 Total) (10.02.21 @ 11:51)    Triiodothyronine, Total (T3 Total): 103 ng/dL    T4, Serum (10.02.21 @ 11:51)    T4, Serum: 7.4 ug/dL    Thyroid Stimulating Hormone, Serum (10.02.21 @ 08:36)    Thyroid Stimulating Hormone, Serum: 0.655 uIU/mL    Thyroid Stimulating Hormone, Serum (09.30.21 @ 08:09)    Thyroid Stimulating Hormone, Serum: 0.639 uIU/mL      MICROBIOLOGY:  Specimen Source: .Blood Blood-Peripheral (10-04 @ 15:18)  Culture Results:   No growth to date. (10-04 @ 15:18)    Specimen Source: .Blood Blood-Peripheral (10-04 @ 15:18)  Culture Results:   No growth to date. (10-04 @ 15:18)    Chlamydia pneumoniae IgG: <1:64 (10-02 @ 11:18)  Chlamydia pneumoniae IgA: <1:16 (10-02 @ 11:18)  Chlamydia pneumoniae IgM: <1:10 (10-02 @ 11:18)      Flu With COVID-19 By VONDA (10.06.21 @ 08:03)    SARS-CoV-2 Result: NotDetec: EUA/IVD    Influenza A Result: NotDetec: EUA/IVD    Influenza B Result: NotDetec: EUA/IVD      Specimen Source: .Blood Blood (09-30 @ 09:04)  Culture Results:   Growth in aerobic bottle: Staphylococcus simulans  Coag Negative Staphylococcus  Single set isolate, possible contaminant. Contact  Microbiology if susceptibility testing clinically  indicated.    Specimen Source: .Blood Blood (09-30 @ 09:04)  Culture Results:   No Growth Final (09-30 @ 09:04)    Specimen Source: Clean Catch Clean Catch (Midstream) (09-30 @ 08:57)  Culture Results:   <10,000 CFU/mL Normal Urogenital Nikki (09-30 @ 08:57)      Legionella Antigen, Urine: Negative (10-02 @ 11:26)    COVID-19 Shahab Domain Antibody (10.01.21 @ 10:46)    COVID-19 Shahab Domain Antibody Result: 0.40: Roche ECLIA Total AB (KIRSTIN)  NOTE: This result index represents a total antibody measurement, which  includes IgG, IgA and IgM.  Measures Receptor Binding Domain of the Shahab Protein  Negative <= 0.79 U/mL  Positive  >= 0.80 U/mL U/mL    COVID-19 Shahab Domain AB Interp: Negative: This test has been authorized for emergency use by the FDA     Flu With COVID-19 By VONDA (09.29.21 @ 16:52)    SARS-CoV-2 Result: NotDetec: EUA/IVD  This Respiratory Panel uses polymerase chain reaction (PCR) to detect for  influenza A; influenza B; and SARS-CoV-2.    Influenza A Result: NotDetec: EUA/IVD    Influenza B Result: NotDetec: EUA/IVD      Radiology:                < from: Xray Chest 1 View- PORTABLE-Urgent (Xray Chest 1 View- PORTABLE-Urgent .) (10.06.21 @ 11:22) >  EXAM:  XR CHEST PORTABLE URGENT 1V                        EXAM:  XR CHEST PORTABLE ROUTINE 1V                        PROCEDURE DATE:  10/05/2021    INTERPRETATION:  Portable chest radiograph  CLINICAL INFORMATION: Cough  TECHNIQUE:Portable  AP view of the chest.  COMPARISON: 9/29/2021 chest available for review.  FINDINGS:  The lungs show decreasing bilateral  diffuse airspace disease. Residual small RIGHT lung base opacity.  . No pneumothorax.  The heart ad mediastinum size and configuration are within normal limits.  Visualized osseous structures are intact.  IMPRESSION:   Decreasing bilateral diffuse airspace disease with residual small RIGHT lung base opacity..                           < from: Xray Chest 1 View- PORTABLE-Routine (Xray Chest 1 View- PORTABLE-Routine in AM.) (10.05.21 @ 08:13) >  EXAM:  XR CHEST PORTABLE URGENT 1V                        EXAM:  XR CHEST PORTABLE ROUTINE 1V                        PROCEDURE DATE:  10/05/2021    INTERPRETATION:  Portable chest radiograph  CLINICAL INFORMATION: Cough  TECHNIQUE:Portable  AP view of the chest.  COMPARISON: 9/29/2021 chest available for review.  FINDINGS:  The lungs show decreasing bilateral  diffuse airspace disease. Residual small RIGHT lung base opacity.  . No pneumothorax.  The heart and mediastinum size and configuration are within normal limits.  Visualized osseous structures are intact.  IMPRESSION:   Decreasing bilateral diffuse airspace disease with residual small RIGHT lung base opacity..                           < from: US Duplex Venous Lower Ext Complete, Bilateral (10.01.21 @ 19:29) >  EXAM:  US DPLX LWR EXT VEINS COMPL BI                        PROCEDURE DATE:  10/01/2021    INTERPRETATION:  CLINICAL INFORMATION: 81 years  Female with r/o DVT. Bilateral lower extremity swelling, tenderness and erythema.  COMPARISON: None available.  TECHNIQUE: Duplex sonography of the BILATERAL LOWER extremity veins with color and spectral Doppler, with and without compression.  FINDINGS:  RIGHT:  Normal compressibility of the RIGHT common femoral, femoral and popliteal veins.  Doppler examination shows normal spontaneous and phasic flow.  No RIGHT calf vein thrombosis is detected.    LEFT:  Normal compressibility of the LEFT common femoral, femoral and popliteal veins.  Doppler examination shows normal spontaneous and phasic flow.  No LEFT calf vein thrombosis is detected.    IMPRESSION:  No evidence of deep venous thrombosis in either lower extremity.                 < from: US Thyroid (09.30.21 @ 08:42) >  EXAM:  US THYROID ONLY                        PROCEDURE DATE:  09/30/2021    INTERPRETATION:  Indication: Abnormal thyroid on CT of 9/29/2021.  Bilateral thyroid ultrasound.  Right thyroid lobe 6.2 x 3.4 x 3.4 cm. Left thyroid lobe 6.1 x 3.9 x 3.9 cm. Isthmus measures 0.54 cm AP. Multiple bilateral solid nodules, some with calcifications. The dominant nodule in the right lower pole measures 1.6 cm.    IMPRESSION: Enlarged multinodular thyroid as discussed. Consider biopsy of the dominant nodule for tissue specific diagnosis.            < from: CT Chest No Cont (09.29.21 @ 20:48) >  EXAM:  CT CHEST                        PROCEDURE DATE:  09/29/2021    INTERPRETATION:  CLINICAL INFORMATION: weakness, evaluate for pneumonia.  COMPARISON: Comparison to numerous prior examinations most recent on 9/29/2021  CONTRAST/COMPLICATIONS:  IV Contrast: None  Oral Contrast: None  Complications: None  PROCEDURE:  CT of the Chest was performed.  Sagittal and coronal reformats were performed.  FINDINGS:    LUNGS AND AIRWAYS: Patent central airways.  Multifocal consolidations in the right lower and left lower lobes. Subsegmental atelectasis versus scarring bilateral upper lobes and bilateral lower lobes.  PLEURA: No pleural effusion.  MEDIASTINUM AND ABNER: No lymphadenopathy.  VESSELS: Atherosclerotic calcifications ofthe aorta and coronary arteries.  HEART: Heart size is normal. No pericardial effusion.  CHEST WALL AND LOWER NECK: Status post left mastectomy and left axillary dissection. Enlarged left lobe of the thyroid with multiple hypoattenuating nodules and small calcifications.  VISUALIZED UPPER ABDOMEN: Moderate size hiatal hernia. Simple cyst in the left kidney measuring 2.4 cm.  BONES: Degenerative changes. Small sclerotic focus at the right glenoid (7, 6).    IMPRESSION:  Multifocal consolidations in the right and left lower lobes, correlate clinically for pneumonia.  Enlarged left lobe of the thyroid gland with multiple hypoattenuating nodules and small calcifications, recommend nonemergent thyroid ultrasound.  Small sclerotic focus in the right glenoid, recommend bone scan for further evaluation.                   < from: Xray Knee 3 Views, Bilateral (09.29.21 @ 16:41) >  EXAM:  XR KNEE 3 VIEWS BI                        PROCEDURE DATE:  09/29/2021    INTERPRETATION:  Bilateral knees  HISTORY:Difficult ambulation for two days   Three views of the right knee and three views of the left knee show no evidence of acute fracture nor destructive change. The joint spaces show mild arthritic changes of the medial compartments with calcified menisci bilaterally. Vascular calcifications are present. Small nodule of the lower right thigh may be in the skin asit is not visualized in the right lateral image.  IMPRESSION: No acute bony pathology.  Note - This does not exclude fractures in perfect alignment and apposition as presence may be indicated at one to three weeks following callus formation.    < from: Xray Chest 1 View- PORTABLE-Urgent (Xray Chest 1 View- PORTABLE-Urgent .) (09.29.21 @ 16:40) >  EXAM:  XR CHEST PORTABLE URGENT 1V                        PROCEDURE DATE:  09/29/2021    INTERPRETATION:  Chest one view  HISTORY: Weakness  COMPARISON STUDY: 5/5/2019  Frontal expiratory view of the chest shows the heart to be similar in size. The lungs show bilateral patchy infiltrates, right more than left and there is no evidence of pneumothorax nor pleural effusion.    IMPRESSION:  Patchy infiltrates.  Further information may be obtained from the patient's subsequent CT of the chest.      Impression:    82 y/o white female with hx of HTN, Lt. Breast Ca, s/p Lt. Mastectomy with Lymph Node Dissection,  s/p Cholecystectomy, s/p Fx/ Dislocation of the Lt Elbow requiring Lt. Elbow ORIF in 5/19 at [facility], recent Cellulitis of the LE's requiring ab rx about 1 month ago and subsequent rx with a diuretic for continued swelling of the LE's with ultimate improvement,  reported Short-term memory loss, chronic knee pain, admitted via the ER on 9/29/21 with c/o difficulty ambulating and w/u revealed a fever to 100.3, leukocytosis, and Multifocal Bilateral Lower Lobe Consolidations on CXR and CT of the Chest along with an enlarged/ multi-nodular Thyroid gland and a Sclerotic area in the Rt. Glenoid.  Patient was begun on Rocephin + Zithromax po and now with 1 blood Cx out of 2 growing Gm Positive Cocci in Clusters confirmed as Staph Simulans.  Sepsis presumably due to Multifocal Bilateral lower Lobe PNA and ? CNS  Bacteremia may represent a contaminant, however patient has underlying hardware in the Lt Elbow along with apparent ongoing/ recurrent Cellulitis of the LE's superimposed on underlying venous stasis changes.  Noted that venous Doppler of the LE's is Negative for DVT.  Noted COVID-19 Ab is negative ( consistent with hx that patient has NOT been vaccinated as yet ).  Thyroid profile is WNL and patient evaluated by Endocrinology re: enlarged Multinodular Thyroid with recommendation for FNA as outpatient.  Legionella Urine Ag is now Negative.  Repeat Blood Cx's from 10/4/21 remain negative thus far and Chlamydia pneumoniae Ab is now Negative.  WBC's remain WNL and repeat CXR's  done 10/5/21 and again 10/6/21 are reported with decrease of the bilateral infiltrates with residual  small Rt. Base opacity.    Suggestions:    Will therefore continue current ab rx with Rocephin for 1 more day  Follow-up temps. labs. and O2 Sat's.  As patient is clinically improved would recommend COVID-19 Vaccination  - discussed with patient again and with her daughter-in-law Shayy Mathew via phone ( [phone]) on 10/7/21 and  she was amenable to this as well as recommendation for subsequent Influenza Vaccination at a later date.  Arrangements in progress for d/c to Rehab for continued rx.  COVID-19 Pfizer Vaccine 1st dose ordered 10/7/21 and awaiting administration of the dose now.

## 2021-10-08 NOTE — DISCHARGE NOTE PROVIDER - NSDCMRMEDTOKEN_GEN_ALL_CORE_FT
acetaminophen 325 mg oral tablet: 2 tab(s) orally every 6 hours, As needed, Temp greater or equal to 38.5C (101.3F), Moderate Pain (4 - 6)  Augmentin 875 mg-125 mg oral tablet: 875 milligram(s) orally every 12 hours   enoxaparin: 40 milligram(s) subcutaneous once a day x one  week  lactobacillus acidophilus oral capsule: 1 tab(s) orally 2 times a day x 2 weeks  metoprolol tartrate 25 mg oral tablet: 1 tab(s) orally 2 times a day

## 2021-10-08 NOTE — PROGRESS NOTE ADULT - SUBJECTIVE AND OBJECTIVE BOX
Patient is a 81y old  Female who presents with a chief complaint of pneumonia (08 Oct 2021 11:32)      Interval History: no change in clinical status   no dysphagia   thyroid sonogram : 6.2 x 3.4 x 3.4 cm. Left thyroid lobe 6.1 x 3.9 x 3.9 cm. Isthmus measures 0.54 cm AP. Multiple bilateral solid nodules, some with calcifications. The dominant nodule in the right lower pole measures 1.6 cm.    MEDICATIONS  (STANDING):  cefTRIAXone   IVPB 1000 milliGRAM(s) IV Intermittent every 24 hours  enoxaparin Injectable 40 milliGRAM(s) SubCutaneous daily  lactobacillus acidophilus 1 Tablet(s) Oral two times a day  metoprolol tartrate 25 milliGRAM(s) Oral two times a day  mineral oil for Topical Use 1 Application(s) Topical two times a day    MEDICATIONS  (PRN):  acetaminophen   Tablet .. 650 milliGRAM(s) Oral every 6 hours PRN Temp greater or equal to 38.5C (101.3F), Moderate Pain (4 - 6)      Allergies    No Known Allergies    Intolerances        REVIEW OF SYSTEMS:  CONSTITUTIONAL: no changes  EYES: No eye pain, visual disturbances, or discharge  ENMT:  No difficulty hearing, No sinus or throat pain  NECK: No pain or stiffness  RESPIRATORY: No cough, wheezing, chills or hemoptysis; No shortness of breath  CARDIOVASCULAR: No chest pain, palpitations or leg swelling  GASTROINTESTINAL: No abdominal or epigastric pain. No nausea, vomiting, or hematemesis; No diarrhea or constipation. No melena or hematochezia.  GENITOURINARY: No dysuria, frequency, hematuria, or incontinence  NEUROLOGICAL: No headaches, memory loss, loss of strength, numbness, or tremors  SKIN: No itching, burning, rashes, or lesions   ENDOCRINE: No heat or cold intolerance; No hair loss  MUSCULOSKELETAL: No joint pain or swelling; No muscle, back, or extremity pain  PSYCHIATRIC: No depression, anxiety, mood swings, or difficulty sleeping  HEME/LYMPH: No easy bruising, or bleeding gums  ALLERY AND IMMUNOLOGIC: No hives or eczema    Vital Signs Last 24 Hrs  T(C): 36.8 (08 Oct 2021 11:18), Max: 37 (07 Oct 2021 23:06)  T(F): 98.2 (08 Oct 2021 11:18), Max: 98.6 (07 Oct 2021 23:06)  HR: 84 (08 Oct 2021 11:18) (84 - 99)  BP: 152/100 (08 Oct 2021 11:18) (125/74 - 152/100)  BP(mean): --  RR: 19 (08 Oct 2021 11:18) (18 - 19)  SpO2: 92% (08 Oct 2021 11:18) (92% - 95%)    PHYSICAL EXAM:  GENERAL:   HEAD: Atraumatic, Normocephalic  EYES: PERRLA, conjunctiva and sclera clear  ENMT: No  exudates,; Moist mucous membranes,, No lesions  NECK: Supple, No JVD, Normal thyroid  NERVOUS SYSTEM:  Alert & Oriented,   CHEST/LUNG: Clear to auscultation bilaterally; No rales, rhonchi, wheezing, or rubs  HEART: Regular rate and rhythm; No murmurs, rubs, or gallops  ABDOMEN: Soft, Nontender, Nondistended; Bowel sounds present  EXTREMITIES:  2+ Peripheral Pulses, no edema  SKIN: No rashes or lesions    LABS:        CAPILLARY BLOOD GLUCOSE      POCT Blood Glucose.: 141 mg/dL (08 Oct 2021 07:25)    Lipid panel:           Thyroid:  Diabetes Tests:  Parathyroid Panel:  Adrenals:  RADIOLOGY & ADDITIONAL TESTS:    Imaging Personally Reviewed:  [ ] YES  [ ] NO    Consultant(s) Notes Reviewed:  [ ] YES  [ ] NO    Care Discussed with Consultants/Other Providers [ ] YES  [ ] NO

## 2022-05-04 NOTE — H&P ADULT - PROBLEM SELECTOR PROBLEM 5
Patient: Courtney Wooten    HTN (hypertension)

## 2023-05-06 NOTE — PROGRESS NOTE ADULT - SUBJECTIVE AND OBJECTIVE BOX
LACTATION NOTE - MOTHER      Evaluation Type: Inpatient    Problems identified  Problems identified: Knowledge deficit  Problems Identified Other: infant sleepy         Breastfeeding goal  Breastfeeding goal: To maintain breast milk feeding per patient goal    Maternal Assessment  Bilateral Nipples: Colostrum easily expressed; WNL  Prior breastfeeding experience (comment below): First baby to breast;Pumped & bottle fed;Successful  Prior BF experience: comment: 36 week twins exclusively pump and bottle fed X1 year  Breastfeeding Assistance: Breastfeeding assistance provided with permission    Pain assessment  Location/Comment: denies    Guidelines for use of:  Breast pump type: Ameda Platinum  Current use of pump[de-identified] every 3 hours per pt  Suggested use of pump: Pump if infant is not latching to breast;Pump each time a supplement is offered  Reported pumping volumes (ml): 20 mls  Other (comment): Assisted with latch but infant not sucking once latched so provided a nipple shield and infant sustained latch for 5 min with swallows heard before falling asleep. Encouraged pt to continue latching infant and providing an ebm supplement after.  D/c ed provided, opv 5//15 @ 4pm.     INTERVAL HPI/OVERNIGHT EVENTS:        REVIEW OF SYSTEMS:  CONSTITUTIONAL:  feels  well  no  complaints    NECK: No pain or stiffnes  RESPIRATORY: No SOB   CARDIOVASCULAR: No chest pain, palpitations, dizziness,   GASTROINTESTINAL: No abdominal pain. No nausea, vomiting,   NEUROLOGICAL: No headaches, no  blurry  vision no  dizziness  SKIN: No itching,   MUSCULOSKELETAL: No pain    MEDICATION:  azithromycin   Tablet 500 milliGRAM(s) Oral daily  cefTRIAXone   IVPB 1000 milliGRAM(s) IV Intermittent every 24 hours  enoxaparin Injectable 40 milliGRAM(s) SubCutaneous daily  lactobacillus acidophilus 1 Tablet(s) Oral two times a day  metoprolol tartrate 25 milliGRAM(s) Oral two times a day  mineral oil for Topical Use 1 Application(s) Topical two times a day  vancomycin  IVPB      vancomycin  IVPB 1000 milliGRAM(s) IV Intermittent every 12 hours    Vital Signs Last 24 Hrs  T(C): 36.8 (03 Oct 2021 11:50), Max: 36.9 (02 Oct 2021 17:02)  T(F): 98.3 (03 Oct 2021 11:50), Max: 98.4 (02 Oct 2021 17:02)  HR: 92 (03 Oct 2021 11:50) (79 - 116)  BP: 136/64 (03 Oct 2021 11:50) (136/64 - 147/70)  BP(mean): --  RR: 19 (03 Oct 2021 11:50) (18 - 19)  SpO2: 94% (03 Oct 2021 11:50) (90% - 94%)    PHYSICAL EXAM:  GENERAL: NAD, well-groomed, well-developed  HEENT : Conjuntivae  clear sclerae anicteric  NECK: Supple, No JVD, Normal thyroid  NERVOUS SYSTEM:  Alert oriented   no  focal  deficits;   CHEST/LUNG: Clear    HEART: Regular rate and rhythm; No murmurs, rubs, or gallops  ABDOMEN: Soft, Nontender, Nondistended; Bowel sounds present  EXTREMITIES:  no  edema no  tenderness  SKIN: No rashes   LABS:                        14.1   9.40  )-----------( 222      ( 03 Oct 2021 07:50 )             44.6     10-03    139  |  103  |  13  ----------------------------<  122<H>  4.0   |  32<H>  |  0.37<L>    Ca    9.1      03 Oct 2021 07:50          CAPILLARY BLOOD GLUCOSE          RADIOLOGY & ADDITIONAL TESTS:    Imaging reports  Personally Reviewed:  [ ] YES  [ ] NO    Consultant(s) Notes Reviewed:  [x ] YES  [ ] NO    Care Discussed with Consultants/Other Providers [x ] YES  [ ] NO  Problem/Plan - 1:  ·  Problem: Ambulatory dysfunction.   ·  Plan: -Patient p/w knee pain and inability to ambulate   -Topical NSAID       Problem/Plan - 2:  ·  Problem: Pneumonia.   ·  Plan: -observe  with  ceftriaxone  vanco  -  Problem/Plan - 3:  ·  Problem: Chronic venous stasis dermatitis.   ·  Plan: -Patient noted to have chronic venous stasis  changes, no s/s of infection   -Keep legs elevated.    Problem/Plan - 4:  ·  Problem: Thyroid nodule.   endocrinology  evaluation      Problem/Plan - 5:  ·  Problem: HTN (hypertension).   ·  Plan: -Patient has hx of HTN metoprolol.    Problem/Plan - 6:  ·  Problem: Preventive measure.   ·  Plan: Lovenox S/C.

## 2023-07-11 ENCOUNTER — INPATIENT (INPATIENT)
Facility: HOSPITAL | Age: 83
LOS: 2 days | Discharge: NURSING, REHAB OR LONG-TERM CARE | End: 2023-07-14
Attending: STUDENT IN AN ORGANIZED HEALTH CARE EDUCATION/TRAINING PROGRAM | Admitting: STUDENT IN AN ORGANIZED HEALTH CARE EDUCATION/TRAINING PROGRAM
Payer: MEDICARE

## 2023-07-11 VITALS
TEMPERATURE: 99 F | DIASTOLIC BLOOD PRESSURE: 87 MMHG | OXYGEN SATURATION: 96 % | WEIGHT: 220.02 LBS | HEIGHT: 63 IN | HEART RATE: 89 BPM | SYSTOLIC BLOOD PRESSURE: 152 MMHG | RESPIRATION RATE: 18 BRPM

## 2023-07-11 DIAGNOSIS — R41.82 ALTERED MENTAL STATUS, UNSPECIFIED: ICD-10-CM

## 2023-07-11 DIAGNOSIS — I10 ESSENTIAL (PRIMARY) HYPERTENSION: ICD-10-CM

## 2023-07-11 DIAGNOSIS — A41.9 SEPSIS, UNSPECIFIED ORGANISM: ICD-10-CM

## 2023-07-11 DIAGNOSIS — Z90.12 ACQUIRED ABSENCE OF LEFT BREAST AND NIPPLE: Chronic | ICD-10-CM

## 2023-07-11 DIAGNOSIS — Z29.9 ENCOUNTER FOR PROPHYLACTIC MEASURES, UNSPECIFIED: ICD-10-CM

## 2023-07-11 DIAGNOSIS — G30.9 ALZHEIMER'S DISEASE, UNSPECIFIED: ICD-10-CM

## 2023-07-11 LAB
ALBUMIN SERPL ELPH-MCNC: 3.1 G/DL — LOW (ref 3.3–5)
ALP SERPL-CCNC: 78 U/L — SIGNIFICANT CHANGE UP (ref 40–120)
ALT FLD-CCNC: 104 U/L — HIGH (ref 12–78)
ANION GAP SERPL CALC-SCNC: 6 MMOL/L — SIGNIFICANT CHANGE UP (ref 5–17)
APPEARANCE UR: CLEAR — SIGNIFICANT CHANGE UP
AST SERPL-CCNC: 84 U/L — HIGH (ref 15–37)
BACTERIA # UR AUTO: ABNORMAL
BASOPHILS # BLD AUTO: 0.03 K/UL — SIGNIFICANT CHANGE UP (ref 0–0.2)
BASOPHILS NFR BLD AUTO: 0.2 % — SIGNIFICANT CHANGE UP (ref 0–2)
BILIRUB SERPL-MCNC: 1.4 MG/DL — HIGH (ref 0.2–1.2)
BILIRUB UR-MCNC: NEGATIVE — SIGNIFICANT CHANGE UP
BUN SERPL-MCNC: 12 MG/DL — SIGNIFICANT CHANGE UP (ref 7–23)
CALCIUM SERPL-MCNC: 9.1 MG/DL — SIGNIFICANT CHANGE UP (ref 8.5–10.1)
CHLORIDE SERPL-SCNC: 101 MMOL/L — SIGNIFICANT CHANGE UP (ref 96–108)
CO2 SERPL-SCNC: 30 MMOL/L — SIGNIFICANT CHANGE UP (ref 22–31)
COLOR SPEC: YELLOW — SIGNIFICANT CHANGE UP
CREAT SERPL-MCNC: 0.75 MG/DL — SIGNIFICANT CHANGE UP (ref 0.5–1.3)
DIFF PNL FLD: ABNORMAL
EGFR: 78 ML/MIN/1.73M2 — SIGNIFICANT CHANGE UP
EOSINOPHIL # BLD AUTO: 0.04 K/UL — SIGNIFICANT CHANGE UP (ref 0–0.5)
EOSINOPHIL NFR BLD AUTO: 0.3 % — SIGNIFICANT CHANGE UP (ref 0–6)
EPI CELLS # UR: SIGNIFICANT CHANGE UP
GLUCOSE SERPL-MCNC: 137 MG/DL — HIGH (ref 70–99)
GLUCOSE UR QL: NEGATIVE MG/DL — SIGNIFICANT CHANGE UP
HCT VFR BLD CALC: 54.3 % — HIGH (ref 34.5–45)
HGB BLD-MCNC: 18.1 G/DL — HIGH (ref 11.5–15.5)
IMM GRANULOCYTES NFR BLD AUTO: 0.3 % — SIGNIFICANT CHANGE UP (ref 0–0.9)
KETONES UR-MCNC: NEGATIVE — SIGNIFICANT CHANGE UP
LEUKOCYTE ESTERASE UR-ACNC: ABNORMAL
LYMPHOCYTES # BLD AUTO: 0.68 K/UL — LOW (ref 1–3.3)
LYMPHOCYTES # BLD AUTO: 5.6 % — LOW (ref 13–44)
MCHC RBC-ENTMCNC: 30.2 PG — SIGNIFICANT CHANGE UP (ref 27–34)
MCHC RBC-ENTMCNC: 33.3 G/DL — SIGNIFICANT CHANGE UP (ref 32–36)
MCV RBC AUTO: 90.5 FL — SIGNIFICANT CHANGE UP (ref 80–100)
MONOCYTES # BLD AUTO: 1.03 K/UL — HIGH (ref 0–0.9)
MONOCYTES NFR BLD AUTO: 8.5 % — SIGNIFICANT CHANGE UP (ref 2–14)
NEUTROPHILS # BLD AUTO: 10.26 K/UL — HIGH (ref 1.8–7.4)
NEUTROPHILS NFR BLD AUTO: 85.1 % — HIGH (ref 43–77)
NITRITE UR-MCNC: NEGATIVE — SIGNIFICANT CHANGE UP
NRBC # BLD: 0 /100 WBCS — SIGNIFICANT CHANGE UP (ref 0–0)
PH UR: 7 — SIGNIFICANT CHANGE UP (ref 5–8)
PLATELET # BLD AUTO: 209 K/UL — SIGNIFICANT CHANGE UP (ref 150–400)
POTASSIUM SERPL-MCNC: 4.2 MMOL/L — SIGNIFICANT CHANGE UP (ref 3.5–5.3)
POTASSIUM SERPL-SCNC: 4.2 MMOL/L — SIGNIFICANT CHANGE UP (ref 3.5–5.3)
PROT SERPL-MCNC: 8.1 GM/DL — SIGNIFICANT CHANGE UP (ref 6–8.3)
PROT UR-MCNC: 15 MG/DL
RBC # BLD: 6 M/UL — HIGH (ref 3.8–5.2)
RBC # FLD: 13.1 % — SIGNIFICANT CHANGE UP (ref 10.3–14.5)
RBC CASTS # UR COMP ASSIST: ABNORMAL /HPF (ref 0–4)
SODIUM SERPL-SCNC: 137 MMOL/L — SIGNIFICANT CHANGE UP (ref 135–145)
SP GR SPEC: 1.01 — SIGNIFICANT CHANGE UP (ref 1.01–1.02)
UROBILINOGEN FLD QL: 4 MG/DL
WBC # BLD: 12.08 K/UL — HIGH (ref 3.8–10.5)
WBC # FLD AUTO: 12.08 K/UL — HIGH (ref 3.8–10.5)
WBC UR QL: SIGNIFICANT CHANGE UP

## 2023-07-11 PROCEDURE — 99222 1ST HOSP IP/OBS MODERATE 55: CPT

## 2023-07-11 PROCEDURE — 70450 CT HEAD/BRAIN W/O DYE: CPT | Mod: 26,MC

## 2023-07-11 PROCEDURE — 71045 X-RAY EXAM CHEST 1 VIEW: CPT | Mod: 26

## 2023-07-11 PROCEDURE — 99285 EMERGENCY DEPT VISIT HI MDM: CPT

## 2023-07-11 PROCEDURE — 93010 ELECTROCARDIOGRAM REPORT: CPT

## 2023-07-11 PROCEDURE — 72125 CT NECK SPINE W/O DYE: CPT | Mod: 26,MC

## 2023-07-11 RX ORDER — SODIUM CHLORIDE 9 MG/ML
500 INJECTION, SOLUTION INTRAVENOUS ONCE
Refills: 0 | Status: COMPLETED | OUTPATIENT
Start: 2023-07-11 | End: 2023-07-11

## 2023-07-11 RX ORDER — CEFTRIAXONE 500 MG/1
1000 INJECTION, POWDER, FOR SOLUTION INTRAMUSCULAR; INTRAVENOUS EVERY 24 HOURS
Refills: 0 | Status: COMPLETED | OUTPATIENT
Start: 2023-07-12 | End: 2023-07-14

## 2023-07-11 RX ORDER — METOPROLOL TARTRATE 50 MG
25 TABLET ORAL
Refills: 0 | Status: DISCONTINUED | OUTPATIENT
Start: 2023-07-11 | End: 2023-07-14

## 2023-07-11 RX ORDER — SODIUM CHLORIDE 9 MG/ML
500 INJECTION INTRAMUSCULAR; INTRAVENOUS; SUBCUTANEOUS ONCE
Refills: 0 | Status: COMPLETED | OUTPATIENT
Start: 2023-07-11 | End: 2023-07-11

## 2023-07-11 RX ORDER — NYSTATIN CREAM 100000 [USP'U]/G
1 CREAM TOPICAL ONCE
Refills: 0 | Status: COMPLETED | OUTPATIENT
Start: 2023-07-11 | End: 2023-07-11

## 2023-07-11 RX ORDER — ACETAMINOPHEN 500 MG
650 TABLET ORAL ONCE
Refills: 0 | Status: COMPLETED | OUTPATIENT
Start: 2023-07-11 | End: 2023-07-11

## 2023-07-11 RX ORDER — CEFTRIAXONE 500 MG/1
1000 INJECTION, POWDER, FOR SOLUTION INTRAMUSCULAR; INTRAVENOUS ONCE
Refills: 0 | Status: COMPLETED | OUTPATIENT
Start: 2023-07-11 | End: 2023-07-11

## 2023-07-11 RX ADMIN — SODIUM CHLORIDE 1000 MILLILITER(S): 9 INJECTION, SOLUTION INTRAVENOUS at 18:43

## 2023-07-11 RX ADMIN — SODIUM CHLORIDE 500 MILLILITER(S): 9 INJECTION INTRAMUSCULAR; INTRAVENOUS; SUBCUTANEOUS at 15:16

## 2023-07-11 RX ADMIN — CEFTRIAXONE 100 MILLIGRAM(S): 500 INJECTION, POWDER, FOR SOLUTION INTRAMUSCULAR; INTRAVENOUS at 18:17

## 2023-07-11 RX ADMIN — SODIUM CHLORIDE 500 MILLILITER(S): 9 INJECTION INTRAMUSCULAR; INTRAVENOUS; SUBCUTANEOUS at 16:57

## 2023-07-11 RX ADMIN — Medication 650 MILLIGRAM(S): at 18:13

## 2023-07-11 RX ADMIN — NYSTATIN CREAM 1 APPLICATION(S): 100000 CREAM TOPICAL at 17:04

## 2023-07-11 RX ADMIN — Medication 25 MILLIGRAM(S): at 18:39

## 2023-07-11 NOTE — ED PROVIDER NOTE - OBJECTIVE STATEMENT
83F PMH HTN, dementia BIBEMS d/t weakness. Per triage note, pt daughter called 911.     PMH as above, PSH mastectomy, NKDA, meds as listed. 83F PMH HTN, dementia BIBEMS d/t weakness. Per triage note, pt daughter called 911. Pt AAOx1, unsure why she is in the ED. Pt w/o complaints.     PMH as above, PSH mastectomy, NKDA, meds as listed.

## 2023-07-11 NOTE — ED ADULT NURSE NOTE - NSFALLHARMRISKINTERV_ED_ALL_ED

## 2023-07-11 NOTE — H&P ADULT - ASSESSMENT
Patient is an 83F with a PMH of HTN and dementia (AAOx1 at baseline) who presents to the ED for weakness.  Patient unable to provide history.  Per ED staff, patient noted to have worsening weakness and confusion over the last few days.  Reportedly ambulatory with assistance at baseline but now having difficulty walking.  Patient has a history of pneumonia and UTI as per staff.  Febrile in the ED to 103, labs show mild leukocytosis.  Will admit to med surg.

## 2023-07-11 NOTE — ED PROVIDER NOTE - NS ED MD DISPO ISOLATION TYPES
Spoke with patient informed them per Dr Armani Urena  She can call the Psychiatry that she already sees. If unable appt with any provider, or sister site    Patient states that she is not seeing Psychiatry, declined sooner appointment with other provider, declined appointment next Friday with Dr Urena   States she will wait for her scheduled appointment  4/24/2023 1:00 PM Armani Urena MD     Patient states understanding and no further questions at this time.   None

## 2023-07-11 NOTE — ED ADULT NURSE NOTE - OBJECTIVE STATEMENT
83 y old female AO x1.  Pt oriented to self, but not oriented to place, time, or situation.  PMH of dementia, HTN, H/o breast cancer, left mastectomy.  Daughter was at bedside to help obtain history.  Daughter reports the pt has been weak and experiencing pain x1 week.  C/o neck pain and difficulty walking d/t weakness. Pt has declined physically in the past week.  Mentally confused but remaining at baseline.  Pt is unable to accurately self report.  Dena Obando (daughter) is available to report [phone].  DNU left extremity d/t left mastectomy, unable to recall if lymph nodes were removed.

## 2023-07-11 NOTE — ED ADULT TRIAGE NOTE - CHIEF COMPLAINT QUOTE
Daughter called 911 for general weakness H/O dementia, UTIs yelling out for a hug in triage awake and responding unable to ans questions Daughter called 911 for general weakness H/O dementia, UTIs yelling out for a hug in triage, awake and responding unable to ans questions

## 2023-07-11 NOTE — ED PROVIDER NOTE - CLINICAL SUMMARY MEDICAL DECISION MAKING FREE TEXT BOX
83F PMH HTN, dementia BIBEMS d/t weakness. Afebrile, VSS. Well appearing, in NAD. Plan for CBC, CMP, CXR, ECG, UA/C. Re-eval. 83F PMH HTN, dementia BIBEMS d/t weakness. Afebrile, VSS. Well appearing, in NAD. Plan for CBC, CMP, CXR, ECG, UA/C. Contact family for collateral. Re-eval. 83F PMH HTN, dementia BIBEMS d/t weakness. Afebrile, VSS. Well appearing, in NAD. Plan for CBC, CMP, CXR, ECG, UA/C, CT head / neck. Contact family for collateral. Re-eval.  W/u significant for: + mild leukocytosis to 12.0, Hgb 18.1, UA w/ many bacteria, CXR unremarkable. CT brain / C-spine w/o acute pathology. On re-eval, pt resting comfortably, in NAD. Rpt VS w/ + T100.3F orally and HR > 100. Antipyretics, IV abx ordered. Will admit to medicine (d/w Dr Gordon).

## 2023-07-11 NOTE — ED PROVIDER NOTE - PHYSICAL EXAMINATION
GEN: Awake, alert, interactive, NAD.  HEAD AND NECK: NC/AT. Airway patent. Neck supple.   EYES:  Clear b/l. EOMI. PERRL.   ENT: Moist mucus membranes.   CARDIAC: Regular rate, regular rhythm. No evident pedal edema.    RESP/CHEST: Normal respiratory effort with no use of accessory muscles or retractions. Clear throughout on auscultation.  ABD: Soft, non-distended, non-tender. No rebound, no guarding.   BACK: No midline spinal TTP. No CVAT.   EXTREMITIES: Moving all extremities with no apparent deformities.   SKIN: Warm, dry, intact normal color. No rash.   NEURO: AOx1, CN II-XII grossly intact, no focal deficits.   PSYCH: Appropriate mood and affect.

## 2023-07-11 NOTE — ED PROVIDER NOTE - PROGRESS NOTE DETAILS
Attempted to call son at ph # listed in chart, no answer. W/o ph # for daughter Angeline. Located ph # for KRISTA Enedina in prior chart: [phone]. Pt w/ hx dementia, but typically feeds herself / walks around home w/ assistance. Past several days, pt unable to get up, leaning to one side, pt complaining of neck pain & h/a. Pt lives at home w/ family & has HHA, though aide who knows pt well was away on vacation, aide w/ pt not as good w/ pt, pt staying in wet diapers,  area moist / raw. Pt w/ hx similar w/ walking PNA 2yrs ago. DIL does not believe pt w/ recent fall. Denies fever, cough, vomiting, diarrhea. Attempted to call son at ph # listed in chart, no answer. W/o ph # for daughter Enedina.

## 2023-07-11 NOTE — H&P ADULT - NSHPLABSRESULTS_GEN_ALL_CORE
Recent Vitals  T(C): 37.9 (23 @ 17:29), Max: 37.9 (23 @ 17:29)  HR: 105 (23 @ 17:29) (89 - 105)  BP: 192/86 (23 @ 17:29) (152/87 - 192/86)  RR: 25 (23 @ 17:29) (17 - 25)  SpO2: 100% (23 @ 17:29) (90% - 100%)                        18.1   12.08 )-----------( 209      ( 2023 15:00 )             54.3         137  |  101  |  12  ----------------------------<  137<H>  4.2   |  30  |  0.75    Ca    9.1      2023 15:00    TPro  8.1  /  Alb  3.1<L>  /  TBili  1.4<H>  /  DBili  x   /  AST  84<H>  /  ALT  104<H>  /  AlkPhos  78  07-11      LIVER FUNCTIONS - ( 2023 15:00 )  Alb: 3.1 g/dL / Pro: 8.1 gm/dL / ALK PHOS: 78 U/L / ALT: 104 U/L / AST: 84 U/L / GGT: x           Urinalysis Basic - ( 2023 16:00 )    Color: Yellow / Appearance: Clear / S.010 / pH: x  Gluc: x / Ketone: Negative  / Bili: Negative / Urobili: 4 mg/dL   Blood: x / Protein: 15 mg/dL / Nitrite: Negative   Leuk Esterase: Small / RBC: 3-5 /HPF / WBC 3-5   Sq Epi: x / Non Sq Epi: x / Bacteria: Many        Home Medications:  acetaminophen 325 mg oral tablet: 2 tab(s) orally every 6 hours, As needed, Temp greater or equal to 38.5C (101.3F), Moderate Pain (4 - 6) (08 Oct 2021 11:46)  enoxaparin: 40 milligram(s) subcutaneous once a day x one  week (08 Oct 2021 11:46)  lactobacillus acidophilus oral capsule: 1 tab(s) orally 2 times a day x 2 weeks (08 Oct 2021 11:46)  metoprolol tartrate 25 mg oral tablet: 1 tab(s) orally 2 times a day (08 Oct 2021 11:46)

## 2023-07-11 NOTE — H&P ADULT - PROBLEM SELECTOR PLAN 1
Started on ceftriaxone in the ED, will continue.  Follow blood cultures - de-escalate antibiotics as needed   CXR without infiltrates (unofficially), UA wnl

## 2023-07-11 NOTE — ED ADULT NURSE REASSESSMENT NOTE - NS ED NURSE REASSESS COMMENT FT1
Rash noted under left breast area, MD notified and nystatin cream applied.  Lower extremities are swollen, scaly, and toenails are thickened and overgrown.  VS are abnormal.  , /86, and temp 11.3 F orally.  Dr. Kim notified, prescribing Tylenol to address fever.  Awaiting xray results.

## 2023-07-11 NOTE — ED ADULT NURSE NOTE - ED STAT RN HANDOFF DETAILS 2
Handoff report given to WOODY Tamayo on 2E by WOODY Juarez. RN made aware of pts current condition/test results/reason for admission.pt is AOx1, resting in stretcher. NADN. pt is on cardiac monitor, pt is on 2L NC sating 96%. pts IV is patent and intact no redness/swelling noted at the site. rounding and safety checks complete. pt is vitally stable upon leaving the ED. any issues endorsed to oncoming RN for followup.

## 2023-07-11 NOTE — ED ADULT NURSE NOTE - CHIEF COMPLAINT QUOTE
Daughter called 911 for general weakness H/O dementia, UTIs yelling out for a hug in triage, awake and responding unable to ans questions

## 2023-07-11 NOTE — H&P ADULT - HISTORY OF PRESENT ILLNESS
Patient is an 83F with a PMH of HTN and dementia (AAOx1 at baseline) who presents to the ED for weakness.  Patient unable to provide history.  Per ED staff, patient noted to have worsening weakness and confusion over the last few days.  Reportedly ambulatory with assistance at baseline but now having difficulty walking.  Patient has a history of pneumonia and UTI as per staff.  Low grade fever in ED, labs show mild leukocytosis.  Will admit to med surg.

## 2023-07-12 PROCEDURE — 99233 SBSQ HOSP IP/OBS HIGH 50: CPT

## 2023-07-12 RX ORDER — HEPARIN SODIUM 5000 [USP'U]/ML
5000 INJECTION INTRAVENOUS; SUBCUTANEOUS EVERY 12 HOURS
Refills: 0 | Status: DISCONTINUED | OUTPATIENT
Start: 2023-07-12 | End: 2023-07-14

## 2023-07-12 RX ORDER — NYSTATIN CREAM 100000 [USP'U]/G
1 CREAM TOPICAL
Refills: 0 | Status: DISCONTINUED | OUTPATIENT
Start: 2023-07-12 | End: 2023-07-14

## 2023-07-12 RX ADMIN — CEFTRIAXONE 100 MILLIGRAM(S): 500 INJECTION, POWDER, FOR SOLUTION INTRAMUSCULAR; INTRAVENOUS at 06:29

## 2023-07-12 RX ADMIN — Medication 25 MILLIGRAM(S): at 06:31

## 2023-07-12 RX ADMIN — NYSTATIN CREAM 1 APPLICATION(S): 100000 CREAM TOPICAL at 17:05

## 2023-07-12 RX ADMIN — HEPARIN SODIUM 5000 UNIT(S): 5000 INJECTION INTRAVENOUS; SUBCUTANEOUS at 18:33

## 2023-07-12 RX ADMIN — Medication 25 MILLIGRAM(S): at 17:04

## 2023-07-12 NOTE — PHYSICAL THERAPY INITIAL EVALUATION ADULT - GENERAL OBSERVATIONS, REHAB EVAL
Pt recd supine NAD. A x O name, disoriented to place, time situation. No c/o pain at rest but with repositioning in bed c/o neck pain. +confusion. Pt  easily gets upset  so requested RN Carmen to assist .Pt needed maxA x2 for sup-sit .Pt starts hitting therapist or using curse language  due to confusion when therapist & nurse  were assisting  her to get OOB..

## 2023-07-12 NOTE — PATIENT PROFILE ADULT - PUBLIC BENEFITS
[FreeTextEntry1] : ARELIS\par The pathophysiology as well as medical implications of untreated obstructive sleep apnea syndrome were discussed with this patient. Treatment options including CPAP therapy, Inspire,  mandibular advancement device and weight loss were also discussed.\par  no

## 2023-07-12 NOTE — PHYSICAL THERAPY INITIAL EVALUATION ADULT - LEVEL OF INDEPENDENCE: SIT/STAND, REHAB EVAL
maximum assist (25% patients effort) no loss of consciousness, no gait abnormality, no headache, no sensory deficits, and no weakness.

## 2023-07-12 NOTE — PATIENT PROFILE ADULT - FALL HARM RISK - HARM RISK INTERVENTIONS

## 2023-07-12 NOTE — PHYSICAL THERAPY INITIAL EVALUATION ADULT - ADDITIONAL COMMENTS
Pt confused, unable to provide Social hx & Prior level of function    Contacted [facility] [phone] but the bell kept ringing ,no voicemail  Reviewed social hx from 2019(OT eval)+ 2021(PT eval) - At that time pt lived alone & ambulated with SAC.

## 2023-07-12 NOTE — PHYSICAL THERAPY INITIAL EVALUATION ADULT - PHYSICAL ASSIST/NONPHYSICAL ASSIST: GAIT, REHAB EVAL
In the first trialpt ambulated 3 steps c RW c MAx A 2. In the 2nd trial, pt ambulated  20ft with RW c min-Mod Ax1. Further walking limited by fatigue./1 person assist/2 person assist

## 2023-07-12 NOTE — PHYSICAL THERAPY INITIAL EVALUATION ADULT - GAIT TRAINING, PT EVAL
swelling of ankles
Pt will be able to ambulate using assistive device up to 100 ft or more, be able to negotiate 6 steps safely observing proper gait, posture and prevent falls.

## 2023-07-13 ENCOUNTER — TRANSCRIPTION ENCOUNTER (OUTPATIENT)
Age: 83
End: 2023-07-13

## 2023-07-13 LAB
ALBUMIN SERPL ELPH-MCNC: 2.2 G/DL — LOW (ref 3.3–5)
ALP SERPL-CCNC: 54 U/L — SIGNIFICANT CHANGE UP (ref 40–120)
ALT FLD-CCNC: 56 U/L — SIGNIFICANT CHANGE UP (ref 12–78)
ANION GAP SERPL CALC-SCNC: 6 MMOL/L — SIGNIFICANT CHANGE UP (ref 5–17)
AST SERPL-CCNC: 24 U/L — SIGNIFICANT CHANGE UP (ref 15–37)
BASOPHILS # BLD AUTO: 0.04 K/UL — SIGNIFICANT CHANGE UP (ref 0–0.2)
BASOPHILS NFR BLD AUTO: 0.4 % — SIGNIFICANT CHANGE UP (ref 0–2)
BILIRUB SERPL-MCNC: 0.5 MG/DL — SIGNIFICANT CHANGE UP (ref 0.2–1.2)
BUN SERPL-MCNC: 15 MG/DL — SIGNIFICANT CHANGE UP (ref 7–23)
CALCIUM SERPL-MCNC: 8.5 MG/DL — SIGNIFICANT CHANGE UP (ref 8.5–10.1)
CHLORIDE SERPL-SCNC: 101 MMOL/L — SIGNIFICANT CHANGE UP (ref 96–108)
CO2 SERPL-SCNC: 31 MMOL/L — SIGNIFICANT CHANGE UP (ref 22–31)
CREAT SERPL-MCNC: 0.54 MG/DL — SIGNIFICANT CHANGE UP (ref 0.5–1.3)
EGFR: 90 ML/MIN/1.73M2 — SIGNIFICANT CHANGE UP
EOSINOPHIL # BLD AUTO: 0.35 K/UL — SIGNIFICANT CHANGE UP (ref 0–0.5)
EOSINOPHIL NFR BLD AUTO: 3.5 % — SIGNIFICANT CHANGE UP (ref 0–6)
GLUCOSE SERPL-MCNC: 106 MG/DL — HIGH (ref 70–99)
HCT VFR BLD CALC: 45.3 % — HIGH (ref 34.5–45)
HGB BLD-MCNC: 14.7 G/DL — SIGNIFICANT CHANGE UP (ref 11.5–15.5)
IMM GRANULOCYTES NFR BLD AUTO: 0.9 % — SIGNIFICANT CHANGE UP (ref 0–0.9)
LYMPHOCYTES # BLD AUTO: 1.66 K/UL — SIGNIFICANT CHANGE UP (ref 1–3.3)
LYMPHOCYTES # BLD AUTO: 16.5 % — SIGNIFICANT CHANGE UP (ref 13–44)
MCHC RBC-ENTMCNC: 29.9 PG — SIGNIFICANT CHANGE UP (ref 27–34)
MCHC RBC-ENTMCNC: 32 G/DL — SIGNIFICANT CHANGE UP (ref 32–36)
MCV RBC AUTO: 93.4 FL — SIGNIFICANT CHANGE UP (ref 80–100)
MONOCYTES # BLD AUTO: 1.1 K/UL — HIGH (ref 0–0.9)
MONOCYTES NFR BLD AUTO: 10.9 % — SIGNIFICANT CHANGE UP (ref 2–14)
NEUTROPHILS # BLD AUTO: 6.84 K/UL — SIGNIFICANT CHANGE UP (ref 1.8–7.4)
NEUTROPHILS NFR BLD AUTO: 67.8 % — SIGNIFICANT CHANGE UP (ref 43–77)
NRBC # BLD: 0 /100 WBCS — SIGNIFICANT CHANGE UP (ref 0–0)
PLATELET # BLD AUTO: 206 K/UL — SIGNIFICANT CHANGE UP (ref 150–400)
POTASSIUM SERPL-MCNC: 3.5 MMOL/L — SIGNIFICANT CHANGE UP (ref 3.5–5.3)
POTASSIUM SERPL-SCNC: 3.5 MMOL/L — SIGNIFICANT CHANGE UP (ref 3.5–5.3)
PROT SERPL-MCNC: 6 GM/DL — SIGNIFICANT CHANGE UP (ref 6–8.3)
RBC # BLD: 4.85 M/UL — SIGNIFICANT CHANGE UP (ref 3.8–5.2)
RBC # FLD: 13.1 % — SIGNIFICANT CHANGE UP (ref 10.3–14.5)
SODIUM SERPL-SCNC: 138 MMOL/L — SIGNIFICANT CHANGE UP (ref 135–145)
WBC # BLD: 10.08 K/UL — SIGNIFICANT CHANGE UP (ref 3.8–10.5)
WBC # FLD AUTO: 10.08 K/UL — SIGNIFICANT CHANGE UP (ref 3.8–10.5)

## 2023-07-13 PROCEDURE — 99233 SBSQ HOSP IP/OBS HIGH 50: CPT

## 2023-07-13 RX ORDER — POTASSIUM CHLORIDE 20 MEQ
40 PACKET (EA) ORAL ONCE
Refills: 0 | Status: DISCONTINUED | OUTPATIENT
Start: 2023-07-13 | End: 2023-07-13

## 2023-07-13 RX ORDER — POTASSIUM CHLORIDE 20 MEQ
40 PACKET (EA) ORAL ONCE
Refills: 0 | Status: COMPLETED | OUTPATIENT
Start: 2023-07-13 | End: 2023-07-13

## 2023-07-13 RX ADMIN — NYSTATIN CREAM 1 APPLICATION(S): 100000 CREAM TOPICAL at 17:47

## 2023-07-13 RX ADMIN — Medication 25 MILLIGRAM(S): at 17:46

## 2023-07-13 RX ADMIN — CEFTRIAXONE 100 MILLIGRAM(S): 500 INJECTION, POWDER, FOR SOLUTION INTRAMUSCULAR; INTRAVENOUS at 05:41

## 2023-07-13 RX ADMIN — HEPARIN SODIUM 5000 UNIT(S): 5000 INJECTION INTRAVENOUS; SUBCUTANEOUS at 05:41

## 2023-07-13 RX ADMIN — Medication 25 MILLIGRAM(S): at 05:41

## 2023-07-13 RX ADMIN — NYSTATIN CREAM 1 APPLICATION(S): 100000 CREAM TOPICAL at 05:41

## 2023-07-13 RX ADMIN — HEPARIN SODIUM 5000 UNIT(S): 5000 INJECTION INTRAVENOUS; SUBCUTANEOUS at 17:46

## 2023-07-13 RX ADMIN — Medication 40 MILLIEQUIVALENT(S): at 18:50

## 2023-07-13 NOTE — PROGRESS NOTE ADULT - PROBLEM SELECTOR PLAN 1
Started on ceftriaxone in the ED, will continue.  Follow blood cultures - de-escalate antibiotics as needed   CXR without infiltrates (unofficially), UA wnl
Started on ceftriaxone in the ED, will continue.  Follow blood cultures - de-escalate antibiotics as needed   CXR without infiltrates (unofficially), UA wnl

## 2023-07-13 NOTE — PROGRESS NOTE ADULT - ASSESSMENT
Patient is an 83F with a PMH of HTN and dementia (AAOx1 at baseline) who presents to the ED for weakness.      Sepsis 2/2 UTI  - presented with weakness  - wbc 12, increase RR on admission  - UA positive for LE, blood  - Ucx positive for GNR  - C/w Rocephin. will de-escalate as needed  - f/u PT eval    Essential HTN  - well control  - C/w metoprol tart 25mg BID          Preventative Measures   SCD and heparin-dvt ppx  fall precautions   
Patient is an 83F with a PMH of HTN and dementia (AAOx1 at baseline) who presents to the ED for weakness.      Sepsis 2/2 UTI  - presented with weakness  -wbc 12, increase RR on admission  - UA positive for LE, blood  - C/w Rocephin. will de-escalate as needed  - f/u PT eval    Essential HTN  - well control  - C/w metoprol tart 25mg BID          Preventative Measures   SCD and heparin-dvt ppx  fall precautions

## 2023-07-13 NOTE — PROGRESS NOTE ADULT - SUBJECTIVE AND OBJECTIVE BOX
Patient is a 83y old  Female who presents with a chief complaint of sepsis, weakness (2023 18:12)      INTERVAL HPI/OVERNIGHT EVENTS:   no acute events overnight  AoX1 but denies any complaints    MEDICATIONS  (STANDING):  cefTRIAXone   IVPB 1000 milliGRAM(s) IV Intermittent every 24 hours  metoprolol tartrate 25 milliGRAM(s) Oral two times a day  nystatin Powder 1 Application(s) Topical two times a day    MEDICATIONS  (PRN):      Allergies    No Known Allergies    Intolerances        REVIEW OF SYSTEMS:  CONSTITUTIONAL: No fever, weight loss, or fatigue  EYES: No eye pain, visual disturbances, or discharge  ENMT:  No difficulty hearing, tinnitus, vertigo; No sinus or throat pain  NECK: No pain or stiffness  BREASTS: No pain, masses, or nipple discharge  RESPIRATORY: No cough, wheezing, chills or hemoptysis; No shortness of breath  CARDIOVASCULAR: No chest pain, palpitations, dizziness, or leg swelling  GASTROINTESTINAL: No abdominal or epigastric pain. No nausea, vomiting, or hematemesis; No diarrhea or constipation. No melena or hematochezia.  GENITOURINARY: No dysuria, frequency, hematuria, or incontinence  NEUROLOGICAL: No headaches, memory loss, loss of strength, numbness, or tremors  SKIN: No itching, burning, rashes, or lesions   LYMPH NODES: No enlarged glands  ENDOCRINE: No heat or cold intolerance; No hair loss  MUSCULOSKELETAL: No joint pain or swelling; No muscle, back, or extremity pain  PSYCHIATRIC: No depression, anxiety, mood swings, or difficulty sleeping  HEME/LYMPH: No easy bruising, or bleeding gums  ALLERGY AND IMMUNOLOGIC: No hives or eczema    Vital Signs Last 24 Hrs  T(C): 36.8 (2023 11:59), Max: 37.9 (2023 17:29)  T(F): 98.3 (2023 11:59), Max: 100.3 (2023 17:29)  HR: 79 (2023 11:59) (72 - 105)  BP: 120/64 (2023 11:59) (118/89 - 192/86)  BP(mean): --  RR: 18 (2023 11:59) (18 - 27)  SpO2: 94% (2023 11:59) (94% - 100%)    Parameters below as of 2023 10:40  Patient On (Oxygen Delivery Method): nasal cannula  O2 Flow (L/min): 2      PHYSICAL EXAM:  GENERAL: NAD,  no increased WOB  HEAD:  Atraumatic, Normocephalic,   EYES: EOMI, PERRLA, conjunctiva and sclera clear  ENMT: Moist mucous membranes  NECK: Supple, No JVD  NERVOUS SYSTEM:  Alert & Oriented X1, no focal neuro deficits   CHEST/LUNG: Clear to auscultation bilaterally; No rales, rhonchi, wheezing, or rubs  HEART: Regular rate and rhythm; No murmurs, rubs, or gallops  ABDOMEN: Soft, Nontender, Nondistended; Bowel sounds present  EXTREMITIES:  2+ Peripheral Pulses b/l, No clubbing, cyanosis, calf tenderness. +1 pitting edema    LABS:                        18.1   12.08 )-----------( 209      ( 2023 15:00 )             54.3     07-11    137  |  101  |  12  ----------------------------<  137<H>  4.2   |  30  |  0.75    Ca    9.1      2023 15:00    TPro  8.1  /  Alb  3.1<L>  /  TBili  1.4<H>  /  DBili  x   /  AST  84<H>  /  ALT  104<H>  /  AlkPhos  78  07-11      Urinalysis Basic - ( 2023 16:00 )    Color: Yellow / Appearance: Clear / S.010 / pH: x  Gluc: x / Ketone: Negative  / Bili: Negative / Urobili: 4 mg/dL   Blood: x / Protein: 15 mg/dL / Nitrite: Negative   Leuk Esterase: Small / RBC: 3-5 /HPF / WBC 3-5   Sq Epi: x / Non Sq Epi: x / Bacteria: Many      CAPILLARY BLOOD GLUCOSE            RADIOLOGY & ADDITIONAL TESTS:  < from: CT Head No Cont (23 @ 19:47) >  IMPRESSIONS:    HEAD/CERVICAL CT: No acute intracranial hemorrhage.  No acute fracture cervical spine.      Chest X-ray:  new small linear atelectasesbilaterally     --- End of Report ---      Radiology reports read and imaging reviewed  :  [ x] YES  [ ] NO  (I am not a radiologist and therefore rely on Radiologist reports to facilitate with diagnosis and treatment plans)      Care Discussed with Consultants/Other Providers [x ] YES  [ ] NO  Care plan and all findings were discussed in detail with patient.  All questions and concerns addressed.    
Patient is a 83y old  Female who presents with a chief complaint of sepsis, weakness (11 Jul 2023 18:12)      INTERVAL HPI/OVERNIGHT EVENTS:   no acute events overnight  AoX1 but denies any complaints      MEDICATIONS  (STANDING):  cefTRIAXone   IVPB 1000 milliGRAM(s) IV Intermittent every 24 hours  heparin   Injectable 5000 Unit(s) SubCutaneous every 12 hours  metoprolol tartrate 25 milliGRAM(s) Oral two times a day  nystatin Powder 1 Application(s) Topical two times a day        MEDICATIONS  (PRN):      Allergies    No Known Allergies    Intolerances            VITALS  Vital Signs Last 24 Hrs  T(C): 37.3 (13 Jul 2023 11:27), Max: 37.7 (12 Jul 2023 23:40)  T(F): 99.1 (13 Jul 2023 11:27), Max: 99.9 (12 Jul 2023 23:40)  HR: 90 (13 Jul 2023 11:27) (86 - 99)  BP: 154/83 (13 Jul 2023 11:27) (143/79 - 157/90)  BP(mean): --  RR: 18 (13 Jul 2023 11:27) (18 - 18)  SpO2: 95% (13 Jul 2023 11:27) (93% - 95%)    Parameters below as of 13 Jul 2023 11:27  Patient On (Oxygen Delivery Method): nasal cannula              PHYSICAL EXAM:  GENERAL: NAD,  no increased WOB  HEAD:  Atraumatic, Normocephalic,   EYES: EOMI, PERRLA, conjunctiva and sclera clear  ENMT: Moist mucous membranes  NECK: Supple, No JVD  NERVOUS SYSTEM:  Alert & Oriented X1, no focal neuro deficits   CHEST/LUNG: Clear to auscultation bilaterally; No rales, rhonchi, wheezing, or rubs  HEART: Regular rate and rhythm; No murmurs, rubs, or gallops  ABDOMEN: Soft, Nontender, Nondistended; Bowel sounds present  EXTREMITIES:  2+ Peripheral Pulses b/l, No clubbing, cyanosis, calf tenderness. +1 pitting edema    LABS:                        14.7   10.08 )-----------( 206      ( 13 Jul 2023 06:39 )             45.3     07-13    138  |  101  |  15  ----------------------------<  106<H>  3.5   |  31  |  0.54    Ca    8.5      13 Jul 2023 06:39    TPro  6.0  /  Alb  2.2<L>  /  TBili  0.5  /  DBili  x   /  AST  24  /  ALT  56  /  AlkPhos  54  07-13    LIVER FUNCTIONS - ( 13 Jul 2023 06:39 )  Alb: 2.2 g/dL / Pro: 6.0 gm/dL / ALK PHOS: 54 U/L / ALT: 56 U/L / AST: 24 U/L / GGT: x             Urinalysis Basic - ( 13 Jul 2023 06:39 )    Color: x / Appearance: x / SG: x / pH: x  Gluc: 106 mg/dL / Ketone: x  / Bili: x / Urobili: x   Blood: x / Protein: x / Nitrite: x   Leuk Esterase: x / RBC: x / WBC x   Sq Epi: x / Non Sq Epi: x / Bacteria: x        Urine Cx    >100,000 CFU/ml Gram Negative Rods (07.11.23 @ 16:00)       CAPILLARY BLOOD GLUCOSE            RADIOLOGY & ADDITIONAL TESTS:  < from: CT Head No Cont (07.09.23 @ 19:47) >  IMPRESSIONS:    HEAD/CERVICAL CT: No acute intracranial hemorrhage.  No acute fracture cervical spine.      Chest X-ray:  new small linear atelectasesbilaterally     --- End of Report ---      Radiology reports read and imaging reviewed  :  [ x] YES  [ ] NO  (I am not a radiologist and therefore rely on Radiologist reports to facilitate with diagnosis and treatment plans)      Care Discussed with Consultants/Other Providers [x ] YES  [ ] NO  Care plan and all findings were discussed in detail with patient.  All questions and concerns addressed.

## 2023-07-14 ENCOUNTER — TRANSCRIPTION ENCOUNTER (OUTPATIENT)
Age: 83
End: 2023-07-14

## 2023-07-14 VITALS
TEMPERATURE: 99 F | SYSTOLIC BLOOD PRESSURE: 134 MMHG | DIASTOLIC BLOOD PRESSURE: 84 MMHG | HEART RATE: 101 BPM | RESPIRATION RATE: 18 BRPM | OXYGEN SATURATION: 94 %

## 2023-07-14 DIAGNOSIS — N39.0 URINARY TRACT INFECTION, SITE NOT SPECIFIED: ICD-10-CM

## 2023-07-14 LAB
-  AMIKACIN: SIGNIFICANT CHANGE UP
-  AMOXICILLIN/CLAVULANIC ACID: SIGNIFICANT CHANGE UP
-  AMPICILLIN/SULBACTAM: SIGNIFICANT CHANGE UP
-  AMPICILLIN: SIGNIFICANT CHANGE UP
-  AZTREONAM: SIGNIFICANT CHANGE UP
-  CEFAZOLIN: SIGNIFICANT CHANGE UP
-  CEFEPIME: SIGNIFICANT CHANGE UP
-  CEFOXITIN: SIGNIFICANT CHANGE UP
-  CEFTRIAXONE: SIGNIFICANT CHANGE UP
-  CIPROFLOXACIN: SIGNIFICANT CHANGE UP
-  ERTAPENEM: SIGNIFICANT CHANGE UP
-  GENTAMICIN: SIGNIFICANT CHANGE UP
-  IMIPENEM: SIGNIFICANT CHANGE UP
-  LEVOFLOXACIN: SIGNIFICANT CHANGE UP
-  MEROPENEM: SIGNIFICANT CHANGE UP
-  NITROFURANTOIN: SIGNIFICANT CHANGE UP
-  PIPERACILLIN/TAZOBACTAM: SIGNIFICANT CHANGE UP
-  TOBRAMYCIN: SIGNIFICANT CHANGE UP
-  TRIMETHOPRIM/SULFAMETHOXAZOLE: SIGNIFICANT CHANGE UP
CULTURE RESULTS: SIGNIFICANT CHANGE UP
METHOD TYPE: SIGNIFICANT CHANGE UP
ORGANISM # SPEC MICROSCOPIC CNT: SIGNIFICANT CHANGE UP
ORGANISM # SPEC MICROSCOPIC CNT: SIGNIFICANT CHANGE UP
SPECIMEN SOURCE: SIGNIFICANT CHANGE UP

## 2023-07-14 PROCEDURE — 99239 HOSP IP/OBS DSCHRG MGMT >30: CPT

## 2023-07-14 RX ORDER — CEFDINIR 250 MG/5ML
1 POWDER, FOR SUSPENSION ORAL
Qty: 8 | Refills: 0
Start: 2023-07-14

## 2023-07-14 RX ADMIN — CEFTRIAXONE 100 MILLIGRAM(S): 500 INJECTION, POWDER, FOR SOLUTION INTRAMUSCULAR; INTRAVENOUS at 06:07

## 2023-07-14 RX ADMIN — HEPARIN SODIUM 5000 UNIT(S): 5000 INJECTION INTRAVENOUS; SUBCUTANEOUS at 06:07

## 2023-07-14 RX ADMIN — Medication 25 MILLIGRAM(S): at 16:27

## 2023-07-14 RX ADMIN — Medication 25 MILLIGRAM(S): at 06:07

## 2023-07-14 RX ADMIN — NYSTATIN CREAM 1 APPLICATION(S): 100000 CREAM TOPICAL at 06:14

## 2023-07-14 NOTE — DISCHARGE NOTE NURSING/CASE MANAGEMENT/SOCIAL WORK - NSDCPEFALRISK_GEN_ALL_CORE
For information on Fall & Injury Prevention, visit: [email or web address] OR  [email or web address] OR  https://www.cdc.gov/steadi/patient.html
For information on Fall & Injury Prevention, visit: [email or web address] OR  [email or web address] OR  https://www.cdc.gov/steadi/patient.html

## 2023-07-14 NOTE — DISCHARGE NOTE PROVIDER - NSFOLLOWUPCLINICS_GEN_ALL_ED_FT
[facility]  Primary Care  [address] [place] [address]  Phone: [phone]  Fax:

## 2023-07-14 NOTE — DISCHARGE NOTE NURSING/CASE MANAGEMENT/SOCIAL WORK - NSDCPETBCESMAN_GEN_ALL_CORE
The atrial lead was positioned If you are a smoker, it is important for your health to stop smoking. Please be aware that second hand smoke is also harmful.

## 2023-07-14 NOTE — DISCHARGE NOTE PROVIDER - NSDCMRMEDTOKEN_GEN_ALL_CORE_FT
cefdinir 300 mg oral capsule: 1 cap(s) orally 2 times a day  metoprolol tartrate 25 mg oral tablet: 1 tab(s) orally 2 times a day

## 2023-07-14 NOTE — DISCHARGE NOTE PROVIDER - ATTENDING DISCHARGE PHYSICAL EXAMINATION:
GENERAL: NAD,  no increased WOB  HEAD:  Atraumatic, Normocephalic,   EYES: EOMI, PERRLA, conjunctiva and sclera clear  ENMT: Moist mucous membranes  NECK: Supple, No JVD  NERVOUS SYSTEM:  Alert & Oriented X1, no focal neuro deficits   CHEST/LUNG: Clear to auscultation bilaterally; No rales, rhonchi, wheezing, or rubs  HEART: Regular rate and rhythm; No murmurs, rubs, or gallops  ABDOMEN: Soft, Nontender, Nondistended; Bowel sounds present  EXTREMITIES:  2+ Peripheral Pulses b/l, No clubbing,

## 2023-07-14 NOTE — DISCHARGE NOTE PROVIDER - NSDCFUADDINST_GEN_ALL_CORE_FT
Start on Cefdinir 300mg, Take one tablet by mouth twice daily for 4 days. Start on 7/15/23    Continue with home metoprolol

## 2023-07-14 NOTE — DISCHARGE NOTE PROVIDER - HOSPITAL COURSE
83F with a PMH of HTN and dementia (AAOx1 at baseline) who presents to the ED for weakness.  Pt was admitted to [facility] for UTI. CT Brain/Cervical negative for acute fracture hemorrhage. CXR showed linear atelectasis however no consolidation. Pt was started on Rocephin. Ucx positive for kleb variiola. physical therapy recommended ANDREW. Pt remained hemodynamically stable and deemed stable for discharge to rehab facility. Pt to continue on cefdinir 300mg BID for 4 more days to complete abx course.

## 2023-07-14 NOTE — DISCHARGE NOTE NURSING/CASE MANAGEMENT/SOCIAL WORK - NSDCVIVACCINE_GEN_ALL_CORE_FT
COVID-19, mRNA, LNP-S, PF, 30 mcg/0.3 mL dose (Pfizer); 08-Oct-2021 16:24; Crystal Samuel (RN); [employer]; [id number] (Exp. Date: 11-Jan-2021); IntraMuscular; Deltoid Left.; 0.3 milliLiter(s);   Tdap; 03-May-2019 11:04; Kathie Christianson (WOODY); [employer]; [id number] (Exp. Date: 12-Mar-2021); IntraMuscular; Deltoid Right.; 0.5 milliLiter(s); VIS (VIS Published: 09-May-2013, VIS Presented: 03-May-2019);   
COVID-19, mRNA, LNP-S, PF, 30 mcg/0.3 mL dose (Pfizer); 08-Oct-2021 16:24; Crystal Samuel (RN); [employer]; [id number] (Exp. Date: 11-Jan-2021); IntraMuscular; Deltoid Left.; 0.3 milliLiter(s);   Tdap; 03-May-2019 11:04; Kathie Christianson (WOODY); [employer]; [id number] (Exp. Date: 12-Mar-2021); IntraMuscular; Deltoid Right.; 0.5 milliLiter(s); VIS (VIS Published: 09-May-2013, VIS Presented: 03-May-2019);

## 2023-07-14 NOTE — DISCHARGE NOTE NURSING/CASE MANAGEMENT/SOCIAL WORK - PATIENT PORTAL LINK FT
You can access the FollowMyHealth Patient Portal offered by [facility] by registering at the following website: [email or web address]. By joining [employer]’s FollowMyHealth portal, you will also be able to view your health information using other applications (apps) compatible with our system.
You can access the FollowMyHealth Patient Portal offered by [facility] by registering at the following website: [email or web address]. By joining [employer]’s FollowMyHealth portal, you will also be able to view your health information using other applications (apps) compatible with our system.

## 2023-07-20 DIAGNOSIS — G30.9 ALZHEIMER'S DISEASE, UNSPECIFIED: ICD-10-CM

## 2023-07-20 DIAGNOSIS — Z79.899 OTHER LONG TERM (CURRENT) DRUG THERAPY: ICD-10-CM

## 2023-07-20 DIAGNOSIS — Z90.12 ACQUIRED ABSENCE OF LEFT BREAST AND NIPPLE: ICD-10-CM

## 2023-07-20 DIAGNOSIS — Z85.3 PERSONAL HISTORY OF MALIGNANT NEOPLASM OF BREAST: ICD-10-CM

## 2023-07-20 DIAGNOSIS — I10 ESSENTIAL (PRIMARY) HYPERTENSION: ICD-10-CM

## 2023-07-20 DIAGNOSIS — N39.0 URINARY TRACT INFECTION, SITE NOT SPECIFIED: ICD-10-CM

## 2023-07-20 DIAGNOSIS — J98.11 ATELECTASIS: ICD-10-CM

## 2023-07-20 DIAGNOSIS — F02.80 DEMENTIA IN OTHER DISEASES CLASSIFIED ELSEWHERE, UNSPECIFIED SEVERITY, WITHOUT BEHAVIORAL DISTURBANCE, PSYCHOTIC DISTURBANCE, MOOD DISTURBANCE, AND ANXIETY: ICD-10-CM

## 2023-07-20 DIAGNOSIS — A41.59 OTHER GRAM-NEGATIVE SEPSIS: ICD-10-CM

## 2023-07-20 DIAGNOSIS — R53.1 WEAKNESS: ICD-10-CM

## 2023-09-13 NOTE — ED ADULT NURSE NOTE - ED CARDIAC CAPILLARY REFILL
[phone]. 2 seconds or less

## 2024-05-15 NOTE — PHYSICAL THERAPY INITIAL EVALUATION ADULT - BED MOBILITY LIMITATIONS, REHAB EVAL
----- Message from Chris Reyes MD sent at 5/15/2024 11:15 AM CDT -----  I left him a message that his LDL is gotten worse and questioned whether he is taking his Crestor if he is he needs to increase the dose and repeat it in 6 weeks if he isn't taking it he needs to  
The pt called back and stated that he is currently on Rosuvastatin 5 mg. He was informed that Dr. Reyes wanted him to go on Rosuvastatin 10 mg then to re-check his cholesterol in 6 weeks. The pt agreed and and the Rosuvastatin 10 mg was sent to the [facility] in [place] per his request. The pt also set-up a lab appointment and he had no other questions at time.   
decreased ability to use arms for pushing/pulling/decreased ability to use legs for bridging/pushing/impaired ability to control trunk for mobility

## 2024-12-03 NOTE — PHYSICAL THERAPY INITIAL EVALUATION ADULT - SITTING BALANCE: STATIC
Primary RN SBAR: Kael Barahona RN  Incapacitated Nurse [facility]. provided: yes  Patient Education provided: keep   Preferred Education method and Primary language: verbal/ English  Hospital General Consent Verified: yes  Hospital associated wait time; reason: 45min faulty heater on machine and switch mahcine out; 34min transport to suite; 23min back to room  Hep Bs drawn, Out Of Date in EPIC  Pre-HD   12/03/24 0953   Observations & Evaluations   Level of Consciousness 0   Oriented X 4   Heart Rhythm Regular   Respiratory Quality/Effort Unlabored   O2 Device None (Room air)   Bilateral Breath Sounds Clear;Diminished   Edema Right lower extremity;Left lower extremity   RLE Edema Trace   LLE Edema Trace   Vital Signs   BP (!) 156/88   Temp 98.2 °F (36.8 °C)   Pulse 63   Respirations 16   Pain Assessment   Pain Assessment 0-10   Pain Level 6   Technical Checks   Dialysis Machine No. 02   RO Machine Number [id number]   Dialyzer Lot No. [id number]   Tubing Lot Number [id number]   All Connections Secure Yes   NS Bag Yes   Saline Line Double Clamped Yes   Dialyzer Nipro ELISIO   Prime Volume (mL) 200 mL   ICEBOAT I;C;E;B;O;A;T   RO Machine Log Sheet Completed Yes   Machine Alarm Self Test Completed;Passed   Air Foam Detector Tested;Proper Function   Extracorporeal Circuit Tested for Integrity Yes   Machine Conductivity 13.9   Machine Ph 7.4   Bleach Test (Neg) Yes   Bath Temperature 98.6 °F (37 °C)   Dialysis Bath   K+ (Potassium) 2   Ca+ (Calcium) 2.5   Na+ (Sodium) 138   HCO3 (Bicarb) 40     Start of HD     12/03/24 0958   Treatment   Time On 0958   Treatment Goal 2.5kg in 3.5hr   Vital Signs   BP (!) 144/82   Temp 98.2 °F (36.8 °C)   Pulse 60   Respirations 16   Hemodialysis Central Access - Permanent/Tunneled Right Subclavian   No placement date or time found.   Present on Admission/Arrival: Yes  Orientation: Right  Access Location: Subclavian   Continued need for line? Yes   Site Assessment Clean, dry & intact   Blue Lumen Status Brisk  good balance

## 2025-01-01 ENCOUNTER — INPATIENT (INPATIENT)
Facility: HOSPITAL | Age: 85
LOS: 6 days | Discharge: NURSING, REHAB OR LONG-TERM CARE | End: 2025-01-08
Attending: INTERNAL MEDICINE | Admitting: INTERNAL MEDICINE
Payer: MEDICARE

## 2025-01-01 VITALS
TEMPERATURE: 99 F | RESPIRATION RATE: 22 BRPM | HEART RATE: 119 BPM | OXYGEN SATURATION: 93 % | DIASTOLIC BLOOD PRESSURE: 82 MMHG | SYSTOLIC BLOOD PRESSURE: 148 MMHG | WEIGHT: 183.2 LBS

## 2025-01-01 DIAGNOSIS — Z90.12 ACQUIRED ABSENCE OF LEFT BREAST AND NIPPLE: Chronic | ICD-10-CM

## 2025-01-01 DIAGNOSIS — F03.90 UNSPECIFIED DEMENTIA, UNSPECIFIED SEVERITY, WITHOUT BEHAVIORAL DISTURBANCE, PSYCHOTIC DISTURBANCE, MOOD DISTURBANCE, AND ANXIETY: ICD-10-CM

## 2025-01-01 DIAGNOSIS — Z29.9 ENCOUNTER FOR PROPHYLACTIC MEASURES, UNSPECIFIED: ICD-10-CM

## 2025-01-01 DIAGNOSIS — J96.22 ACUTE AND CHRONIC RESPIRATORY FAILURE WITH HYPERCAPNIA: ICD-10-CM

## 2025-01-01 DIAGNOSIS — N39.0 URINARY TRACT INFECTION, SITE NOT SPECIFIED: ICD-10-CM

## 2025-01-01 DIAGNOSIS — A41.9 SEPSIS, UNSPECIFIED ORGANISM: ICD-10-CM

## 2025-01-01 DIAGNOSIS — J96.01 ACUTE RESPIRATORY FAILURE WITH HYPOXIA: ICD-10-CM

## 2025-01-01 PROBLEM — C50.919 MALIGNANT NEOPLASM OF UNSPECIFIED SITE OF UNSPECIFIED FEMALE BREAST: Chronic | Status: ACTIVE | Noted: 2023-07-11

## 2025-01-01 LAB
ALBUMIN SERPL ELPH-MCNC: 2.9 G/DL — LOW (ref 3.3–5)
ALP SERPL-CCNC: 76 U/L — SIGNIFICANT CHANGE UP (ref 40–120)
ALT FLD-CCNC: 24 U/L — SIGNIFICANT CHANGE UP (ref 12–78)
ANION GAP SERPL CALC-SCNC: 2 MMOL/L — LOW (ref 5–17)
APPEARANCE UR: ABNORMAL
APTT BLD: 31.5 SEC — SIGNIFICANT CHANGE UP (ref 24.5–35.6)
AST SERPL-CCNC: 19 U/L — SIGNIFICANT CHANGE UP (ref 15–37)
BACTERIA # UR AUTO: ABNORMAL /HPF
BASE EXCESS BLDA CALC-SCNC: 11.4 MMOL/L — HIGH (ref -2–3)
BASE EXCESS BLDA CALC-SCNC: 7 MMOL/L — HIGH (ref -2–3)
BASOPHILS # BLD AUTO: 0.05 K/UL — SIGNIFICANT CHANGE UP (ref 0–0.2)
BASOPHILS NFR BLD AUTO: 0.4 % — SIGNIFICANT CHANGE UP (ref 0–2)
BILIRUB SERPL-MCNC: 0.7 MG/DL — SIGNIFICANT CHANGE UP (ref 0.2–1.2)
BILIRUB UR-MCNC: NEGATIVE — SIGNIFICANT CHANGE UP
BLOOD GAS COMMENTS ARTERIAL: SIGNIFICANT CHANGE UP
BLOOD GAS COMMENTS ARTERIAL: SIGNIFICANT CHANGE UP
BUN SERPL-MCNC: 19 MG/DL — SIGNIFICANT CHANGE UP (ref 7–23)
CALCIUM SERPL-MCNC: 9.3 MG/DL — SIGNIFICANT CHANGE UP (ref 8.5–10.1)
CHLORIDE SERPL-SCNC: 104 MMOL/L — SIGNIFICANT CHANGE UP (ref 96–108)
CO2 BLDA-SCNC: 40 MMOL/L — HIGH (ref 19–24)
CO2 BLDA-SCNC: 40 MMOL/L — HIGH (ref 19–24)
CO2 SERPL-SCNC: 37 MMOL/L — HIGH (ref 22–31)
COLOR SPEC: SIGNIFICANT CHANGE UP
CREAT SERPL-MCNC: 0.52 MG/DL — SIGNIFICANT CHANGE UP (ref 0.5–1.3)
DIFF PNL FLD: ABNORMAL
EGFR: 90 ML/MIN/1.73M2 — SIGNIFICANT CHANGE UP
EOSINOPHIL # BLD AUTO: 0.01 K/UL — SIGNIFICANT CHANGE UP (ref 0–0.5)
EOSINOPHIL NFR BLD AUTO: 0.1 % — SIGNIFICANT CHANGE UP (ref 0–6)
EPI CELLS # UR: PRESENT
FLUAV AG NPH QL: SIGNIFICANT CHANGE UP
FLUBV AG NPH QL: SIGNIFICANT CHANGE UP
GAS PNL BLDA: SIGNIFICANT CHANGE UP
GLUCOSE SERPL-MCNC: 157 MG/DL — HIGH (ref 70–99)
GLUCOSE UR QL: NEGATIVE MG/DL — SIGNIFICANT CHANGE UP
HCO3 BLDA-SCNC: 37 MMOL/L — HIGH (ref 21–28)
HCO3 BLDA-SCNC: 38 MMOL/L — HIGH (ref 21–28)
HCT VFR BLD CALC: 50.3 % — HIGH (ref 34.5–45)
HGB BLD-MCNC: 15.5 G/DL — SIGNIFICANT CHANGE UP (ref 11.5–15.5)
HOROWITZ INDEX BLDA+IHG-RTO: 0.7 — SIGNIFICANT CHANGE UP
HOROWITZ INDEX BLDA+IHG-RTO: 80 — SIGNIFICANT CHANGE UP
IMM GRANULOCYTES NFR BLD AUTO: 0.6 % — SIGNIFICANT CHANGE UP (ref 0–0.9)
INR BLD: 1.23 RATIO — HIGH (ref 0.85–1.16)
KETONES UR-MCNC: ABNORMAL MG/DL
LACTATE SERPL-SCNC: 1.3 MMOL/L — SIGNIFICANT CHANGE UP (ref 0.7–2)
LEUKOCYTE ESTERASE UR-ACNC: ABNORMAL
LYMPHOCYTES # BLD AUTO: 0.61 K/UL — LOW (ref 1–3.3)
LYMPHOCYTES # BLD AUTO: 4.8 % — LOW (ref 13–44)
MCHC RBC-ENTMCNC: 29.2 PG — SIGNIFICANT CHANGE UP (ref 27–34)
MCHC RBC-ENTMCNC: 30.8 G/DL — LOW (ref 32–36)
MCV RBC AUTO: 94.9 FL — SIGNIFICANT CHANGE UP (ref 80–100)
MONOCYTES # BLD AUTO: 0.5 K/UL — SIGNIFICANT CHANGE UP (ref 0–0.9)
MONOCYTES NFR BLD AUTO: 4 % — SIGNIFICANT CHANGE UP (ref 2–14)
NEUTROPHILS # BLD AUTO: 11.4 K/UL — HIGH (ref 1.8–7.4)
NEUTROPHILS NFR BLD AUTO: 90.1 % — HIGH (ref 43–77)
NITRITE UR-MCNC: POSITIVE
NRBC # BLD: 0 /100 WBCS — SIGNIFICANT CHANGE UP (ref 0–0)
NT-PROBNP SERPL-SCNC: 1379 PG/ML — HIGH (ref 0–450)
PCO2 BLDA: 59 MMHG — HIGH (ref 32–46)
PCO2 BLDA: 83 MMHG — CRITICAL HIGH (ref 32–46)
PH BLDA: 7.26 — LOW (ref 7.35–7.45)
PH BLDA: 7.42 — SIGNIFICANT CHANGE UP (ref 7.35–7.45)
PH UR: 5.5 — SIGNIFICANT CHANGE UP (ref 5–8)
PLATELET # BLD AUTO: 233 K/UL — SIGNIFICANT CHANGE UP (ref 150–400)
PO2 BLDA: 106 MMHG — SIGNIFICANT CHANGE UP (ref 83–108)
PO2 BLDA: 78 MMHG — LOW (ref 83–108)
POTASSIUM SERPL-MCNC: 4.6 MMOL/L — SIGNIFICANT CHANGE UP (ref 3.5–5.3)
POTASSIUM SERPL-SCNC: 4.6 MMOL/L — SIGNIFICANT CHANGE UP (ref 3.5–5.3)
PROT SERPL-MCNC: 8 GM/DL — SIGNIFICANT CHANGE UP (ref 6–8.3)
PROT UR-MCNC: 100 MG/DL
PROTHROM AB SERPL-ACNC: 14.2 SEC — HIGH (ref 9.9–13.4)
RBC # BLD: 5.3 M/UL — HIGH (ref 3.8–5.2)
RBC # FLD: 14.3 % — SIGNIFICANT CHANGE UP (ref 10.3–14.5)
RBC CASTS # UR COMP ASSIST: 5 /HPF — HIGH (ref 0–4)
RSV RNA NPH QL NAA+NON-PROBE: SIGNIFICANT CHANGE UP
SAO2 % BLDA: 96.6 % — SIGNIFICANT CHANGE UP (ref 94–98)
SAO2 % BLDA: 98.9 % — HIGH (ref 94–98)
SARS-COV-2 RNA SPEC QL NAA+PROBE: SIGNIFICANT CHANGE UP
SODIUM SERPL-SCNC: 143 MMOL/L — SIGNIFICANT CHANGE UP (ref 135–145)
SP GR SPEC: >1.03 — HIGH (ref 1–1.03)
TROPONIN I, HIGH SENSITIVITY RESULT: 31.8 NG/L — SIGNIFICANT CHANGE UP
UROBILINOGEN FLD QL: 1 MG/DL — SIGNIFICANT CHANGE UP (ref 0.2–1)
WBC # BLD: 12.65 K/UL — HIGH (ref 3.8–10.5)
WBC # FLD AUTO: 12.65 K/UL — HIGH (ref 3.8–10.5)
WBC UR QL: 7 /HPF — HIGH (ref 0–5)

## 2025-01-01 PROCEDURE — 93970 EXTREMITY STUDY: CPT | Mod: 26

## 2025-01-01 PROCEDURE — 93010 ELECTROCARDIOGRAM REPORT: CPT | Mod: 76

## 2025-01-01 PROCEDURE — 99223 1ST HOSP IP/OBS HIGH 75: CPT

## 2025-01-01 PROCEDURE — 99285 EMERGENCY DEPT VISIT HI MDM: CPT | Mod: FS

## 2025-01-01 PROCEDURE — 71045 X-RAY EXAM CHEST 1 VIEW: CPT | Mod: 26

## 2025-01-01 RX ORDER — SODIUM CHLORIDE 9 MG/ML
2600 INJECTION, SOLUTION INTRAMUSCULAR; INTRAVENOUS; SUBCUTANEOUS ONCE
Refills: 0 | Status: COMPLETED | OUTPATIENT
Start: 2025-01-01 | End: 2025-01-01

## 2025-01-01 RX ORDER — ENOXAPARIN SODIUM 60 MG/.6ML
40 INJECTION INTRAVENOUS; SUBCUTANEOUS EVERY 24 HOURS
Refills: 0 | Status: DISCONTINUED | OUTPATIENT
Start: 2025-01-01 | End: 2025-01-08

## 2025-01-01 RX ORDER — TRAZODONE HYDROCHLORIDE 150 MG/1
0.5 TABLET ORAL
Refills: 0 | DISCHARGE

## 2025-01-01 RX ORDER — PIPERACILLIN AND TAZOBACTAM 3; .375 G/15ML; G/15ML
3.38 INJECTION, POWDER, LYOPHILIZED, FOR SOLUTION INTRAVENOUS EVERY 8 HOURS
Refills: 0 | Status: DISCONTINUED | OUTPATIENT
Start: 2025-01-02 | End: 2025-01-02

## 2025-01-01 RX ORDER — CEFTRIAXONE SODIUM 1 G/1
1000 INJECTION, POWDER, FOR SOLUTION INTRAMUSCULAR; INTRAVENOUS ONCE
Refills: 0 | Status: COMPLETED | OUTPATIENT
Start: 2025-01-01 | End: 2025-01-01

## 2025-01-01 RX ORDER — PIPERACILLIN AND TAZOBACTAM 3; .375 G/15ML; G/15ML
3.38 INJECTION, POWDER, LYOPHILIZED, FOR SOLUTION INTRAVENOUS ONCE
Refills: 0 | Status: COMPLETED | OUTPATIENT
Start: 2025-01-02 | End: 2025-01-02

## 2025-01-01 RX ORDER — VANCOMYCIN HYDROCHLORIDE 5 G/100ML
1000 INJECTION, POWDER, LYOPHILIZED, FOR SOLUTION INTRAVENOUS ONCE
Refills: 0 | Status: COMPLETED | OUTPATIENT
Start: 2025-01-01 | End: 2025-01-02

## 2025-01-01 RX ORDER — METHYLPREDNISOLONE 4 MG/1
80 TABLET ORAL ONCE
Refills: 0 | Status: COMPLETED | OUTPATIENT
Start: 2025-01-01 | End: 2025-01-01

## 2025-01-01 RX ORDER — PIPERACILLIN AND TAZOBACTAM 3; .375 G/15ML; G/15ML
3.38 INJECTION, POWDER, LYOPHILIZED, FOR SOLUTION INTRAVENOUS ONCE
Refills: 0 | Status: COMPLETED | OUTPATIENT
Start: 2025-01-01 | End: 2025-01-01

## 2025-01-01 RX ORDER — BISACODYL 5 MG
1 TABLET, DELAYED RELEASE (ENTERIC COATED) ORAL
Refills: 0 | DISCHARGE

## 2025-01-01 RX ORDER — MAGNESIUM SULFATE 500 MG/ML
2 INJECTION, SOLUTION INTRAMUSCULAR; INTRAVENOUS ONCE
Refills: 0 | Status: COMPLETED | OUTPATIENT
Start: 2025-01-01 | End: 2025-01-01

## 2025-01-01 RX ORDER — ACETAMINOPHEN 80 MG/.8ML
1000 SOLUTION/ DROPS ORAL ONCE
Refills: 0 | Status: COMPLETED | OUTPATIENT
Start: 2025-01-01 | End: 2025-01-01

## 2025-01-01 RX ORDER — IPRATROPIUM BROMIDE AND ALBUTEROL SULFATE .5; 2.5 MG/3ML; MG/3ML
3 SOLUTION RESPIRATORY (INHALATION)
Refills: 0 | Status: COMPLETED | OUTPATIENT
Start: 2025-01-01 | End: 2025-01-01

## 2025-01-01 RX ADMIN — VANCOMYCIN HYDROCHLORIDE 250 MILLIGRAM(S): 5 INJECTION, POWDER, LYOPHILIZED, FOR SOLUTION INTRAVENOUS at 21:21

## 2025-01-01 RX ADMIN — ACETAMINOPHEN 400 MILLIGRAM(S): 80 SOLUTION/ DROPS ORAL at 15:28

## 2025-01-01 RX ADMIN — SODIUM CHLORIDE 2600 MILLILITER(S): 9 INJECTION, SOLUTION INTRAMUSCULAR; INTRAVENOUS; SUBCUTANEOUS at 15:29

## 2025-01-01 RX ADMIN — SODIUM CHLORIDE 2600 MILLILITER(S): 9 INJECTION, SOLUTION INTRAMUSCULAR; INTRAVENOUS; SUBCUTANEOUS at 17:00

## 2025-01-01 RX ADMIN — PIPERACILLIN AND TAZOBACTAM 200 GRAM(S): 3; .375 INJECTION, POWDER, LYOPHILIZED, FOR SOLUTION INTRAVENOUS at 21:21

## 2025-01-01 RX ADMIN — MAGNESIUM SULFATE 150 GRAM(S): 500 INJECTION, SOLUTION INTRAMUSCULAR; INTRAVENOUS at 15:33

## 2025-01-01 RX ADMIN — MAGNESIUM SULFATE 2 GRAM(S): 500 INJECTION, SOLUTION INTRAMUSCULAR; INTRAVENOUS at 15:53

## 2025-01-01 RX ADMIN — CEFTRIAXONE SODIUM 100 MILLIGRAM(S): 1 INJECTION, POWDER, FOR SOLUTION INTRAMUSCULAR; INTRAVENOUS at 17:14

## 2025-01-01 RX ADMIN — ACETAMINOPHEN 1000 MILLIGRAM(S): 80 SOLUTION/ DROPS ORAL at 17:15

## 2025-01-01 RX ADMIN — IPRATROPIUM BROMIDE AND ALBUTEROL SULFATE 3 MILLILITER(S): .5; 2.5 SOLUTION RESPIRATORY (INHALATION) at 15:33

## 2025-01-01 RX ADMIN — IPRATROPIUM BROMIDE AND ALBUTEROL SULFATE 3 MILLILITER(S): .5; 2.5 SOLUTION RESPIRATORY (INHALATION) at 15:38

## 2025-01-01 RX ADMIN — METHYLPREDNISOLONE 80 MILLIGRAM(S): 4 TABLET ORAL at 21:03

## 2025-01-01 RX ADMIN — IPRATROPIUM BROMIDE AND ALBUTEROL SULFATE 3 MILLILITER(S): .5; 2.5 SOLUTION RESPIRATORY (INHALATION) at 15:32

## 2025-01-01 RX ADMIN — ACETAMINOPHEN 1000 MILLIGRAM(S): 80 SOLUTION/ DROPS ORAL at 15:43

## 2025-01-01 RX ADMIN — CEFTRIAXONE SODIUM 1000 MILLIGRAM(S): 1 INJECTION, POWDER, FOR SOLUTION INTRAMUSCULAR; INTRAVENOUS at 17:44

## 2025-01-01 NOTE — CHART NOTE - NSCHARTNOTEFT_GEN_A_CORE
Called patient's HCP Maria Victoria Mathew . Informed her that patient's CO2 on ABG has improved a bit, but patient still does not have a good mental status (On sternal rub, she have more reaction than prior). Informed her that patient always has chance of vomiting, or choking on her secretion. Called patient's HCP Maria Victoria Bacanapoleon . Informed her that patient's CO2 on ABG has improved a bit, but patient still does not have a good mental status (On sternal rub, she has more reaction than prior). Informed her that patient always has chance of vomiting, or choking on her secretion. If patient ends up aspirating, it could ultimately result in cardiac arrest. She acknowledged the risks and the possibility of death. She is still keeping the same code status with  DNR/DNI with trial of NIV.

## 2025-01-01 NOTE — H&P ADULT - PROBLEM SELECTOR PLAN 1
- P/w temp 101.7, WBC 12.65  - Lactate 1.3  - CXR roughly clear on wet read  - UA positive  - S/p Ceftriaxone  - Will give Zosyn and Vanc  - F/u cultures

## 2025-01-01 NOTE — CHART NOTE - NSCHARTNOTEFT_GEN_A_CORE
Called patient's HCP Maria Victoria Mathew . Informed her that patient is difficult to arouse, and with sternal rub, she flinches only. At this point, next step would be intubation, however patient is DNR/DNI. Informed HCP that patient may pass away if not intubated soon. Another path to take is to pursue comfort measures only, to minimize pain and let nature take its course. She said she will talk to her  for now, and to reach out to her again soon.

## 2025-01-01 NOTE — ED ADULT NURSE NOTE - CHIEF COMPLAINT QUOTE
Sent by [facility] for SOB Duoneb given H/O CHF Dementia Transfer form states solumedrol 40mg Given

## 2025-01-01 NOTE — ED PROVIDER NOTE - NSICDXPASTSURGICALHX_GEN_ALL_CORE_FT
Spoke to Larisa, patients daughter, regarding process for scheduling driving eval at [facility].  Informed daughter that OT office closed at this time, but that RN will fax order and clinical documentation on 03/18/2019.  RN provided daughter with contact information for scheduling, daughter will call to schedule and provide RN with date of eval.      [facility]  [phone] [address] [place] [address]    Task sent to PSRs to obtain fax number and fax note.     PAST SURGICAL HISTORY:  H/O left mastectomy

## 2025-01-01 NOTE — H&P ADULT - PROBLEM SELECTOR PLAN 2
- P/w respiratory distress from facility  - ABG 7.26/83/106/37   (CO2 from 74 -> 83, worsening on AVAPS)  - S/p Duoneb, solumedrol 40mg in the field  - Will give solumedrol 80mg more  - Currently on AVAPS, without good mental status. Further GOC conversation with the family pending

## 2025-01-01 NOTE — H&P ADULT - ASSESSMENT
Patient is a 84F, with PMHx of Dementia, mood disorder, former smoker, recurrent UTIs, breast cancer, who was sent from NH for respiratory distress. Admitted for sepsis, acute respiratory failure with hypoxia and hypercapnia.

## 2025-01-01 NOTE — ED ADULT TRIAGE NOTE - CHIEF COMPLAINT QUOTE
Sent by [facility] for SOB Duoneb given H/O CHF Dementia Transfer form states solumedrol 40mg Given

## 2025-01-01 NOTE — ED ADULT NURSE NOTE - OBJECTIVE STATEMENT
Patient alert and oriented x1. Patient 84 year old female w/ PMH HTN, dementia, breast cancer BIBEMS from [facility] for respiratory distress. Patient was given solumedrol and duonebs in the field. Upon arrival, patient is tachycardic, tachypneic, and having labored breathing. Patient is audibly wheezing. JENNIFER Donovan at bedside. Patient currently on NRB mask and sat 93%. Patient placed on cardiac monitor. Rectal temp 101.7. Patient denying pain, chest pain. Patient alert and oriented x1. Patient 84 year old female w/ PMH HTN, dementia, breast cancer BIBEMS from [facility] for respiratory distress. Patient was given solumedrol and duonebs in the field. Upon arrival, patient is tachycardic, tachypneic, and having labored breathing. Patient is audibly wheezing. JENNIFER Donovan at bedside. Patient currently on NRB mask and sat 93%. Patient placed on cardiac monitor. Rectal temp 101.7. Patient denying pain, chest pain. Patient arrived w/ 24G IV to top of R hand from nursing home, IV removed.

## 2025-01-01 NOTE — ED ADULT NURSE REASSESSMENT NOTE - NS ED NURSE REASSESS COMMENT FT1
Dr. Shahid made aware of patient mental status decreasing. Patient is currently only responsive to pain. As per Dr. Shahid, patient will remain on AVAPs and he will attempt to contact family to notify them of patient's change in status. Patient remains on continuous cardiac monitoring. 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    Late complications of amputation stump     rseolved with further amputation( MRSA then none since 2014)    Lipoma of colon     colonoscopy 2/21/2014    Myocardial infarction     per patient 2000 & 9/2012    Peripheral vascular disease     Phantom limb syndrome     patient reports only intermittent not problematic, not worsening    S/P aorto-bifemoral bypass surgery 3/17/2014    Spinal cord disease     L4L5 disc    Stroke     Tobacco dependence     resolved    Ureteral stent retained        Past Surgical History:   Procedure Laterality Date    ABDOMINAL AORTIC ANEURYSM REPAIR      ABDOMINAL AORTIC ANEURYSM REPAIR  1996/2014    AMPUTATION, LOWER LIMB      AORTA - BILATERAL FEMORAL ARTERY BYPASS GRAFT  2014    Left and right leg    CORONARY ANGIOPLASTY WITH STENT PLACEMENT  2000    Three placed in heart    CYSTOSCOPY W/ RETROGRADES Left 5/29/2018    Procedure: CYSTOSCOPY, WITH RETROGRADE PYELOGRAM;  Surgeon: Scooter Jin IV, MD;  Location: [facility] OR;  Service: Urology;  Laterality: Left;    CYSTOSCOPY W/ URETERAL STENT PLACEMENT Left 5/29/2018    Procedure: CYSTOSCOPY, WITH URETERAL STENT INSERTION;  Surgeon: Scooter Jin IV, MD;  Location: [facility] OR;  Service: Urology;  Laterality: Left;    CYSTOSCOPY W/ URETERAL STENT REMOVAL Left 5/29/2018    Procedure: CYSTOSCOPY, WITH URETERAL STENT REMOVAL;  Surgeon: Scooter Jin IV, MD;  Location:  [facility] OR;  Service: Urology;  Laterality: Left;    FOOT AMPUTATION THROUGH METATARSAL  1996    left    FOOT SURGERY Bilateral 1980's    per patient multiple toe amputations prior to.  partial foot amputation:first great toe then other toes     KIDNEY SURGERY  2014    per patient separation of horseshoe kidney @ time of AAA repair    LEFT HEART CATHETERIZATION Left 3/7/2019    Procedure: CATHETERIZATION, HEART, LEFT;  Surgeon: Adriel Boone MD;  Location: [facility] CATH LAB;  Service: Cardiology;  Laterality: Left;  630 admit for IV hydration  10am start    LUNG LOBECTOMY Right 1970s    per patient not cancer    right below knee amputation  2009 (approx)    SMALL INTESTINE SURGERY  2014    per patient partial @ time of aaa repair  not small bowel - large bowel bowel compromised bythtwe AAAbowel    TONSILLECTOMY  1955 aprox    URETERAL STENT PLACEMENT Left     annually replaced since 2012 or so  Dr Jin       Review of patient's allergies indicates:   Allergen Reactions    Morphine Itching       No current facility-administered medications on file prior to encounter.      Current Outpatient Medications on File Prior to Encounter   Medication Sig    amLODIPine (NORVASC) 10 MG tablet TAKE 1 TABLET EVERY DAY    aspirin (ECOTRIN) 81 MG EC tablet Take 1 tablet (81 mg total) by mouth once daily.    carvedilol (COREG) 25 MG tablet Take 1 tablet (25 mg total) by mouth 2 (two) times daily with meals.    cetirizine (ZYRTEC) 10 MG tablet Take 10 mg by mouth once daily.    clopidogrel (PLAVIX) 75 mg tablet TAKE 1 TABLET EVERY DAY    isosorbide mononitrate (IMDUR) 60 MG 24 hr tablet Take 2 tablets (120 mg total) by mouth once daily.    losartan (COZAAR) 100 MG tablet Take 1 tablet (100 mg total) by mouth once daily.    omeprazole (PRILOSEC) 20 MG capsule TAKE 1 CAPSULE TWICE DAILY    mupirocin (BACTROBAN) 2 % ointment Apply topically 3 (three) times daily.    nitroglycerin (NITROSTAT) 0.6 MG Subl Place 1 tablet  (0.6 mg total) under the tongue every 5 (five) minutes as needed (max 3/ per episode).    oxyCODONE-acetaminophen (PERCOCET) 5-325 mg per tablet Take 1 tablet by mouth every 6 (six) hours as needed for Pain.     Family History     Problem Relation (Age of Onset)    COPD Mother    Cancer Mother    Diabetes Daughter    Heart disease Father        Tobacco Use    Smoking status: Former Smoker     Packs/day: 1.00     Years: 15.00     Pack years: 15.00     Last attempt to quit: 2009     Years since quittin.0    Smokeless tobacco: Never Used   Substance and Sexual Activity    Alcohol use: No    Drug use: No     Comment: Is on prescription opiod, no non prescribed use    Sexual activity: Not Currently     Partners: Female     Review of Systems   Constitution: Positive for malaise/fatigue.   HENT: Negative for hearing loss and hoarse voice.    Eyes: Negative for blurred vision and visual disturbance.   Cardiovascular: Positive for dyspnea on exertion and leg swelling. Negative for chest pain, claudication, irregular heartbeat, near-syncope, orthopnea, palpitations, paroxysmal nocturnal dyspnea and syncope.   Respiratory: Positive for shortness of breath. Negative for cough, hemoptysis, sleep disturbances due to breathing, snoring and wheezing.    Endocrine: Negative for cold intolerance and heat intolerance.   Hematologic/Lymphatic: Does not bruise/bleed easily.   Skin: Negative for color change, dry skin and nail changes.   Musculoskeletal: Positive for arthritis and back pain. Negative for joint pain and myalgias.   Gastrointestinal: Negative for bloating, abdominal pain, constipation, nausea and vomiting.   Genitourinary: Negative for dysuria, flank pain, hematuria and hesitancy.   Neurological: Positive for weakness. Negative for headaches, light-headedness, loss of balance, numbness and paresthesias.   Psychiatric/Behavioral: Negative for altered mental status.   Allergic/Immunologic: Negative for  environmental allergies.     Objective:     Vital Signs (Most Recent):  Temp: 97.3 °F (36.3 °C) (01/14/20 1512)  Pulse: 73 (01/14/20 1512)  Resp: 18 (01/14/20 1512)  BP: (!) 148/64 (01/14/20 1512)  SpO2: 97 % (01/14/20 1512) Vital Signs (24h Range):  Temp:  [97.3 °F (36.3 °C)-98.8 °F (37.1 °C)] 97.3 °F (36.3 °C)  Pulse:  [70-97] 73  Resp:  [17-46] 18  SpO2:  [95 %-100 %] 97 %  BP: (135-193)/(64-87) 148/64     Weight: 79 kg (174 lb 2.6 oz)  Body mass index is 22.98 kg/m².    SpO2: 97 %  O2 Device (Oxygen Therapy): nasal cannula      Intake/Output Summary (Last 24 hours) at 1/14/2020 1519  Last data filed at 1/14/2020 0027  Gross per 24 hour   Intake --   Output 425 ml   Net -425 ml       Lines/Drains/Airways     Peripheral Intravenous Line                 Peripheral IV - Single Lumen 20 G Left Antecubital -- days                Physical Exam   Constitutional: He is oriented to person, place, and time. He appears well-developed and well-nourished. No distress.   HENT:   Head: Normocephalic and atraumatic.   Eyes: Pupils are equal, round, and reactive to light.   Neck: Normal range of motion and full passive range of motion without pain. Neck supple. No JVD present. No tracheal deviation present. No thyromegaly present.   Cardiovascular: Normal rate, regular rhythm, S1 normal, S2 normal and intact distal pulses. PMI is not displaced. Exam reveals no distant heart sounds.   No murmur heard.  Pulses:       Radial pulses are 2+ on the right side, and 2+ on the left side.        Dorsalis pedis pulses are 2+ on the right side, and 2+ on the left side.   CHEST PAIN FREE   Pulmonary/Chest: Effort normal and breath sounds normal. No accessory muscle usage. No respiratory distress. He has no decreased breath sounds. He has no wheezes. He has no rales.   Abdominal: Soft. Bowel sounds are normal. He exhibits no distension. There is no tenderness.   Musculoskeletal: Normal range of motion. He exhibits no edema.        Left ankle:  He exhibits swelling.   Neurological: He is alert and oriented to person, place, and time.   Skin: Skin is warm and dry. He is not diaphoretic. No cyanosis. Nails show no clubbing.   Psychiatric: He has a normal mood and affect. His speech is normal and behavior is normal. Judgment and thought content normal. Cognition and memory are normal.   Nursing note and vitals reviewed.      Significant Labs:   BMP:   Recent Labs   Lab 01/13/20 2037 01/14/20  0729   * 153*    140   K 4.4 4.6   * 114*   CO2 13* 13*   BUN 22 28*   CREATININE 2.4* 2.4*   CALCIUM 7.6* 8.5*   MG  --  1.2*   , CMP   Recent Labs   Lab 01/13/20 2037 01/14/20  0729    140   K 4.4 4.6   * 114*   CO2 13* 13*   * 153*   BUN 22 28*   CREATININE 2.4* 2.4*   CALCIUM 7.6* 8.5*   PROT 7.9 8.2   ALBUMIN 3.7 3.8   BILITOT 0.5 0.5   ALKPHOS 187* 184*   AST 10 9*   ALT 8* 7*   ANIONGAP 13 13   ESTGFRAFRICA 30* 30*   EGFRNONAA 26* 26*   , CBC   Recent Labs   Lab 01/13/20 2037 01/14/20  0729   WBC 8.94 4.67   HGB 9.1* 9.1*   HCT 30.3* 29.8*    204   , Troponin   Recent Labs   Lab 01/13/20 2037 01/14/20  0027 01/14/20  0729   TROPONINI <0.006 0.024 0.026    and All pertinent lab results from the last 24 hours have been reviewed.    Significant Imaging: Echocardiogram:   2D echo with color flow doppler:   Results for orders placed or performed during the hospital encounter of 01/13/20   2D echo with color flow doppler    Narrative    This study is in progress....    and Transthoracic echo (TTE) complete (Cupid Only): No results found for this or any previous visit. and X-Ray: CXR: X-Ray Chest 1 View (CXR): No results found for this visit on 01/13/20. and X-Ray Chest PA and Lateral (CXR): No results found for this visit on 01/13/20.

## 2025-01-01 NOTE — ED ADULT NURSE NOTE - NSFALLHARMRISKINTERV_ED_ALL_ED
Assistance OOB with selected safe patient handling equipment if applicable/Assistance with ambulation/Communicate risk of Fall with Harm to all staff, patient, and family/Monitor gait and stability/Provide visual cue: red socks, yellow wristband, yellow gown, etc/Reinforce activity limits and safety measures with patient and family/Bed in lowest position, wheels locked, appropriate side rails in place/Call bell, personal items and telephone in reach/Instruct patient to call for assistance before getting out of bed/chair/stretcher/Non-slip footwear applied when patient is off stretcher/[place] to call system/Physically safe environment - no spills, clutter or unnecessary equipment/Purposeful Proactive Rounding/Room/bathroom lighting operational, light cord in reach

## 2025-01-01 NOTE — ED PROVIDER NOTE - CLINICAL SUMMARY MEDICAL DECISION MAKING FREE TEXT BOX
84-year-old female with hypertension, dementia A&O x 1 at baseline, former smoker presents emergency room from nursing home for shortness of breath/difficulty breathing since earlier today.  No history obtained from patient, h/o dementia but alert and denies any pain or complaints.  Spoke with [facility] healthcare proxy who states patient is DNR/DNI, having recurrent urinary tract infections since being in nursing home. Tachycardic, febrile, hypoxic on room air, tachypneic, diffuse wheezing on lung exam, given DuoNebs and Solu-Medrol by EMS, given additional DuoNebs and mag in ER and placed on AVAPS.  ABG with pH of 7.38 and pCO2 of 74, repeat ABG with pH of 7.26 and pCO2 of 83.  Having worsening mental status, able to arouse but not as alert as prior.  Discussed with healthcare [facility] proxy that patient is very sick and is not breathing great, will be admitted to the floors for urinary tract infection and acute respiratory failure with hypoxia and hypercapnia however unclear if patient is going to make it.  Dr. Samuel spoke with Dr. Shahid, will admit to medicine on telemetry.

## 2025-01-01 NOTE — ED ADULT NURSE NOTE - ED STAT RN HANDOFF DETAILS
Hand off report given to Lynda MCKAY on ED Hold. Pt aaox0, lethargic, on Bipap (on VAP setting), Dr. Shahid at bedside aware of pt condition. Vitals stable. Pt in NAD at this time. DNR/DNI in place and in chart

## 2025-01-01 NOTE — H&P ADULT - NSICDXPASTMEDICALHX_GEN_ALL_CORE_FT
PAST MEDICAL HISTORY:  Breast cancer     Dementia     HTN (hypertension)     Mood disorder     Recurrent urinary tract infection

## 2025-01-01 NOTE — H&P ADULT - PROBLEM/PLAN-4
IP CONSULT TO NEUROLOGY  Consult performed by: Vania Elder MD  Consult ordered by: Angel Raymundo MD            NEUROLOGY CONSULT    NAME Mariajose Hinton AGE 75 y.o. MRN [id number]  1956     REQUESTING PHYSICIAN: Cynthia Hui MD      CHIEF COMPLAINT:  Altered mental status     This is a 75 y.o. right-handed grieving . Patient has a known diagnosis of pseudodementia. She is followed in clinic by Dr. Benjamin Stratton. She had was admitted secondary to her daughter's concern that she was cognitively impaired beyond baseline. She reported no sensory or motor deficits but indicated that she requires help. She feels close to her baseline at the moment. ASSESSMENT AND PLAN     1. Acute encephalopathy  May have been metabolic secondary to hyponatremia, hypokalemia and hypomagnesemia  Improving;  continue hydration and electrolyte correction  Please reconsult with any further questions arise  Initial labs: Magnesium 1.3; sodium 129; potassium 2.3    2. Pseudodementia  Secondary to recurrent depression and recent loss of her  to illness  Continue Effexor    3. Acquired hypothyroidism  Continue levothyroxine    4.  Hypertension  continue lisinopril           ALLERGIES:  Sulfa (sulfonamide antibiotics)     Current Facility-Administered Medications   Medication Dose Route Frequency    potassium chloride 10 mEq in 100 ml IVPB  10 mEq IntraVENous Q1H    lisinopriL (PRINIVIL, ZESTRIL) tablet 10 mg  10 mg Oral DAILY    levothyroxine (SYNTHROID) tablet 88 mcg  88 mcg Oral ACB    sodium chloride (NS) flush 5-40 mL  5-40 mL IntraVENous Q8H    sodium chloride (NS) flush 5-40 mL  5-40 mL IntraVENous PRN    polyethylene glycol (MIRALAX) packet 17 g  17 g Oral DAILY PRN    promethazine (PHENERGAN) tablet 12.5 mg  12.5 mg Oral Q6H PRN    Or    ondansetron (ZOFRAN) injection 4 mg  4 mg IntraVENous Q6H PRN    enoxaparin (LOVENOX) injection 40 mg  40 mg SubCUTAneous DAILY    acetaminophen (TYLENOL) tablet 650 mg  650 mg Oral Q4H PRN    Or    acetaminophen (TYLENOL) solution 650 mg  650 mg Per NG tube Q4H PRN    Or    acetaminophen (TYLENOL) suppository 650 mg  650 mg Rectal Q4H PRN    potassium chloride (K-DUR, KLOR-CON) SR tablet 20 mEq  20 mEq Oral BID    hydrALAZINE (APRESOLINE) 20 mg/mL injection 10 mg  10 mg IntraVENous Q6H PRN       Past Medical History:   Diagnosis Date    Arthritis     Endocrine disease     Headache     Hypertension     Memory disorder     Thyroid disease        Social History     Tobacco Use    Smoking status: Current Every Day Smoker     Packs/day: 0.50    Smokeless tobacco: Never Used   Substance Use Topics    Alcohol use: No       Family History   Problem Relation Age of Onset    Cancer Mother     Dementia Mother     Migraines Mother     Kidney Disease Father      Review of Systems   Constitutional: Negative for chills and fever. HENT: Negative for ear pain. Eyes: Negative for pain and discharge. Respiratory: Negative for cough and hemoptysis. Cardiovascular: Negative for chest pain and claudication. Gastrointestinal: Negative for constipation and diarrhea. Genitourinary: Negative for flank pain and hematuria. Musculoskeletal: Negative for back pain and myalgias. Skin: Negative for itching and rash. Neurological: Negative for headaches. Endo/Heme/Allergies: Negative for environmental allergies. Does not bruise/bleed easily. Psychiatric/Behavioral: Positive for memory loss. Negative for depression and hallucinations. Visit Vitals  BP (!) 157/99 (BP 1 Location: Right arm, BP Patient Position: At rest)   Pulse 82   Temp 98.2 °F (36.8 °C)   Resp 16   Wt 145 lb 11.6 oz (66.1 kg)   SpO2 98%   BMI 26.65 kg/m²      General: Well developed, well nourished.  Patient in no distress   Head: Normocephalic, atraumatic, anicteric sclera   Neck Normal ROM, No thyromegally   Lungs:  Clear to auscultation bilaterally   Cardiac: Regular rate and rhythm with no murmurs. Abd: Bowel sounds were audible. Ext: No pedal edema   Skin: Supple no rash     NeurologicExam:  Mental Status: Alert and oriented to person place and time  Appears confused but answers all questions including Identifying current president and wife and former president and wife. Speech: Fluent no aphasia or dysarthria. Cranial Nerves:  II - XII Intact   Motor:  Full and symmetric strength of upper and lower extremities  Normal bulk and tone. Reflexes:   Deep tendon reflexes 2+/4 and symmetric. Sensory:   Symmetric and intact with no deficits    Gait:  Deferred   Tremor:   No tremor noted. Cerebellar:  Coordination intact. Neurovascular: No carotid bruits. No JVD       REVIEWED IMAGING:    MRI :  Results from Hospital Encounter encounter on 08/07/20   MRI BRAIN WO CONT    Narrative EXAM: MRI BRAIN WO CONT    INDICATION: AMS    COMPARISON: CT from earlier the same day. CONTRAST: None. TECHNIQUE:    Multiplanar multisequence acquisition without contrast of the brain. FINDINGS:  Study is extremely limited by patient motion. On limited imaging was performed. The diffusion images are relatively interpretable demonstrate no evidence of  diffusion obstruction. T2-weighted images are suboptimal but grossly normal.  Other findings include left maxillary sinus opacification. Impression IMPRESSION:   Suboptimal study secondary to extensive motion. Diffusion-weighted imaging is  grossly normal. Remainder of the sequences are limited. CT:  Results from Hospital Encounter encounter on 08/07/20   CT HEAD WO CONT    Narrative EXAM: CT HEAD WO CONT    INDICATION: code s    COMPARISON: None. CONTRAST: None. TECHNIQUE: Unenhanced CT of the head was performed using 5 mm images. Brain and  bone windows were generated. Coronal and sagittal reformats.  CT dose reduction  was achieved through use of a standardized protocol tailored for this  examination and automatic exposure control for dose modulation. FINDINGS:  The ventricles and sulci are normal in size, shape and configuration. . Mild  scattered subcortical deep white matter hypodensities. . There is no intracranial  hemorrhage, extra-axial collection, or mass effect. The basilar cisterns are  open. No CT evidence of acute infarct. The bone windows demonstrate no abnormalities. Partial opacification of left  maxillary sinus. .      Impression IMPRESSION:   Mild chronic small vessel ischemic disease. No acute intracranial abnormality.            REVIEWED LABS:  Lab Results   Component Value Date/Time    WBC 7.4 08/08/2020 06:12 AM    HCT 40.0 08/08/2020 06:12 AM    HGB 14.2 08/08/2020 06:12 AM    PLATELET 634 62/32/1361 06:12 AM     Lab Results   Component Value Date/Time    Sodium 132 (L) 08/08/2020 06:12 AM    Potassium 2.7 (LL) 08/08/2020 06:12 AM    Chloride 98 08/08/2020 06:12 AM    CO2 26 08/08/2020 06:12 AM    Glucose 96 08/08/2020 06:12 AM    BUN 11 08/08/2020 06:12 AM    Creatinine 0.73 08/08/2020 06:12 AM    Calcium 9.7 08/08/2020 06:12 AM       Lab Results   Component Value Date/Time    Hemoglobin A1c 5.0 08/07/2020 02:03 PM DISPLAY PLAN FREE TEXT

## 2025-01-01 NOTE — H&P ADULT - HISTORY OF PRESENT ILLNESS
Patient is a 84F, with PMHx of Dementia, mood disorder, former smoker, recurrent UTIs, breast cancer, who was sent from NH for respiratory distress. She was given solumedrol 40, duoneb in the field. She was wheezing. Patient was saturating 93% on NRB mask. She was put on AVAPS. Patient is unable to be aroused. Only minimal reaction to sternal rub

## 2025-01-01 NOTE — H&P ADULT - NSHPPHYSICALEXAM_GEN_ALL_CORE
GENERAL: On AVAPS  HEAD: Atraumatic, Normocephalic  EYES: EOMI. Conjunctiva and sclera clear  NECK: Supple  CHEST/LUNG: Coarse lung sounds  HEART: Regular rate and rhythm; No murmurs  ABDOMEN: Soft, Nontender, Nondistended; Bowel sounds present  EXTREMITIES: No edema  NEURO: AAOx0.  SKIN: stage 1 sacral

## 2025-01-02 LAB
ALBUMIN SERPL ELPH-MCNC: 2.3 G/DL — LOW (ref 3.3–5)
ALP SERPL-CCNC: 74 U/L — SIGNIFICANT CHANGE UP (ref 40–120)
ALT FLD-CCNC: 29 U/L — SIGNIFICANT CHANGE UP (ref 12–78)
ANION GAP SERPL CALC-SCNC: 2 MMOL/L — LOW (ref 5–17)
AST SERPL-CCNC: 15 U/L — SIGNIFICANT CHANGE UP (ref 15–37)
BILIRUB SERPL-MCNC: 0.5 MG/DL — SIGNIFICANT CHANGE UP (ref 0.2–1.2)
BUN SERPL-MCNC: 22 MG/DL — SIGNIFICANT CHANGE UP (ref 7–23)
CALCIUM SERPL-MCNC: 9 MG/DL — SIGNIFICANT CHANGE UP (ref 8.5–10.1)
CHLORIDE SERPL-SCNC: 107 MMOL/L — SIGNIFICANT CHANGE UP (ref 96–108)
CO2 SERPL-SCNC: 34 MMOL/L — HIGH (ref 22–31)
CREAT SERPL-MCNC: 0.48 MG/DL — LOW (ref 0.5–1.3)
EGFR: 92 ML/MIN/1.73M2 — SIGNIFICANT CHANGE UP
GLUCOSE SERPL-MCNC: 136 MG/DL — HIGH (ref 70–99)
HCT VFR BLD CALC: 45.5 % — HIGH (ref 34.5–45)
HGB BLD-MCNC: 14 G/DL — SIGNIFICANT CHANGE UP (ref 11.5–15.5)
MCHC RBC-ENTMCNC: 29.4 PG — SIGNIFICANT CHANGE UP (ref 27–34)
MCHC RBC-ENTMCNC: 30.8 G/DL — LOW (ref 32–36)
MCV RBC AUTO: 95.6 FL — SIGNIFICANT CHANGE UP (ref 80–100)
NRBC # BLD: 0 /100 WBCS — SIGNIFICANT CHANGE UP (ref 0–0)
PLATELET # BLD AUTO: 221 K/UL — SIGNIFICANT CHANGE UP (ref 150–400)
POTASSIUM SERPL-MCNC: 4.1 MMOL/L — SIGNIFICANT CHANGE UP (ref 3.5–5.3)
POTASSIUM SERPL-SCNC: 4.1 MMOL/L — SIGNIFICANT CHANGE UP (ref 3.5–5.3)
PROT SERPL-MCNC: 7.1 GM/DL — SIGNIFICANT CHANGE UP (ref 6–8.3)
RBC # BLD: 4.76 M/UL — SIGNIFICANT CHANGE UP (ref 3.8–5.2)
RBC # FLD: 14.3 % — SIGNIFICANT CHANGE UP (ref 10.3–14.5)
SODIUM SERPL-SCNC: 143 MMOL/L — SIGNIFICANT CHANGE UP (ref 135–145)
WBC # BLD: 17.76 K/UL — HIGH (ref 3.8–10.5)
WBC # FLD AUTO: 17.76 K/UL — HIGH (ref 3.8–10.5)

## 2025-01-02 PROCEDURE — 99222 1ST HOSP IP/OBS MODERATE 55: CPT

## 2025-01-02 PROCEDURE — G0545: CPT

## 2025-01-02 PROCEDURE — 99232 SBSQ HOSP IP/OBS MODERATE 35: CPT

## 2025-01-02 RX ORDER — CEFTRIAXONE SODIUM 1 G/1
1000 INJECTION, POWDER, FOR SOLUTION INTRAMUSCULAR; INTRAVENOUS EVERY 24 HOURS
Refills: 0 | Status: COMPLETED | OUTPATIENT
Start: 2025-01-02 | End: 2025-01-06

## 2025-01-02 RX ORDER — AZITHROMYCIN MONOHYDRATE 200 MG/5ML
500 POWDER, FOR SUSPENSION ORAL EVERY 24 HOURS
Refills: 0 | Status: DISCONTINUED | OUTPATIENT
Start: 2025-01-02 | End: 2025-01-06

## 2025-01-02 RX ORDER — IPRATROPIUM BROMIDE AND ALBUTEROL SULFATE .5; 2.5 MG/3ML; MG/3ML
3 SOLUTION RESPIRATORY (INHALATION) ONCE
Refills: 0 | Status: COMPLETED | OUTPATIENT
Start: 2025-01-02 | End: 2025-01-02

## 2025-01-02 RX ORDER — INFLUENZA A VIRUS A/WISCONSIN/588/2019 (H1N1) RECOMBINANT HEMAGGLUTININ ANTIGEN, INFLUENZA A VIRUS A/DARWIN/6/2021 (H3N2) RECOMBINANT HEMAGGLUTININ ANTIGEN, INFLUENZA B VIRUS B/AUSTRIA/1359417/2021 RECOMBINANT HEMAGGLUTININ ANTIGEN, AND INFLUENZA B VIRUS B/PHUKET/3073/2013 RECOMBINANT HEMAGGLUTININ ANTIGEN 45; 45; 45; 45 UG/.5ML; UG/.5ML; UG/.5ML; UG/.5ML
0.5 INJECTION INTRAMUSCULAR ONCE
Refills: 0 | Status: DISCONTINUED | OUTPATIENT
Start: 2025-01-02 | End: 2025-01-08

## 2025-01-02 RX ADMIN — IPRATROPIUM BROMIDE AND ALBUTEROL SULFATE 3 MILLILITER(S): .5; 2.5 SOLUTION RESPIRATORY (INHALATION) at 17:35

## 2025-01-02 RX ADMIN — PIPERACILLIN AND TAZOBACTAM 25 GRAM(S): 3; .375 INJECTION, POWDER, LYOPHILIZED, FOR SOLUTION INTRAVENOUS at 01:54

## 2025-01-02 RX ADMIN — CEFTRIAXONE SODIUM 100 MILLIGRAM(S): 1 INJECTION, POWDER, FOR SOLUTION INTRAMUSCULAR; INTRAVENOUS at 22:25

## 2025-01-02 RX ADMIN — VANCOMYCIN HYDROCHLORIDE 250 MILLIGRAM(S): 5 INJECTION, POWDER, LYOPHILIZED, FOR SOLUTION INTRAVENOUS at 11:28

## 2025-01-02 RX ADMIN — PIPERACILLIN AND TAZOBACTAM 25 GRAM(S): 3; .375 INJECTION, POWDER, LYOPHILIZED, FOR SOLUTION INTRAVENOUS at 17:55

## 2025-01-02 RX ADMIN — ENOXAPARIN SODIUM 40 MILLIGRAM(S): 60 INJECTION INTRAVENOUS; SUBCUTANEOUS at 05:58

## 2025-01-02 RX ADMIN — AZITHROMYCIN MONOHYDRATE 500 MILLIGRAM(S): 200 POWDER, FOR SUSPENSION ORAL at 19:42

## 2025-01-02 RX ADMIN — PIPERACILLIN AND TAZOBACTAM 25 GRAM(S): 3; .375 INJECTION, POWDER, LYOPHILIZED, FOR SOLUTION INTRAVENOUS at 09:08

## 2025-01-02 NOTE — SWALLOW BEDSIDE ASSESSMENT ADULT - H & P REVIEW
"Patient is a 84F, with PMHx of Dementia, mood disorder, former smoker, recurrent UTIs, breast cancer, who was sent from NH for respiratory distress. She was given solumedrol 40, duoneb in the field. She was wheezing. Patient was saturating 93% on NRB mask. She was put on AVAPS. Patient is unable to be aroused. Only minimal reaction to sternal rub"/yes

## 2025-01-02 NOTE — SWALLOW BEDSIDE ASSESSMENT ADULT - SWALLOW EVAL: DIAGNOSIS
pt presented with oropharyngeal dysphagia for puree/mild thick liquid. oral phase marked by fair labial seal/stripping utensil, adequate oral containment, slowed bolus manipulation and adequate transport posterior. +initiation of pharyngeal swallow trigger and laryngeal excursion to palpation without overt signs of impaired airway protection

## 2025-01-02 NOTE — SWALLOW BEDSIDE ASSESSMENT ADULT - COMMENTS
pt seen bedside seated upright on NC02 and alert. pt pleasantly confused, she engaged with SLP for assessment and noted difficulty following directions. sp02 trending 89-90 during po trials.    CXR 1/1/2024 IMPRESSION: Mild CHF. Cardiomegaly. Can't exclude underlying pneumonia. Consider chest CT. pt seen bedside seated upright on NC02 and alert. pt pleasantly confused, she engaged with SLP for assessment, verbalized wants and noted difficulty following directions. sp02 trending 89-90 during po trials.    CXR 1/1/2024 IMPRESSION: Mild CHF. Cardiomegaly. Can't exclude underlying pneumonia. Consider chest CT.

## 2025-01-02 NOTE — CONSULT NOTE ADULT - SUBJECTIVE AND OBJECTIVE BOX
HPI:  Patient is a 84F, with PMHx of Dementia, mood disorder, former smoker, recurrent UTIs, breast cancer, who was sent from NH for respiratory distress. She was given solumedrol 40, duoneb in the field. She was wheezing. Patient was saturating 93% on NRB mask. She was put on AVAPS. Patient is unable to be aroused. Only minimal reaction to sternal rub (01 Jan 2025 18:47)      PAST MEDICAL & SURGICAL HISTORY:  HTN (hypertension)      Breast cancer      Dementia      Recurrent urinary tract infection      Mood disorder      H/O left mastectomy          Allergies    No Known Allergies    Intolerances        ANTIMICROBIALS:  piperacillin/tazobactam IVPB.. 3.375 every 8 hours  vancomycin  IVPB 1000 once      OTHER MEDS:  enoxaparin Injectable 40 milliGRAM(s) SubCutaneous every 24 hours  influenza  Vaccine (HIGH DOSE) 0.5 milliLiter(s) IntraMuscular once      SOCIAL HISTORY:    Marital Status:    Occupation:   Lives with:     Substance Use (street drugs):   Tobacco Usage:    Alcohol Usage: Social EtOH    FAMILY HISTORY:  FH: HTN (hypertension)        ROS:  Unobtainable because:   All other systems negative     Constitutional: no fever, no chills, no weight loss, no night sweats  Eye: no eye pain, no redness, no vision changes  ENT:  no sore throat, no rhinorrhea  Cardiovascular:  no chest pain, no palpitation  Respiratory:  no SOB, no cough  GI:  no abd pain, no vomiting, no diarrhea  urinary: no dysuria, no hematuria, no flank pain  : no  discharge or bleeding  musculoskeletal:  no joint pain, no joint swelling  skin:  no rash  neurology:  no headache, no seizure, no change in mental status  psych: no anxiety, no depression     Physical Exam:    General:    NAD, non toxic  Head: atraumatic, normocephalic  Eyes: normal sclera and conjunctiva  ENT:   no oropharyngeal lesions, no LAD, neck supple  Cardio:    regular S1,S2, no murmur  Respiratory:   clear to auscultation b/l, no wheezing  abd:   soft, BS +, not tender, no hepatosplenomegaly  :     no CVAT, no suprapubic tenderness, no [place]  Musculoskeletal : no joint swelling, no edema  Skin:    no rash  vascular:  normal pulses  Neurologic:     no focal deficits  psych: normal affect, no suicidal ideation      Drug Dosing Weight  Height (cm): 160 (11 Jul 2023 13:08)  Weight (kg): 80.3 (02 Jan 2025 00:00)  BMI (kg/m2): 31.4 (02 Jan 2025 00:00)  BSA (m2): 1.84 (02 Jan 2025 00:00)    Vital Signs Last 24 Hrs  T(F): 98.4 (01-02-25 @ 04:46), Max: 101.7 (01-01-25 @ 15:10)    Vital Signs Last 24 Hrs  HR: 80 (01-02-25 @ 08:30) (74 - 119)  BP: 112/65 (01-02-25 @ 04:46) (111/64 - 157/92)  RR: 18 (01-02-25 @ 04:46)  SpO2: 91% (01-02-25 @ 08:30) (91% - 99%)  Wt(kg): --                          14.0   17.76 )-----------( 221      ( 02 Jan 2025 07:02 )             45.5       01-02    143  |  107  |  22  ----------------------------<  136[H]  4.1   |  34[H]  |  0.48[L]    Ca    9.0      02 Jan 2025 07:02    TPro  7.1  /  Alb  2.3[L]  /  TBili  0.5  /  DBili  x   /  AST  15  /  ALT  29  /  AlkPhos  74  01-02      Urinalysis Basic - ( 02 Jan 2025 07:02 )    Color: x / Appearance: x / SG: x / pH: x  Gluc: 136 mg/dL / Ketone: x  / Bili: x / Urobili: x   Blood: x / Protein: x / Nitrite: x   Leuk Esterase: x / RBC: x / WBC x   Sq Epi: x / Non Sq Epi: x / Bacteria: x        MICROBIOLOGY:  v              RADIOLOGY:       HPI:  Patient is a 84 year old Female , with PMHx of Dementia, mood disorder, former smoker, recurrent UTIs, breast cancer, who was sent from NH for respiratory distress. She was given solumedrol 40, duoneb in the field. She was wheezing. Patient was saturating 93% on NRB mask.  (01 Jan 2025 18:47)    infectious Disease consult requested today to help with antibiotic management for pneumonia.    patient seen and examined today.  she is awake, alert  on NC  denies any sob  has cough  denies any fever or chills  denies any chest pain  denies any dysurea        PAST MEDICAL & SURGICAL HISTORY:  HTN (hypertension)      Breast cancer      Dementia      Recurrent urinary tract infection      Mood disorder      H/O left mastectomy          Allergies    No Known Drug Allergies        ANTIMICROBIALS:  piperacillin/tazobactam IVPB.. 3.375 every 8 hours  vancomycin  IVPB 1000 once      OTHER MEDS:  enoxaparin Injectable 40 milliGRAM(s) SubCutaneous every 24 hours  influenza  Vaccine (HIGH DOSE) 0.5 milliLiter(s) IntraMuscular once      SOCIAL HISTORY:      Lives with: NH    previous smoker many years ago    FAMILY HISTORY:  FH: HTN (hypertension)        ROS:  Unobtainable because:   All other systems negative     Constitutional: no fever, no chills, no weight loss, no night sweats  Eye: no eye pain, no redness, no vision changes  ENT:  no sore throat, no rhinorrhea  Cardiovascular:  no chest pain, no palpitation  Respiratory:  no SOB, + cough  GI:  no abd pain, no vomiting, no diarrhea  urinary: no dysuria, no hematuria, no flank pain  : no  discharge or bleeding  musculoskeletal:  no joint pain, no joint swelling  skin:  no rash  neurology:  no headache    Physical Exam:    General:    NAD, non toxic  Head: atraumatic, normocephalic  Eyes: normal sclera and conjunctiva  ENT:   no oropharyngeal lesions, no LAD, neck supple  Cardio:    regular S1,S2  Respiratory:   no wheezing, no rales, coarse cough  abd:   soft, BS +, not tender, no distention  :     no CVAT, no suprapubic tenderness  Musculoskeletal : no joint swelling, no edema  Skin:    no rash  vascular:  normal pulses  Neurologic:    awake, alert, can answer most questions  psych: normal affect      Drug Dosing Weight  Height (cm): 160 (11 Jul 2023 13:08)  Weight (kg): 80.3 (02 Jan 2025 00:00)  BMI (kg/m2): 31.4 (02 Jan 2025 00:00)  BSA (m2): 1.84 (02 Jan 2025 00:00)    Vital Signs Last 24 Hrs  T(F): 98.4 (01-02-25 @ 04:46), Max: 101.7 (01-01-25 @ 15:10)    Vital Signs Last 24 Hrs  HR: 80 (01-02-25 @ 08:30) (74 - 119)  BP: 112/65 (01-02-25 @ 04:46) (111/64 - 157/92)  RR: 18 (01-02-25 @ 04:46)  SpO2: 91% (01-02-25 @ 08:30) (91% - 99%)  Wt(kg): --                          14.0   17.76 )-----------( 221      ( 02 Jan 2025 07:02 )             45.5       01-02    143  |  107  |  22  ----------------------------<  136[H]  4.1   |  34[H]  |  0.48[L]    Ca    9.0      02 Jan 2025 07:02    TPro  7.1  /  Alb  2.3[L]  /  TBili  0.5  /  DBili  x   /  AST  15  /  ALT  29  /  AlkPhos  74  01-02      Urinalysis Basic - ( 02 Jan 2025 07:02 )    Color: x / Appearance: x / SG: x / pH: x  Gluc: 136 mg/dL / Ketone: x  / Bili: x / Urobili: x   Blood: x / Protein: x / Nitrite: x   Leuk Esterase: x / RBC: x / WBC x   Sq Epi: x / Non Sq Epi: x / Bacteria: x        MICROBIOLOGY:    RADIOLOGY:  < from: Xray Chest 1 View- PORTABLE-Urgent (01.01.25 @ 15:58) >  ACC: [id number] EXAM:  XR CHEST PORTABLE URGENT 1V   ORDERED BY: MONTY BERUMEN     PROCEDURE DATE:  01/01/2025          INTERPRETATION:  TECHNIQUE: Single portable view of the chest.    COMPARISON:  7/11/2023    CLINICAL HISTORY: Sepsis    FINDINGS:    Single frontal view of the chest demonstrates CHF. The cardiomediastinal   silhouette is enlarged. No acute osseous abnormalities. Overlying EKG   leads and wires are noted. Moderate-sized ventral hernia. Widened   mediastinum. The patient is rotated.    IMPRESSION: Mild CHF. Cardiomegaly. Can't exclude underlying pneumonia.   Consider chest CT.    --- End of Report ---            PASQUALE JASON MD  This document has been electronically signed. Jan 1 2025  9:45PM    < end of copied text >

## 2025-01-02 NOTE — SWALLOW BEDSIDE ASSESSMENT ADULT - SLP PRECAUTIONS/LIMITATIONS: VISION
[FreeTextEntry1] : Mrs. 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    within functional limits

## 2025-01-02 NOTE — PATIENT PROFILE ADULT - FALL HARM RISK - HARM RISK INTERVENTIONS
Assistance with ambulation/Assistance OOB with selected safe patient handling equipment/Communicate Risk of Fall with Harm to all staff/Monitor for mental status changes/Reinforce activity limits and safety measures with patient and family/Review medications for side effects contributing to fall risk/Tailored Fall Risk Interventions/Visual Cue: Yellow wristband and red socks/Bed in lowest position, wheels locked, appropriate side rails in place/Call bell, personal items and telephone in reach/Instruct patient to call for assistance before getting out of bed or chair/Non-slip footwear when patient is out of bed/[place] to call system/Physically safe environment - no spills, clutter or unnecessary equipment/Purposeful Proactive Rounding/Room/bathroom lighting operational, light cord in reach

## 2025-01-02 NOTE — SWALLOW BEDSIDE ASSESSMENT ADULT - ADDITIONAL RECOMMENDATIONS
Short term Goals pt will tolerate recommended textures without s/sx of aspiration.   pt/ family education regarding oral care /safe swallowing guidelines and will demonstrate understanding and carryover.   Suggest medical team initiate GOC conversation for pt /family wishes regarding oral feeding and PEG tube.  Consider palliative care consult

## 2025-01-02 NOTE — SWALLOW BEDSIDE ASSESSMENT ADULT - SLP PRECAUTIONS/LIMITATIONS: HEARING
? [place] ([place] - pt needed repetition, increased volume/impaired ? [place] - pt needed repetition, increased volume vs processing in the setting of dementia/impaired

## 2025-01-02 NOTE — PROGRESS NOTE ADULT - SUBJECTIVE AND OBJECTIVE BOX
Patient is a 84y old  Female who presents with a chief complaint of Sepsis, acute respiratory failure with hypoxia and hypercapnia (02 Jan 2025 11:19)      INTERVAL HPI/OVERNIGHT EVENTS:    MEDICATIONS  (STANDING):  enoxaparin Injectable 40 milliGRAM(s) SubCutaneous every 24 hours  influenza  Vaccine (HIGH DOSE) 0.5 milliLiter(s) IntraMuscular once  piperacillin/tazobactam IVPB.. 3.375 Gram(s) IV Intermittent every 8 hours    MEDICATIONS  (PRN):      Allergies    No Known Allergies    Intolerances        REVIEW OF SYSTEMS: all negative with exception of above    Vital Signs Last 24 Hrs  T(C): 36.3 (02 Jan 2025 11:02), Max: 38.7 (01 Jan 2025 15:10)  T(F): 97.4 (02 Jan 2025 11:02), Max: 101.7 (01 Jan 2025 15:10)  HR: 84 (02 Jan 2025 11:02) (74 - 119)  BP: 124/74 (02 Jan 2025 11:02) (111/64 - 157/92)  BP(mean): --  RR: 20 (02 Jan 2025 11:02) (17 - 30)  SpO2: 90% (02 Jan 2025 11:02) (90% - 99%)    Parameters below as of 02 Jan 2025 11:02  Patient On (Oxygen Delivery Method): nasal cannula  O2 Flow (L/min): 3      PHYSICAL EXAM:  GENERAL: NAD, well-groomed, well-developed  HEAD:  Atraumatic, Normocephalic  EYES: EOMI, PERRLA, conjunctiva and sclera clear  ENMT: No tonsillar erythema, exudates, or enlargement; Moist mucous membranes, Good dentition, No lesions  NECK: Supple, No JVD, Normal thyroid  NERVOUS SYSTEM:  Alert & Oriented X3, Good concentration; Motor Strength 5/5 B/L upper and lower extremities; DTRs 2+ intact and symmetric  CHEST/LUNG: Clear to percussion bilaterally; No rales, rhonchi, wheezing, or rubs  HEART: Regular rate and rhythm; No murmurs, rubs, or gallops  ABDOMEN: Soft, Nontender, Nondistended; Bowel sounds present  EXTREMITIES:  2+ Peripheral Pulses, No clubbing, cyanosis, or edema  LYMPH: No lymphadenopathy noted  SKIN: No rashes or lesions    LABS:                        14.0   17.76 )-----------( 221      ( 02 Jan 2025 07:02 )             45.5     01-02    143  |  107  |  22  ----------------------------<  136[H]  4.1   |  34[H]  |  0.48[L]    Ca    9.0      02 Jan 2025 07:02    TPro  7.1  /  Alb  2.3[L]  /  TBili  0.5  /  DBili  x   /  AST  15  /  ALT  29  /  AlkPhos  74  01-02    PT/INR - ( 01 Jan 2025 15:40 )   PT: 14.2 sec;   INR: 1.23 ratio         PTT - ( 01 Jan 2025 15:40 )  PTT:31.5 sec  Urinalysis Basic - ( 02 Jan 2025 07:02 )    Color: x / Appearance: x / SG: x / pH: x  Gluc: 136 mg/dL / Ketone: x  / Bili: x / Urobili: x   Blood: x / Protein: x / Nitrite: x   Leuk Esterase: x / RBC: x / WBC x   Sq Epi: x / Non Sq Epi: x / Bacteria: x      CAPILLARY BLOOD GLUCOSE          RADIOLOGY & ADDITIONAL TESTS:    Imaging Personally Reviewed:  [ ] YES  [ ] NO    Consultant(s) Notes Reviewed:  [ ] YES  [ ] NO    Care Discussed with Consultants/Other Providers [ ] YES  [ ] NO

## 2025-01-02 NOTE — PROGRESS NOTE ADULT - PROBLEM SELECTOR PLAN 1
- P/w temp 101.7, WBC 12.65  - Lactate 1.3  - CXR roughly clear on wet read  - UA positive  - S/p Ceftriaxone  - Will give Zosyn  - F/u cultures  - ID consult placed

## 2025-01-03 ENCOUNTER — RESULT REVIEW (OUTPATIENT)
Age: 85
End: 2025-01-03

## 2025-01-03 LAB
ALBUMIN SERPL ELPH-MCNC: 2.4 G/DL — LOW (ref 3.3–5)
ALP SERPL-CCNC: 76 U/L — SIGNIFICANT CHANGE UP (ref 40–120)
ALT FLD-CCNC: 34 U/L — SIGNIFICANT CHANGE UP (ref 12–78)
ANION GAP SERPL CALC-SCNC: 3 MMOL/L — LOW (ref 5–17)
AST SERPL-CCNC: 27 U/L — SIGNIFICANT CHANGE UP (ref 15–37)
BILIRUB SERPL-MCNC: 0.6 MG/DL — SIGNIFICANT CHANGE UP (ref 0.2–1.2)
BUN SERPL-MCNC: 33 MG/DL — HIGH (ref 7–23)
CALCIUM SERPL-MCNC: 8.9 MG/DL — SIGNIFICANT CHANGE UP (ref 8.5–10.1)
CHLORIDE SERPL-SCNC: 108 MMOL/L — SIGNIFICANT CHANGE UP (ref 96–108)
CO2 SERPL-SCNC: 34 MMOL/L — HIGH (ref 22–31)
CREAT SERPL-MCNC: 0.52 MG/DL — SIGNIFICANT CHANGE UP (ref 0.5–1.3)
EGFR: 90 ML/MIN/1.73M2 — SIGNIFICANT CHANGE UP
GLUCOSE SERPL-MCNC: 97 MG/DL — SIGNIFICANT CHANGE UP (ref 70–99)
HCT VFR BLD CALC: 44.1 % — SIGNIFICANT CHANGE UP (ref 34.5–45)
HGB BLD-MCNC: 13.4 G/DL — SIGNIFICANT CHANGE UP (ref 11.5–15.5)
LEGIONELLA AG UR QL: NEGATIVE — SIGNIFICANT CHANGE UP
M PNEUMO IGM SER-ACNC: 0.37 INDEX — SIGNIFICANT CHANGE UP (ref 0–0.9)
MCHC RBC-ENTMCNC: 28.8 PG — SIGNIFICANT CHANGE UP (ref 27–34)
MCHC RBC-ENTMCNC: 30.4 G/DL — LOW (ref 32–36)
MCV RBC AUTO: 94.8 FL — SIGNIFICANT CHANGE UP (ref 80–100)
MYCOPLASMA AG SPEC QL: NEGATIVE — SIGNIFICANT CHANGE UP
NRBC # BLD: 0 /100 WBCS — SIGNIFICANT CHANGE UP (ref 0–0)
PLATELET # BLD AUTO: 232 K/UL — SIGNIFICANT CHANGE UP (ref 150–400)
POTASSIUM SERPL-MCNC: 4.1 MMOL/L — SIGNIFICANT CHANGE UP (ref 3.5–5.3)
POTASSIUM SERPL-SCNC: 4.1 MMOL/L — SIGNIFICANT CHANGE UP (ref 3.5–5.3)
PROT SERPL-MCNC: 6.8 GM/DL — SIGNIFICANT CHANGE UP (ref 6–8.3)
RBC # BLD: 4.65 M/UL — SIGNIFICANT CHANGE UP (ref 3.8–5.2)
RBC # FLD: 14.4 % — SIGNIFICANT CHANGE UP (ref 10.3–14.5)
SODIUM SERPL-SCNC: 145 MMOL/L — SIGNIFICANT CHANGE UP (ref 135–145)
WBC # BLD: 14.95 K/UL — HIGH (ref 3.8–10.5)
WBC # FLD AUTO: 14.95 K/UL — HIGH (ref 3.8–10.5)

## 2025-01-03 PROCEDURE — 99232 SBSQ HOSP IP/OBS MODERATE 35: CPT

## 2025-01-03 PROCEDURE — 76604 US EXAM CHEST: CPT | Mod: 26

## 2025-01-03 PROCEDURE — G0545: CPT

## 2025-01-03 PROCEDURE — 99223 1ST HOSP IP/OBS HIGH 75: CPT | Mod: 25

## 2025-01-03 PROCEDURE — 93306 TTE W/DOPPLER COMPLETE: CPT | Mod: 26

## 2025-01-03 PROCEDURE — 93308 TTE F-UP OR LMTD: CPT | Mod: 26

## 2025-01-03 RX ORDER — IPRATROPIUM BROMIDE AND ALBUTEROL SULFATE .5; 2.5 MG/3ML; MG/3ML
3 SOLUTION RESPIRATORY (INHALATION) EVERY 6 HOURS
Refills: 0 | Status: DISCONTINUED | OUTPATIENT
Start: 2025-01-03 | End: 2025-01-06

## 2025-01-03 RX ADMIN — ENOXAPARIN SODIUM 40 MILLIGRAM(S): 60 INJECTION INTRAVENOUS; SUBCUTANEOUS at 05:03

## 2025-01-03 RX ADMIN — IPRATROPIUM BROMIDE AND ALBUTEROL SULFATE 3 MILLILITER(S): .5; 2.5 SOLUTION RESPIRATORY (INHALATION) at 23:44

## 2025-01-03 RX ADMIN — CEFTRIAXONE SODIUM 100 MILLIGRAM(S): 1 INJECTION, POWDER, FOR SOLUTION INTRAMUSCULAR; INTRAVENOUS at 21:37

## 2025-01-03 RX ADMIN — AZITHROMYCIN MONOHYDRATE 500 MILLIGRAM(S): 200 POWDER, FOR SUSPENSION ORAL at 21:41

## 2025-01-03 NOTE — CONSULT NOTE ADULT - NS ATTEND AMEND GEN_ALL_CORE FT
pt seen and examined at bedside with NP    spoke to daughter in law over the phone for collateral information  pt with hx of dementia: unable to give accurate history    per daughter in law pt resides in a nursing facility, has severe dementia and at best sometimes may recognize her son and daughter in law otherwise is overall confused and forgetful  pt states she is a smoker 2 cig/day and when asked if she uses oxygen she states yes  daughter in law states pt does not smoke, may have remotely when she was younger and is not on home O2 and never had been  daughter in law denies hx of asthma or COPD    pulmonary consulted by hospital medicine for COPD exacerbation      lung exam with bilateral wheeze  pt denies SOB  but noted to cough during exam    POCUS lung  [x] indication  - hypoxia, wheeze    [x] findings  - moderate b lines bilaterally  - no significant pleural effusions noted    POCUS echo  [x] indications  - hypoxia, wheeze    [x] findings  - LV function appears WNL ?wall motion abnormality      84F PMH HTN, dementia (AAOx1 baseline), hx of breast CA, recurrent UTIs, mood disorder, ?former smoker presents from nursing facility for respiratory distress, SOB, unresponsiveness/lethargy, wheeze. Noted to be febrile 101.7. Admitted to medical service for sepsis due to UTI/PNA with hypoxic and hypercarbic respiratory failure. Placed on AVAPS with blood gas 7.26/83/106/37/98%. Pulmonary consulted for COPD exacerbation.    Dx: acute hypoxic respiratory failure, acute on chronic hypercarbic respiratory failure, pulmonary congestion, PNA, UTI    - no prior hx of COPD or asthma  - ? former smoker  - bilateral wheeze noted on exam today  - has chronic stasis skin changes to bilateral LE however without edema  - initial BNP elevated 1379  - wheeze: pulmonary vs cardiac  - POCUS lung with moderate bilateral B lines no effusions  - flu/covid/RSV negative  - would check full RVP panel  - mycoplasma and legionella negative  - check sputum cx  - would repeat proBNP level and trend. concern if there is an underlying component of pulmonary congestion based on beside lung US findings  - check official 2D echo  - may benefit with a little gentle diuresis  - blood gas improved with most recent gas 7.42/59/78/38/96%  - she has baseline elevated bicarb level and is here with acute on chronic hypercarbic respiratory failure  - due to body habitus she may have underlying JOSE R and chronic hypercarbia  - would need outpatient PFTs to evaluate for underlying COPD if she was a smoker in the past  - may benefit from sleep study as well  - would continue treatment for UTI/PNA with antibx  - for wheeze would start duoneb q6 hours, hold on systemic steroids for now. if no improvement in wheeze/respiratory status will consider systemic steroids  - cont bipap at night and PRN during the day for work of breathing  - DNR/DNI, trial of NIV  - will follow

## 2025-01-03 NOTE — CONSULT NOTE ADULT - SUBJECTIVE AND OBJECTIVE BOX
HPI:  Patient is a 84F, with PMHx of Dementia, mood disorder, former smoker, recurrent UTIs, breast cancer, who was sent from NH for respiratory distress. She was given solumedrol 40, duoneb in the field. She was wheezing. Patient was saturating 93% on NRB mask. She was put on AVAPS. Patient is unable to be aroused. Only minimal reaction to sternal rub (01 Jan 2025 18:47)      PAST MEDICAL & SURGICAL HISTORY:  HTN (hypertension)      Breast cancer      Dementia      Recurrent urinary tract infection      Mood disorder      H/O left mastectomy          FAMILY HISTORY:  FH: HTN (hypertension)        SOCIAL HISTORY:  Smoking: [ ] Never Smoked [ ] Former Smoker (__ packs x ___ years) [ x] Current Smoker: endorses smoking approx 2 cigarettes daily ?- patient is confused unclear if true   Substance Use: [ ] Never Used [ ] Used ____  EtOH Use:  Marital Status: [ ] Single [ ]  [ ]  [ ]   Sexual History:   Occupation:  Recent Travel:  Country of Birth:  Advance Directives:    Allergies    No Known Allergies    Intolerances        HOME MEDICATIONS:    REVIEW OF SYSTEMS:  Constitutional: [x ] negative [ ] fevers [ ] chills [ ] weight loss [ ] weight gain  HEENT: [ x] negative [ ] dry eyes [ ] eye irritation [ ] postnasal drip [ ] nasal congestion  CV: [x ] negative  [ ] chest pain [ ] orthopnea [ ] palpitations [ ] murmur  Resp: [ ] negative [x ] cough [x ] shortness of breath [ x] dyspnea [ x] wheezing [ ] sputum [ ] hemoptysis  GI: [x ] negative [ ] nausea [ ] vomiting [ ] diarrhea [ ] constipation [ ] abd pain [ ] dysphagia   : [ x] negative [ ] dysuria [ ] nocturia [ ] hematuria [ ] increased urinary frequency  Musculoskeletal: [ x] negative [ ] back pain [ ] myalgias [ ] arthralgias [ ] fracture  Skin: [ x] negative [ ] rash [ ] itch  Neurological: [ x] negative [ ] headache [ ] dizziness [ ] syncope [ ] weakness [ ] numbness  Psychiatric: [x ] negative [ ] anxiety [ ] depression  Endocrine: [ x] negative [ ] diabetes [ ] thyroid problem  Hematologic/Lymphatic: [x ] negative [ ] anemia [ ] bleeding problem  Allergic/Immunologic: [ x] negative [ ] itchy eyes [ ] nasal discharge [ ] hives [ ] angioedema  [ ] All other systems negative  [ ] Unable to assess ROS because ________    OBJECTIVE:   Vital Signs Last 24 Hrs  T(C): 36.8 (03 Jan 2025 15:52), Max: 37.1 (02 Jan 2025 21:12)  T(F): 98.2 (03 Jan 2025 15:52), Max: 98.7 (02 Jan 2025 21:12)  HR: 114 (03 Jan 2025 15:52) (80 - 123)  BP: 166/100 (03 Jan 2025 15:52) (128/75 - 171/100)  BP(mean): --  ABP: --  ABP(mean): --  RR: 18 (03 Jan 2025 15:52) (18 - 20)  SpO2: 94% (03 Jan 2025 15:52) (90% - 97%)    O2 Parameters below as of 03 Jan 2025 15:52  Patient On (Oxygen Delivery Method): nasal cannula  O2 Flow (L/min): 3            01-02 @ 07:01 - 01-03 @ 07:00  --------------------------------------------------------  IN: 50 mL / OUT: 350 mL / NET: -300 mL    01-03 @ 07:01  -  01-03 @ 18:43  --------------------------------------------------------  IN: 260 mL / OUT: 500 mL / NET: -240 mL      CAPILLARY BLOOD GLUCOSE          PHYSICAL EXAM:  General: Awake, alert NAD  HEENT: NC/AT, PERRL, MMM  Neck: supple, no JVD  Respiratory: Mild wheeze B/L, no rhonchi rales  Cardiovascular: Regular rhythm, tachycardic  Abdomen: soft, NTTP, ND, +BS  Extremities:  no c/c/e. B/L LE discoloration chronic stasis appearing  Skin: no rashes  Neurological: A&Ox1, confused. non focal      HOSPITAL MEDICATIONS:  enoxaparin Injectable 40 milliGRAM(s) SubCutaneous every 24 hours    azithromycin   Tablet 500 milliGRAM(s) Oral every 24 hours  cefTRIAXone   IVPB 1000 milliGRAM(s) IV Intermittent every 24 hours                    influenza  Vaccine (HIGH DOSE) 0.5 milliLiter(s) IntraMuscular once          LABS:                        13.4   14.95 )-----------( 232      ( 03 Jan 2025 05:42 )             44.1     Hgb Trend: 13.4<--, 14.0<--, 15.5<--  01-03    145  |  108  |  33[H]  ----------------------------<  97  4.1   |  34[H]  |  0.52    Ca    8.9      03 Jan 2025 05:42    TPro  6.8  /  Alb  2.4[L]  /  TBili  0.6  /  DBili  x   /  AST  27  /  ALT  34  /  AlkPhos  76  01-03    Creatinine Trend: 0.52<--, 0.48<--, 0.52<--    Urinalysis Basic - ( 03 Jan 2025 05:42 )    Color: x / Appearance: x / SG: x / pH: x  Gluc: 97 mg/dL / Ketone: x  / Bili: x / Urobili: x   Blood: x / Protein: x / Nitrite: x   Leuk Esterase: x / RBC: x / WBC x   Sq Epi: x / Non Sq Epi: x / Bacteria: x      Arterial Blood Gas:  01-01 @ 21:31  7.42/59/78/38/96.6/11.4  ABG lactate: --        MICROBIOLOGY:     FluA/FluB/RSV/COVID PCR (01.01.25 @ 15:40)    SARS-CoV-2 Result: NotDetec: For Emergency Use Authorization  This Respiratory Panel uses polymerase chain reaction (PCR) to detect for  influenza A; influenza B; and SARS-CoV-2.  This test was validated [facility] and is in use under the FDA  Emergency Use Authorization (EUA) for clinical labs CLIA-certified to  perform high complexity testing. Test results should be correlated with  clinical presentation, patient history, and epidemiology.   Influenza A Result: NotDetec: For Emergency Use Authorization   Influenza B Result: NotDete: For Emergency Use Authorization   Resp Syn Virus Result: NotDetec: For Emergency Use Authorization    Legionella pneumophila Antigen, Urine (01.03.25 @ 06:30)    Legionella Antigen, Urine: Negative:           Urinalysis with Rflx Culture (01.01.25 @ 16:00)    Urine Appearance: Cloudy   Color: Dark Yellow   Specific Gravity: >1.030   pH Urine: 5.5   Protein, Urine: 100 mg/dL   Glucose Qualitative, Urine: Negative mg/dL   Ketone - Urine: Trace mg/dL   Blood, Urine: Moderate   Bilirubin: Negative   Urobilinogen: 1.0 mg/dL   Leukocyte Esterase Concentration: Trace   Nitrite: Positive    Culture - Blood (01.01.25 @ 15:40)    Specimen Source: .Blood BLOOD   Culture Results:   No growth at 24 hours    Culture - Blood (01.01.25 @ 15:30)    Specimen Source: .Blood BLOOD   Culture Results:   No growth at 24 hours      RADIOLOGY:    < from: Xray Chest 1 View- PORTABLE-Urgent (01.01.25 @ 15:58) >  FINDINGS:    Single frontal view of the chest demonstrates CHF. The cardiomediastinal   silhouette is enlarged. No acute osseous abnormalities. Overlying EKG   leads and wires are noted. Moderate-sized ventral hernia. Widened   mediastinum. The patient is rotated.    IMPRESSION: Mild CHF. Cardiomegaly. Can't exclude underlying pneumonia.   Consider chest CT.    --- End of Report ---    < end of copied text >        < from: US Duplex Venous Lower Ext Complete, Bilateral (01.01.25 @ 18:53) >  FINDINGS:    RIGHT:  Normal compressibility of the RIGHT common femoral, femoral and popliteal   veins.  Doppler examination shows normal spontaneous and phasic flow.  Calf veins were not able to be diagnostically visualized.    LEFT:  Normal compressibility of the LEFT common femoral, femoral and popliteal   veins.  Mid and lower femoral venous segments and popliteal vein were less well   visualized.  Doppler examination shows normal spontaneous and phasic flow.  Calf veins were not able to be diagnostically visualized.    IMPRESSION:  No evidence of deep venous thrombosis in either lower extremity.  Statement above in regards to the venous segments able to be   diagnostically examined as described.          --- End of Report ---    < end of copied text >

## 2025-01-03 NOTE — CONSULT NOTE ADULT - ASSESSMENT
Patient is a 84F, with PMHx of Dementia, mood disorder, former smoker, recurrent UTIs, breast cancer, who was sent from NH for respiratory distress. Wheezing on arrival, SpO2 was 93% on NRB mask. Hypercapnic initially requiring AVAPS. Admitted to medicine with PNA, UTI. Pulmonary consulted    DX: Acute hypoxemic/ hypercapnic respiratory failure, PNA, UTI    Reccs:    Bedside POCUS 1/3/2025:  - B- line predominant anteriorly  - LLL consolidation pattern  - No pleural effusions B/L  - Difficult cardiac windows, LV function appears overall preserved however LV free wall possible motion abnormality    - Acute hypoxemic/ hypercapnic respiratory failure likely in setting of PNA  - Patient does have elevation in serum bicarb from admission indicating likely chronic hypercapnia  - Possible OHS/JOSE R. Would continue with Nocturnal NIV  - C/W CAP coverage for now  - Legionella negative, Flu/ Covid/ RVP negative  - F/U Mycoplasma IgM, Full RVP, Sputum culture  - Wheezing on exam, would give duonebs q 6 for now  - Would hold off on corticosteroids for now    - No recorded history of COPD, Patient is confused, A&Ox1 unable to contribute to HPI.   - Initial serum pro-bnp elevated, AM pro BNP ordered F/U  result  - Check official TTE  - Rest of care as per primary team    Plan discussed with Dr. Lucas.   
A/P-  84 year old Female , with PMHx of Dementia, mood disorder, former smoker, recurrent UTIs, breast cancer, who was sent from NH for respiratory distress. She was given solumedrol 40, duoneb in the field. She was wheezing. Patient was saturating 93% on NRB mask.  (01 Jan 2025 18:47)    infectious Disease consult requested today to help with antibiotic management for pneumonia.    Afebrile  + leukocytosis of 17k ( was given solumedrol)  on NC  RVP- negative  + UA - + nitrates    Impression-  respiratory distress possibly copd exacerbation vs pneumonia  + UA    Plan-  check sputum cx  check for legionella and mycoplasma  advise to start patient on zithromax and ceftriaxone  d/c zosyn  await blood cx results ( was sent already)  monitor O2 sat and wbc and temp closely.    All labs and imaging and chart notes reviewed.     All above discussed with patient and patient verbalizes full understanding of all above and agrees with above plan of care.    d/w Hospitalist .    Thank you for this consultation.    Nidhi Araujo MD  Infectious Disease Attending    for any questions please do not hesitate to contact me either via teams or by calling [phone]

## 2025-01-03 NOTE — PROGRESS NOTE ADULT - SUBJECTIVE AND OBJECTIVE BOX
ROSY SPARROW  MRN-[id number]  84y (1940)    Follow Up:  PNA    Interval History: The pt was seen and examined earlier, not in acute distress, respirations are somewhat labored, on NC, and wheezing on exam, asked for respiratory therapy to come and give respiratory treatment. Pt is awake and alert, able to answer simple questions. Pt is afebrile, tachycardic, leukocytosis is better 14.95.    PAST MEDICAL & SURGICAL HISTORY:  HTN (hypertension)      Breast cancer      Dementia      Recurrent urinary tract infection      Mood disorder      H/O left mastectomy          ROS:    [ ] Unobtainable because:  [x ] All other systems negative    Constitutional: no fever, no chills  Head: no trauma  Eyes: no vision changes, no eye pain  ENT:  no sore throat, no rhinorrhea  Cardiovascular:  no chest pain, no palpitation  Respiratory:  no SOB with supplemental O2, + cough  GI:  no abd pain, no vomiting, no diarrhea  urinary: no dysuria, no hematuria, no flank pain  musculoskeletal:  no joint pain, no joint swelling  skin:  no rash  neurology:  no headache, no seizure, no change in mental status  psych: no anxiety, no depression         Allergies  No Known Allergies        ANTIMICROBIALS:  azithromycin   Tablet 500 every 24 hours  cefTRIAXone   IVPB 1000 every 24 hours      OTHER MEDS:  enoxaparin Injectable 40 milliGRAM(s) SubCutaneous every 24 hours  influenza  Vaccine (HIGH DOSE) 0.5 milliLiter(s) IntraMuscular once      Vital Signs Last 24 Hrs  T(C): 36.4 (03 Jan 2025 10:47), Max: 37.1 (02 Jan 2025 21:12)  T(F): 97.5 (03 Jan 2025 10:47), Max: 98.7 (02 Jan 2025 21:12)  HR: 96 (03 Jan 2025 12:02) (80 - 123)  BP: 171/100 (03 Jan 2025 10:47) (123/76 - 171/100)  BP(mean): --  RR: 20 (03 Jan 2025 10:47) (19 - 20)  SpO2: 93% (03 Jan 2025 12:02) (89% - 97%)    Parameters below as of 03 Jan 2025 10:47  Patient On (Oxygen Delivery Method): BiPAP/CPAP        Physical Exam:  General:    NAD, non toxic, NC, chronically ill appearing   Head: atraumatic, normocephalic  Eyes: normal sclera and conjunctiva  ENT:   no oropharyngeal lesions, no LAD, neck supple  Cardio:  tachycardic S1,S2  Respiratory:   + wheezing bilaterally, wet cough  abd:   soft, BS +, not tender, no distention  :     + Mei, no CVAT, no suprapubic tenderness  Musculoskeletal : no joint swelling, no edema  Skin:    no rash, b/l LEs skin discoloration present, venous stasis   Neurologic:   awake, alert, can answer simple questions and follow simple commands, slow to respond   psych: normal affect    WBC Count: 14.95 K/uL (01-03 @ 05:42)  WBC Count: 17.76 K/uL (01-02 @ 07:02)  WBC Count: 12.65 K/uL (01-01 @ 15:40)                            13.4   14.95 )-----------( 232      ( 03 Jan 2025 05:42 )             44.1       01-03    145  |  108  |  33[H]  ----------------------------<  97  4.1   |  34[H]  |  0.52    Ca    8.9      03 Jan 2025 05:42    TPro  6.8  /  Alb  2.4[L]  /  TBili  0.6  /  DBili  x   /  AST  27  /  ALT  34  /  AlkPhos  76  01-03      Urinalysis Basic - ( 03 Jan 2025 05:42 )    Color: x / Appearance: x / SG: x / pH: x  Gluc: 97 mg/dL / Ketone: x  / Bili: x / Urobili: x   Blood: x / Protein: x / Nitrite: x   Leuk Esterase: x / RBC: x / WBC x   Sq Epi: x / Non Sq Epi: x / Bacteria: x        Creatinine Trend: 0.52<--, 0.48<--, 0.52<--      MICROBIOLOGY:  v  .Blood BLOOD  01-01-25   No growth at 24 hours  --  --      .Blood BLOOD  01-01-25   No growth at 24 hours  --  --      SARS-CoV-2 Result: NotDetec (01-01-25 @ 15:40)      RADIOLOGY:     ROSY SPARROW  MRN-[id number]  84y (1940)    Follow Up:  PNA    Interval History: The pt was seen and examined earlier, not in acute distress, respirations are somewhat labored, on NC, and wheezing on exam, asked for respiratory therapy to come and give respiratory treatment. Pt is awake and alert, able to answer simple questions. Pt is afebrile, tachycardic, leukocytosis is better 14.95.    PAST MEDICAL & SURGICAL HISTORY:  HTN (hypertension)      Breast cancer      Dementia      Recurrent urinary tract infection      Mood disorder      H/O left mastectomy          ROS:    [ ] Unobtainable because:  [x ] All other systems negative    Constitutional: no fever, no chills  Head: no trauma  Eyes: no vision changes, no eye pain  ENT:  no sore throat, no rhinorrhea  Cardiovascular:  no chest pain, no palpitation  Respiratory:  no SOB with supplemental O2, + cough  GI:  no abd pain, no vomiting, no diarrhea  urinary: no dysuria, no hematuria, no flank pain  musculoskeletal:  no joint pain, no joint swelling  skin:  no rash  neurology:  no headache, no seizure, no change in mental status  psych: no anxiety, no depression         Allergies  No Known Allergies        ANTIMICROBIALS:  azithromycin   Tablet 500 every 24 hours  cefTRIAXone   IVPB 1000 every 24 hours      OTHER MEDS:  enoxaparin Injectable 40 milliGRAM(s) SubCutaneous every 24 hours  influenza  Vaccine (HIGH DOSE) 0.5 milliLiter(s) IntraMuscular once      Vital Signs Last 24 Hrs  T(C): 36.4 (03 Jan 2025 10:47), Max: 37.1 (02 Jan 2025 21:12)  T(F): 97.5 (03 Jan 2025 10:47), Max: 98.7 (02 Jan 2025 21:12)  HR: 96 (03 Jan 2025 12:02) (80 - 123)  BP: 171/100 (03 Jan 2025 10:47) (123/76 - 171/100)  BP(mean): --  RR: 20 (03 Jan 2025 10:47) (19 - 20)  SpO2: 93% (03 Jan 2025 12:02) (89% - 97%)    Parameters below as of 03 Jan 2025 10:47  Patient On (Oxygen Delivery Method): BiPAP/CPAP        Physical Exam:  General:    NAD, non toxic, NC, chronically ill appearing   Head: atraumatic, normocephalic  Eyes: normal sclera and conjunctiva  ENT:   no oropharyngeal lesions, no LAD, neck supple  Cardio:  tachycardic S1,S2  Respiratory:   + wheezing bilaterally, wet cough  abd:   soft, BS +, not tender, no distention  :     + Mei, no CVAT, no suprapubic tenderness  Musculoskeletal : no joint swelling, no edema  Skin:    no rash, b/l LEs skin discoloration present, venous stasis   Neurologic:   awake, alert, can answer simple questions and follow simple commands, slow to respond   psych: normal affect    WBC Count: 14.95 K/uL (01-03 @ 05:42)  WBC Count: 17.76 K/uL (01-02 @ 07:02)  WBC Count: 12.65 K/uL (01-01 @ 15:40)                            13.4   14.95 )-----------( 232      ( 03 Jan 2025 05:42 )             44.1       01-03    145  |  108  |  33[H]  ----------------------------<  97  4.1   |  34[H]  |  0.52    Ca    8.9      03 Jan 2025 05:42    TPro  6.8  /  Alb  2.4[L]  /  TBili  0.6  /  DBili  x   /  AST  27  /  ALT  34  /  AlkPhos  76  01-03      Urinalysis Basic - ( 03 Jan 2025 05:42 )    Color: x / Appearance: x / SG: x / pH: x  Gluc: 97 mg/dL / Ketone: x  / Bili: x / Urobili: x   Blood: x / Protein: x / Nitrite: x   Leuk Esterase: x / RBC: x / WBC x   Sq Epi: x / Non Sq Epi: x / Bacteria: x        Creatinine Trend: 0.52<--, 0.48<--, 0.52<--      MICROBIOLOGY:    .Blood BLOOD  01-01-25   No growth at 24 hours  --  --      .Blood BLOOD  01-01-25   No growth at 24 hours  --  --      SARS-CoV-2 Result: NotDetec (01-01-25 @ 15:40)      RADIOLOGY:

## 2025-01-03 NOTE — PROGRESS NOTE ADULT - ASSESSMENT
A/P-  84 year old Female , with PMHx of Dementia, mood disorder, former smoker, recurrent UTIs, breast cancer, who was sent from NH for respiratory distress. She was given solumedrol 40, duoneb in the field. She was wheezing. Patient was saturating 93% on NRB mask.  (01 Jan 2025 18:47)    infectious Disease consult requested today to help with antibiotic management for pneumonia.    Afebrile  + leukocytosis of 17k ( was given solumedrol)  on NC  RVP- negative  + UA - + nitrates    1/3: no fever, NC, wheezing on exam, asked for respiratory treatment, tachycardic, leukocytosis is better 14.95, Cr ok, BCs NGTD, no DVT detected, covid/influenza/RSV negative, Mycoplasma and Legionella urine ag are pending, Ceftriaxone IV and Azithromycin are continued.     Impression-  respiratory distress possibly copd exacerbation vs pneumonia  + UA    Plan-  check sputum cx - reached out to RN, asked to collect  check for legionella and mycoplasma - pending   continue zithromax and ceftriaxone ( day #2)  follow all culture data   monitor O2 sat and wbc and temp closely  pulmonology consult    discussed with Dr. Araujo   discussed with Dr. Gonzalez

## 2025-01-03 NOTE — PROGRESS NOTE ADULT - PROBLEM SELECTOR PLAN 1
- P/w temp 101.7, WBC 12.65  - Lactate 1.3  - CXR roughly clear on wet read  - UA positive  - F/u cultures- NGTD  - ID consult placed- c/w CTX/AZT

## 2025-01-03 NOTE — PROGRESS NOTE ADULT - SUBJECTIVE AND OBJECTIVE BOX
Patient is a 84y old  Female who presents with a chief complaint of Sepsis, acute respiratory failure with hypoxia and hypercapnia (02 Jan 2025 13:58)    INTERVAL HPI/OVERNIGHT EVENTS: No acute events overnight. HD stable.     MEDICATIONS  (STANDING):  azithromycin   Tablet 500 milliGRAM(s) Oral every 24 hours  cefTRIAXone   IVPB 1000 milliGRAM(s) IV Intermittent every 24 hours  enoxaparin Injectable 40 milliGRAM(s) SubCutaneous every 24 hours  influenza  Vaccine (HIGH DOSE) 0.5 milliLiter(s) IntraMuscular once    MEDICATIONS  (PRN):      Allergies    No Known Allergies    Intolerances        REVIEW OF SYSTEMS: all negative with exception of above    Vital Signs Last 24 Hrs  T(C): 36.4 (03 Jan 2025 10:47), Max: 37.1 (02 Jan 2025 21:12)  T(F): 97.5 (03 Jan 2025 10:47), Max: 98.7 (02 Jan 2025 21:12)  HR: 123 (03 Jan 2025 10:47) (80 - 123)  BP: 171/100 (03 Jan 2025 10:47) (123/76 - 171/100)  BP(mean): --  RR: 20 (03 Jan 2025 10:47) (19 - 20)  SpO2: 90% (03 Jan 2025 10:47) (89% - 97%)    Parameters below as of 03 Jan 2025 10:47  Patient On (Oxygen Delivery Method): BiPAP/CPAP    PHYSICAL EXAM:  GENERAL: NAD, well-groomed  NERVOUS SYSTEM:  Alert & Oriented X3, Good concentration; Motor Strength 5/5 B/L upper and lower extremities; DTRs 2+ intact and symmetric  CHEST/LUNG: Clear to percussion bilaterally; No rales, rhonchi, wheezing, or rubs  HEART: Regular rate and rhythm; No murmurs, rubs, or gallops  ABDOMEN: Soft, Nontender, Nondistended; Bowel sounds present  EXTREMITIES:  2+ Peripheral Pulses, No clubbing, cyanosis, or edema    LABS:                        13.4   14.95 )-----------( 232      ( 03 Jan 2025 05:42 )             44.1     01-03    145  |  108  |  33[H]  ----------------------------<  97  4.1   |  34[H]  |  0.52    Ca    8.9      03 Jan 2025 05:42    TPro  6.8  /  Alb  2.4[L]  /  TBili  0.6  /  DBili  x   /  AST  27  /  ALT  34  /  AlkPhos  76  01-03    PT/INR - ( 01 Jan 2025 15:40 )   PT: 14.2 sec;   INR: 1.23 ratio         PTT - ( 01 Jan 2025 15:40 )  PTT:31.5 sec  Urinalysis Basic - ( 03 Jan 2025 05:42 )    Color: x / Appearance: x / SG: x / pH: x  Gluc: 97 mg/dL / Ketone: x  / Bili: x / Urobili: x   Blood: x / Protein: x / Nitrite: x   Leuk Esterase: x / RBC: x / WBC x   Sq Epi: x / Non Sq Epi: x / Bacteria: x      CAPILLARY BLOOD GLUCOSE          RADIOLOGY & ADDITIONAL TESTS:    Imaging Personally Reviewed:  [ ] YES  [ ] NO    Consultant(s) Notes Reviewed:  [ ] YES  [ ] NO    Care Discussed with Consultants/Other Providers [ ] YES  [ ] NO

## 2025-01-04 LAB
ALBUMIN SERPL ELPH-MCNC: 2.3 G/DL — LOW (ref 3.3–5)
ALP SERPL-CCNC: 70 U/L — SIGNIFICANT CHANGE UP (ref 40–120)
ALT FLD-CCNC: 57 U/L — SIGNIFICANT CHANGE UP (ref 12–78)
ANION GAP SERPL CALC-SCNC: 3 MMOL/L — LOW (ref 5–17)
AST SERPL-CCNC: 32 U/L — SIGNIFICANT CHANGE UP (ref 15–37)
BILIRUB SERPL-MCNC: 0.9 MG/DL — SIGNIFICANT CHANGE UP (ref 0.2–1.2)
BUN SERPL-MCNC: 21 MG/DL — SIGNIFICANT CHANGE UP (ref 7–23)
CALCIUM SERPL-MCNC: 8.4 MG/DL — LOW (ref 8.5–10.1)
CHLORIDE SERPL-SCNC: 108 MMOL/L — SIGNIFICANT CHANGE UP (ref 96–108)
CO2 SERPL-SCNC: 37 MMOL/L — HIGH (ref 22–31)
CREAT SERPL-MCNC: 0.44 MG/DL — LOW (ref 0.5–1.3)
EGFR: 94 ML/MIN/1.73M2 — SIGNIFICANT CHANGE UP
GLUCOSE SERPL-MCNC: 124 MG/DL — HIGH (ref 70–99)
HCT VFR BLD CALC: 45.6 % — HIGH (ref 34.5–45)
HGB BLD-MCNC: 13.7 G/DL — SIGNIFICANT CHANGE UP (ref 11.5–15.5)
MAGNESIUM SERPL-MCNC: 2.1 MG/DL — SIGNIFICANT CHANGE UP (ref 1.6–2.6)
MCHC RBC-ENTMCNC: 29 PG — SIGNIFICANT CHANGE UP (ref 27–34)
MCHC RBC-ENTMCNC: 30 G/DL — LOW (ref 32–36)
MCV RBC AUTO: 96.6 FL — SIGNIFICANT CHANGE UP (ref 80–100)
NRBC # BLD: 0 /100 WBCS — SIGNIFICANT CHANGE UP (ref 0–0)
NT-PROBNP SERPL-SCNC: 3385 PG/ML — HIGH (ref 0–450)
PHOSPHATE SERPL-MCNC: 2.9 MG/DL — SIGNIFICANT CHANGE UP (ref 2.5–4.5)
PLATELET # BLD AUTO: 233 K/UL — SIGNIFICANT CHANGE UP (ref 150–400)
POTASSIUM SERPL-MCNC: 3.4 MMOL/L — LOW (ref 3.5–5.3)
POTASSIUM SERPL-SCNC: 3.4 MMOL/L — LOW (ref 3.5–5.3)
PROT SERPL-MCNC: 6.8 GM/DL — SIGNIFICANT CHANGE UP (ref 6–8.3)
RAPID RVP RESULT: SIGNIFICANT CHANGE UP
RBC # BLD: 4.72 M/UL — SIGNIFICANT CHANGE UP (ref 3.8–5.2)
RBC # FLD: 14.5 % — SIGNIFICANT CHANGE UP (ref 10.3–14.5)
SARS-COV-2 RNA SPEC QL NAA+PROBE: SIGNIFICANT CHANGE UP
SODIUM SERPL-SCNC: 148 MMOL/L — HIGH (ref 135–145)
WBC # BLD: 11.27 K/UL — HIGH (ref 3.8–10.5)
WBC # FLD AUTO: 11.27 K/UL — HIGH (ref 3.8–10.5)

## 2025-01-04 PROCEDURE — 99232 SBSQ HOSP IP/OBS MODERATE 35: CPT

## 2025-01-04 RX ORDER — ACETAMINOPHEN 80 MG/.8ML
1000 SOLUTION/ DROPS ORAL ONCE
Refills: 0 | Status: COMPLETED | OUTPATIENT
Start: 2025-01-04 | End: 2025-01-04

## 2025-01-04 RX ORDER — POTASSIUM CHLORIDE 600 MG/1
40 TABLET, FILM COATED, EXTENDED RELEASE ORAL ONCE
Refills: 0 | Status: COMPLETED | OUTPATIENT
Start: 2025-01-04 | End: 2025-01-04

## 2025-01-04 RX ORDER — SODIUM CHLORIDE 9 MG/ML
1000 INJECTION, SOLUTION INTRAVENOUS
Refills: 0 | Status: DISCONTINUED | OUTPATIENT
Start: 2025-01-04 | End: 2025-01-07

## 2025-01-04 RX ORDER — LABETALOL HCL 300 MG/1
5 TABLET, FILM COATED ORAL ONCE
Refills: 0 | Status: COMPLETED | OUTPATIENT
Start: 2025-01-04 | End: 2025-01-04

## 2025-01-04 RX ADMIN — IPRATROPIUM BROMIDE AND ALBUTEROL SULFATE 3 MILLILITER(S): .5; 2.5 SOLUTION RESPIRATORY (INHALATION) at 11:33

## 2025-01-04 RX ADMIN — IPRATROPIUM BROMIDE AND ALBUTEROL SULFATE 3 MILLILITER(S): .5; 2.5 SOLUTION RESPIRATORY (INHALATION) at 05:31

## 2025-01-04 RX ADMIN — POTASSIUM CHLORIDE 40 MILLIEQUIVALENT(S): 600 TABLET, FILM COATED, EXTENDED RELEASE ORAL at 09:24

## 2025-01-04 RX ADMIN — ENOXAPARIN SODIUM 40 MILLIGRAM(S): 60 INJECTION INTRAVENOUS; SUBCUTANEOUS at 05:04

## 2025-01-04 RX ADMIN — ACETAMINOPHEN 400 MILLIGRAM(S): 80 SOLUTION/ DROPS ORAL at 05:04

## 2025-01-04 RX ADMIN — IPRATROPIUM BROMIDE AND ALBUTEROL SULFATE 3 MILLILITER(S): .5; 2.5 SOLUTION RESPIRATORY (INHALATION) at 23:09

## 2025-01-04 RX ADMIN — ACETAMINOPHEN 400 MILLIGRAM(S): 80 SOLUTION/ DROPS ORAL at 18:56

## 2025-01-04 RX ADMIN — ACETAMINOPHEN 1000 MILLIGRAM(S): 80 SOLUTION/ DROPS ORAL at 06:32

## 2025-01-04 RX ADMIN — LABETALOL HCL 5 MILLIGRAM(S): 300 TABLET, FILM COATED ORAL at 00:23

## 2025-01-04 RX ADMIN — ACETAMINOPHEN 1000 MILLIGRAM(S): 80 SOLUTION/ DROPS ORAL at 19:30

## 2025-01-04 RX ADMIN — AZITHROMYCIN MONOHYDRATE 500 MILLIGRAM(S): 200 POWDER, FOR SUSPENSION ORAL at 18:37

## 2025-01-04 RX ADMIN — IPRATROPIUM BROMIDE AND ALBUTEROL SULFATE 3 MILLILITER(S): .5; 2.5 SOLUTION RESPIRATORY (INHALATION) at 17:45

## 2025-01-04 RX ADMIN — SODIUM CHLORIDE 75 MILLILITER(S): 9 INJECTION, SOLUTION INTRAVENOUS at 18:19

## 2025-01-04 RX ADMIN — CEFTRIAXONE SODIUM 100 MILLIGRAM(S): 1 INJECTION, POWDER, FOR SOLUTION INTRAMUSCULAR; INTRAVENOUS at 22:52

## 2025-01-04 NOTE — PROGRESS NOTE ADULT - PROBLEM SELECTOR PLAN 2
- Acute hypoxemic/ hypercapnic respiratory failure likely in setting of PNA  - Possible OHS/JOSE R. Would continue with Nocturnal NIV  - C/W abx for PNA  - Legionella negative, Flu/ Covid/ RVP negative, legionella neg  - No recorded history of COPD  - TTE with preserved EF, indeterminate diastolic function

## 2025-01-04 NOTE — DIETITIAN INITIAL EVALUATION ADULT - PERTINENT LABORATORY DATA
01-04    148[H]  |  108  |  21  ----------------------------<  124[H]  3.4[L]   |  37[H]  |  0.44[L]    Ca    8.4[L]      04 Jan 2025 05:50  Phos  2.9     01-04  Mg     2.1     01-04    TPro  6.8  /  Alb  2.3[L]  /  TBili  0.9  /  DBili  x   /  AST  32  /  ALT  57  /  AlkPhos  70  01-04

## 2025-01-04 NOTE — DIETITIAN INITIAL EVALUATION ADULT - PERTINENT MEDS FT
MEDICATIONS  (STANDING):  albuterol/ipratropium for Nebulization 3 milliLiter(s) Nebulizer every 6 hours  azithromycin   Tablet 500 milliGRAM(s) Oral every 24 hours  cefTRIAXone   IVPB 1000 milliGRAM(s) IV Intermittent every 24 hours  enoxaparin Injectable 40 milliGRAM(s) SubCutaneous every 24 hours  influenza  Vaccine (HIGH DOSE) 0.5 milliLiter(s) IntraMuscular once    MEDICATIONS  (PRN):

## 2025-01-04 NOTE — DIETITIAN INITIAL EVALUATION ADULT - EDUCATION DIETARY MODIFICATIONS
not applicable/unable to verbalize/demonstrate

## 2025-01-04 NOTE — PROGRESS NOTE ADULT - SUBJECTIVE AND OBJECTIVE BOX
Patient is a 84y old  Female who presents with a chief complaint of ACUTE RESPIRATORY FAILURE WITH HYPOXIA AND HYPERCAPNIA (04 Jan 2025 10:52)      INTERVAL HPI/OVERNIGHT EVENTS:  Pt was seen and examined, no acute events.      MEDICATIONS  (STANDING):  albuterol/ipratropium for Nebulization 3 milliLiter(s) Nebulizer every 6 hours  azithromycin   Tablet 500 milliGRAM(s) Oral every 24 hours  cefTRIAXone   IVPB 1000 milliGRAM(s) IV Intermittent every 24 hours  dextrose 5%. 1000 milliLiter(s) (75 mL/Hr) IV Continuous <Continuous>  enoxaparin Injectable 40 milliGRAM(s) SubCutaneous every 24 hours  influenza  Vaccine (HIGH DOSE) 0.5 milliLiter(s) IntraMuscular once    MEDICATIONS  (PRN):      Allergies  No Known Allergies        Vital Signs Last 24 Hrs  T(C): 36.8 (04 Jan 2025 16:31), Max: 38.5 (04 Jan 2025 04:46)  T(F): 98.3 (04 Jan 2025 16:31), Max: 101.3 (04 Jan 2025 04:46)  HR: 96 (04 Jan 2025 16:31) (79 - 101)  BP: 173/83 (04 Jan 2025 16:31) (146/82 - 177/95)  BP(mean): --  RR: 18 (04 Jan 2025 16:31) (18 - 19)  SpO2: 96% (04 Jan 2025 16:31) (91% - 97%)    Parameters below as of 04 Jan 2025 16:31  Patient On (Oxygen Delivery Method): BiPAP/CPAP        PHYSICAL EXAM:  GENERAL: NAD  NERVOUS SYSTEM:  Awake, Alert, non focal  CHEST/LUNG: +wheezing, on NC  HEART: RRR, S1, S2  ABDOMEN: Soft, Nontender, Nondistended; Bowel sounds present  EXTREMITIES:  2+ Peripheral Pulses, No clubbing, cyanosis, or edema        LABS:                        13.7   11.27 )-----------( 233      ( 04 Jan 2025 05:50 )             45.6     01-04    148[H]  |  108  |  21  ----------------------------<  124[H]  3.4[L]   |  37[H]  |  0.44[L]    Ca    8.4[L]      04 Jan 2025 05:50  Phos  2.9     01-04  Mg     2.1     01-04    TPro  6.8  /  Alb  2.3[L]  /  TBili  0.9  /  DBili  x   /  AST  32  /  ALT  57  /  AlkPhos  70  01-04      Urinalysis Basic - ( 04 Jan 2025 05:50 )    Color: x / Appearance: x / SG: x / pH: x  Gluc: 124 mg/dL / Ketone: x  / Bili: x / Urobili: x   Blood: x / Protein: x / Nitrite: x   Leuk Esterase: x / RBC: x / WBC x   Sq Epi: x / Non Sq Epi: x / Bacteria: x      CAPILLARY BLOOD GLUCOSE          Urinalysis with Rflx Culture (collected 01 Jan 2025 16:00)    Culture - Blood (collected 01 Jan 2025 15:40)  Source: .Blood BLOOD  Preliminary Report (04 Jan 2025 05:01):    No growth at 48 Hours    Culture - Blood (collected 01 Jan 2025 15:30)  Source: .Blood BLOOD  Preliminary Report (04 Jan 2025 05:01):    No growth at 48 Hours      RADIOLOGY & ADDITIONAL TESTS:    Imaging Personally Reviewed:  [ ] YES  [ ] NO    Consultant(s) Notes Reviewed:  [ ] YES  [ ] NO    Care Discussed with Consultants/Other Providers [ ] YES  [ ] NO

## 2025-01-04 NOTE — PROGRESS NOTE ADULT - PROBLEM SELECTOR PLAN 1
- 2/2 PNA  - F/u blood cultures- NGTD  - ID consult placed- c/w CTX/AZT  - Legionella negative, Flu/ Covid/ RVP negative, legionella neg - 2/2 PNA  - F/u blood cultures- NGTD  - ID consult placed- c/w CTX/AZT  - Legionella negative, Flu/ Covid/ RVP negative, legionella neg  - Febrile early am

## 2025-01-05 LAB
MRSA PCR RESULT.: SIGNIFICANT CHANGE UP
S AUREUS DNA NOSE QL NAA+PROBE: SIGNIFICANT CHANGE UP

## 2025-01-05 PROCEDURE — 99232 SBSQ HOSP IP/OBS MODERATE 35: CPT

## 2025-01-05 RX ADMIN — SODIUM CHLORIDE 75 MILLILITER(S): 9 INJECTION, SOLUTION INTRAVENOUS at 20:13

## 2025-01-05 RX ADMIN — SODIUM CHLORIDE 75 MILLILITER(S): 9 INJECTION, SOLUTION INTRAVENOUS at 17:59

## 2025-01-05 RX ADMIN — CEFTRIAXONE SODIUM 100 MILLIGRAM(S): 1 INJECTION, POWDER, FOR SOLUTION INTRAMUSCULAR; INTRAVENOUS at 22:00

## 2025-01-05 RX ADMIN — SODIUM CHLORIDE 75 MILLILITER(S): 9 INJECTION, SOLUTION INTRAVENOUS at 10:14

## 2025-01-05 RX ADMIN — IPRATROPIUM BROMIDE AND ALBUTEROL SULFATE 3 MILLILITER(S): .5; 2.5 SOLUTION RESPIRATORY (INHALATION) at 17:19

## 2025-01-05 RX ADMIN — ENOXAPARIN SODIUM 40 MILLIGRAM(S): 60 INJECTION INTRAVENOUS; SUBCUTANEOUS at 05:54

## 2025-01-05 RX ADMIN — IPRATROPIUM BROMIDE AND ALBUTEROL SULFATE 3 MILLILITER(S): .5; 2.5 SOLUTION RESPIRATORY (INHALATION) at 05:03

## 2025-01-05 RX ADMIN — IPRATROPIUM BROMIDE AND ALBUTEROL SULFATE 3 MILLILITER(S): .5; 2.5 SOLUTION RESPIRATORY (INHALATION) at 23:46

## 2025-01-05 RX ADMIN — AZITHROMYCIN MONOHYDRATE 500 MILLIGRAM(S): 200 POWDER, FOR SUSPENSION ORAL at 19:24

## 2025-01-05 RX ADMIN — IPRATROPIUM BROMIDE AND ALBUTEROL SULFATE 3 MILLILITER(S): .5; 2.5 SOLUTION RESPIRATORY (INHALATION) at 11:31

## 2025-01-05 NOTE — PROGRESS NOTE ADULT - SUBJECTIVE AND OBJECTIVE BOX
Patient is a 84y old  Female who presents with a chief complaint of Sepsis, acute respiratory failure with hypoxia and hypercapnia (04 Jan 2025 17:35)      INTERVAL HPI/OVERNIGHT EVENTS:  Pt was seen and examined, no acute events.    MEDICATIONS  (STANDING):  albuterol/ipratropium for Nebulization 3 milliLiter(s) Nebulizer every 6 hours  azithromycin   Tablet 500 milliGRAM(s) Oral every 24 hours  cefTRIAXone   IVPB 1000 milliGRAM(s) IV Intermittent every 24 hours  dextrose 5%. 1000 milliLiter(s) (75 mL/Hr) IV Continuous <Continuous>  enoxaparin Injectable 40 milliGRAM(s) SubCutaneous every 24 hours  influenza  Vaccine (HIGH DOSE) 0.5 milliLiter(s) IntraMuscular once    MEDICATIONS  (PRN):      Allergies    No Known Allergies    Intolerances          Vital Signs Last 24 Hrs  T(C): 37.2 (05 Jan 2025 16:21), Max: 37.4 (04 Jan 2025 23:34)  T(F): 99 (05 Jan 2025 16:21), Max: 99.4 (04 Jan 2025 23:34)  HR: 90 (05 Jan 2025 17:53) (78 - 100)  BP: 152/84 (05 Jan 2025 16:21) (152/84 - 166/73)  BP(mean): --  RR: 18 (05 Jan 2025 16:21) (17 - 18)  SpO2: 95% (05 Jan 2025 17:53) (91% - 96%)    Parameters below as of 05 Jan 2025 16:21  Patient On (Oxygen Delivery Method): nasal cannula  O2 Flow (L/min): 4      PHYSICAL EXAM:  GENERAL: NAD  NERVOUS SYSTEM:  Awake, Alert, non focal  CHEST/LUNG: +wheezing, on NC  HEART: RRR, S1, S2  ABDOMEN: Soft, Nontender, Nondistended; Bowel sounds present  EXTREMITIES:  2+ Peripheral Pulses, No clubbing, cyanosis, or edema      LABS:                        13.7   11.27 )-----------( 233      ( 04 Jan 2025 05:50 )             45.6     01-04    148[H]  |  108  |  21  ----------------------------<  124[H]  3.4[L]   |  37[H]  |  0.44[L]    Ca    8.4[L]      04 Jan 2025 05:50  Phos  2.9     01-04  Mg     2.1     01-04    TPro  6.8  /  Alb  2.3[L]  /  TBili  0.9  /  DBili  x   /  AST  32  /  ALT  57  /  AlkPhos  70  01-04      Urinalysis Basic - ( 04 Jan 2025 05:50 )    Color: x / Appearance: x / SG: x / pH: x  Gluc: 124 mg/dL / Ketone: x  / Bili: x / Urobili: x   Blood: x / Protein: x / Nitrite: x   Leuk Esterase: x / RBC: x / WBC x   Sq Epi: x / Non Sq Epi: x / Bacteria: x      CAPILLARY BLOOD GLUCOSE      Urinalysis with Rflx Culture (collected 01 Jan 2025 16:00)    Culture - Blood (collected 01 Jan 2025 15:40)  Source: .Blood BLOOD  Preliminary Report (05 Jan 2025 05:01):    No growth at 72 Hours    Culture - Blood (collected 01 Jan 2025 15:30)  Source: .Blood BLOOD  Preliminary Report (05 Jan 2025 05:01):    No growth at 72 Hours      RADIOLOGY & ADDITIONAL TESTS:    Imaging Personally Reviewed:  [ ] YES  [ ] NO    Consultant(s) Notes Reviewed:  [ ] YES  [ ] NO    Care Discussed with Consultants/Other Providers [ ] YES  [ ] NO

## 2025-01-05 NOTE — PROGRESS NOTE ADULT - PROBLEM SELECTOR PLAN 1
- 2/2 PNA  - F/u blood cultures- NGTD  - ID consult placed- c/w CTX/AZT  - Legionella negative, Flu/ Covid/ RVP negative, legionella neg  - Febrile yesterday, today low grade temp

## 2025-01-06 LAB
ANION GAP SERPL CALC-SCNC: 0 MMOL/L — LOW (ref 5–17)
ANION GAP SERPL CALC-SCNC: 1 MMOL/L — LOW (ref 5–17)
BASOPHILS # BLD AUTO: 0.05 K/UL — SIGNIFICANT CHANGE UP (ref 0–0.2)
BASOPHILS NFR BLD AUTO: 0.4 % — SIGNIFICANT CHANGE UP (ref 0–2)
BUN SERPL-MCNC: 16 MG/DL — SIGNIFICANT CHANGE UP (ref 7–23)
BUN SERPL-MCNC: 18 MG/DL — SIGNIFICANT CHANGE UP (ref 7–23)
CALCIUM SERPL-MCNC: 8.6 MG/DL — SIGNIFICANT CHANGE UP (ref 8.5–10.1)
CALCIUM SERPL-MCNC: 8.7 MG/DL — SIGNIFICANT CHANGE UP (ref 8.5–10.1)
CHLORIDE SERPL-SCNC: 103 MMOL/L — SIGNIFICANT CHANGE UP (ref 96–108)
CHLORIDE SERPL-SCNC: 103 MMOL/L — SIGNIFICANT CHANGE UP (ref 96–108)
CO2 SERPL-SCNC: 37 MMOL/L — HIGH (ref 22–31)
CO2 SERPL-SCNC: 40 MMOL/L — HIGH (ref 22–31)
CREAT SERPL-MCNC: 0.36 MG/DL — LOW (ref 0.5–1.3)
CREAT SERPL-MCNC: 0.39 MG/DL — LOW (ref 0.5–1.3)
EGFR: 97 ML/MIN/1.73M2 — SIGNIFICANT CHANGE UP
EGFR: 99 ML/MIN/1.73M2 — SIGNIFICANT CHANGE UP
EOSINOPHIL # BLD AUTO: 0.77 K/UL — HIGH (ref 0–0.5)
EOSINOPHIL NFR BLD AUTO: 6.4 % — HIGH (ref 0–6)
GLUCOSE SERPL-MCNC: 106 MG/DL — HIGH (ref 70–99)
GLUCOSE SERPL-MCNC: 126 MG/DL — HIGH (ref 70–99)
GRAM STN FLD: ABNORMAL
HCT VFR BLD CALC: 47 % — HIGH (ref 34.5–45)
HGB BLD-MCNC: 14 G/DL — SIGNIFICANT CHANGE UP (ref 11.5–15.5)
IMM GRANULOCYTES NFR BLD AUTO: 0.6 % — SIGNIFICANT CHANGE UP (ref 0–0.9)
LYMPHOCYTES # BLD AUTO: 1.22 K/UL — SIGNIFICANT CHANGE UP (ref 1–3.3)
LYMPHOCYTES # BLD AUTO: 10.2 % — LOW (ref 13–44)
MAGNESIUM SERPL-MCNC: 2.1 MG/DL — SIGNIFICANT CHANGE UP (ref 1.6–2.6)
MAGNESIUM SERPL-MCNC: 2.2 MG/DL — SIGNIFICANT CHANGE UP (ref 1.6–2.6)
MCHC RBC-ENTMCNC: 28.6 PG — SIGNIFICANT CHANGE UP (ref 27–34)
MCHC RBC-ENTMCNC: 29.8 G/DL — LOW (ref 32–36)
MCV RBC AUTO: 96.1 FL — SIGNIFICANT CHANGE UP (ref 80–100)
MONOCYTES # BLD AUTO: 0.8 K/UL — SIGNIFICANT CHANGE UP (ref 0–0.9)
MONOCYTES NFR BLD AUTO: 6.7 % — SIGNIFICANT CHANGE UP (ref 2–14)
NEUTROPHILS # BLD AUTO: 9.09 K/UL — HIGH (ref 1.8–7.4)
NEUTROPHILS NFR BLD AUTO: 75.7 % — SIGNIFICANT CHANGE UP (ref 43–77)
NRBC # BLD: 0 /100 WBCS — SIGNIFICANT CHANGE UP (ref 0–0)
PHOSPHATE SERPL-MCNC: 2.4 MG/DL — LOW (ref 2.5–4.5)
PHOSPHATE SERPL-MCNC: 2.7 MG/DL — SIGNIFICANT CHANGE UP (ref 2.5–4.5)
PLATELET # BLD AUTO: 209 K/UL — SIGNIFICANT CHANGE UP (ref 150–400)
POTASSIUM SERPL-MCNC: 3.5 MMOL/L — SIGNIFICANT CHANGE UP (ref 3.5–5.3)
POTASSIUM SERPL-MCNC: 4.3 MMOL/L — SIGNIFICANT CHANGE UP (ref 3.5–5.3)
POTASSIUM SERPL-SCNC: 3.5 MMOL/L — SIGNIFICANT CHANGE UP (ref 3.5–5.3)
POTASSIUM SERPL-SCNC: 4.3 MMOL/L — SIGNIFICANT CHANGE UP (ref 3.5–5.3)
RBC # BLD: 4.89 M/UL — SIGNIFICANT CHANGE UP (ref 3.8–5.2)
RBC # FLD: 14 % — SIGNIFICANT CHANGE UP (ref 10.3–14.5)
SODIUM SERPL-SCNC: 141 MMOL/L — SIGNIFICANT CHANGE UP (ref 135–145)
SODIUM SERPL-SCNC: 143 MMOL/L — SIGNIFICANT CHANGE UP (ref 135–145)
SPECIMEN SOURCE: SIGNIFICANT CHANGE UP
WBC # BLD: 12 K/UL — HIGH (ref 3.8–10.5)
WBC # FLD AUTO: 12 K/UL — HIGH (ref 3.8–10.5)

## 2025-01-06 PROCEDURE — 99231 SBSQ HOSP IP/OBS SF/LOW 25: CPT | Mod: FS

## 2025-01-06 PROCEDURE — 99232 SBSQ HOSP IP/OBS MODERATE 35: CPT

## 2025-01-06 PROCEDURE — 99233 SBSQ HOSP IP/OBS HIGH 50: CPT

## 2025-01-06 PROCEDURE — G0545: CPT

## 2025-01-06 RX ORDER — METOPROLOL TARTRATE 50 MG
25 TABLET ORAL
Refills: 0 | Status: DISCONTINUED | OUTPATIENT
Start: 2025-01-06 | End: 2025-01-08

## 2025-01-06 RX ORDER — IPRATROPIUM BROMIDE AND ALBUTEROL SULFATE .5; 2.5 MG/3ML; MG/3ML
3 SOLUTION RESPIRATORY (INHALATION) EVERY 6 HOURS
Refills: 0 | Status: DISCONTINUED | OUTPATIENT
Start: 2025-01-06 | End: 2025-01-08

## 2025-01-06 RX ORDER — POTASSIUM CHLORIDE 600 MG/1
10 TABLET, FILM COATED, EXTENDED RELEASE ORAL
Refills: 0 | Status: COMPLETED | OUTPATIENT
Start: 2025-01-06 | End: 2025-01-06

## 2025-01-06 RX ORDER — FUROSEMIDE 20 MG
20 TABLET ORAL ONCE
Refills: 0 | Status: COMPLETED | OUTPATIENT
Start: 2025-01-06 | End: 2025-01-06

## 2025-01-06 RX ADMIN — SODIUM CHLORIDE 75 MILLILITER(S): 9 INJECTION, SOLUTION INTRAVENOUS at 04:40

## 2025-01-06 RX ADMIN — POTASSIUM CHLORIDE 100 MILLIEQUIVALENT(S): 600 TABLET, FILM COATED, EXTENDED RELEASE ORAL at 02:39

## 2025-01-06 RX ADMIN — ENOXAPARIN SODIUM 40 MILLIGRAM(S): 60 INJECTION INTRAVENOUS; SUBCUTANEOUS at 05:50

## 2025-01-06 RX ADMIN — SODIUM CHLORIDE 75 MILLILITER(S): 9 INJECTION, SOLUTION INTRAVENOUS at 13:49

## 2025-01-06 RX ADMIN — CEFTRIAXONE SODIUM 100 MILLIGRAM(S): 1 INJECTION, POWDER, FOR SOLUTION INTRAMUSCULAR; INTRAVENOUS at 21:00

## 2025-01-06 RX ADMIN — Medication 20 MILLIGRAM(S): at 17:49

## 2025-01-06 RX ADMIN — Medication 25 MILLIGRAM(S): at 17:49

## 2025-01-06 RX ADMIN — POTASSIUM CHLORIDE 100 MILLIEQUIVALENT(S): 600 TABLET, FILM COATED, EXTENDED RELEASE ORAL at 03:51

## 2025-01-06 RX ADMIN — IPRATROPIUM BROMIDE AND ALBUTEROL SULFATE 3 MILLILITER(S): .5; 2.5 SOLUTION RESPIRATORY (INHALATION) at 11:33

## 2025-01-06 RX ADMIN — IPRATROPIUM BROMIDE AND ALBUTEROL SULFATE 3 MILLILITER(S): .5; 2.5 SOLUTION RESPIRATORY (INHALATION) at 05:30

## 2025-01-06 RX ADMIN — POTASSIUM CHLORIDE 100 MILLIEQUIVALENT(S): 600 TABLET, FILM COATED, EXTENDED RELEASE ORAL at 05:01

## 2025-01-06 NOTE — PROGRESS NOTE ADULT - ASSESSMENT
Patient is a 84F, with PMHx of Dementia, mood disorder, former smoker, recurrent UTIs, breast cancer, who was sent from NH for respiratory distress. Wheezing on arrival, SpO2 was 93% on NRB mask. Hypercapnic initially requiring AVAPS. Admitted to medicine with PNA, UTI. Pulmonary consulted    DX: Acute hypoxemic/ hypercapnic respiratory failure, PNA, UTI    Reccs:    - Acute hypoxemic/ hypercapnic respiratory failure likely in setting of PNA  - likely  OHS/JOSE R. Would continue with Nocturnal NIV and prn  - C/W CAP coverage for now  - Legionella negative, Flu/ Covid/ RVP negative  - F/U Mycoplasma IgM, Full RVP, Sputum culture  - Wheezing on exam, would give duonebs q 6 for now  - Would hold off on corticosteroids for now    - TTE with nl LV function  - DNR/I  - Rest of care as per primary team    note incomplete Patient is a 84F, with PMHx of Dementia, mood disorder, former smoker, recurrent UTIs, breast cancer, who was sent from NH for respiratory distress. Wheezing on arrival, SpO2 was 93% on NRB mask. Hypercapnic initially requiring AVAPS. Admitted to medicine with PNA, UTI. Pulmonary consulted    DX: Acute hypoxemic/ hypercapnic respiratory failure, PNA, UTI    Reccs:    - Acute hypoxemic/ hypercapnic respiratory failure likely in setting of PNA  -satting 92% on 4LNC. wean as able   - likely  OHS/JOSE R. Would continue with Nocturnal NIV and prn  - C/W CAP coverage for now  - Legionella negative, Flu/ Covid/ RVP negative  - F/U Mycoplasma IgM, Full RVP, Sputum culture  - wheezing improved. will make duonebs prn  - TTE with nl LV function  - DNR/I  - Rest of care as per primary team    d/w dr jones

## 2025-01-06 NOTE — PROGRESS NOTE ADULT - PROBLEM SELECTOR PLAN 1
- 2/2 PNA  - F/u blood cultures- NGTD  - ID consult placed- c/w CTX/AZT  - Legionella negative, Flu/ Covid/ RVP negative, legionella neg  - Afebrile

## 2025-01-06 NOTE — PROGRESS NOTE ADULT - ASSESSMENT
A/P-  84 year old Female , with PMHx of Dementia, mood disorder, former smoker, recurrent UTIs, breast cancer, who was sent from NH for respiratory distress. She was given solumedrol 40, duoneb in the field. She was wheezing. Patient was saturating 93% on NRB mask.  (01 Jan 2025 18:47)    infectious Disease consult requested today to help with antibiotic management for pneumonia.    Afebrile  + leukocytosis of 17k ( was given solumedrol)  on NC  RVP- negative  + UA - + nitrates    1/3: no fever, NC, wheezing on exam, asked for respiratory treatment, tachycardic, leukocytosis is better 14.95, Cr ok, BCs NGTD, no DVT detected, covid/influenza/RSV negative, Mycoplasma and Legionella urine ag are pending, Ceftriaxone IV and Azithromycin are continued.   1/6: afebrile since 1/4, NC, no wheezes on today's exam, leukocytosis with slight increase 12.0, RVP negative, MRSA PCR not detected, BCs NGTD, Legionella urine ag and Mycoplasma negative, will stop Azithromycin, Ceftriaxone IV continued (day #5). Sputum culture is pending.     Impression-  respiratory distress possibly copd exacerbation vs pneumonia  + UA    Plan-  continue Ceftriaxone (day #5)  stop Azithromycin (day #5) - Legionella urine ag negative  awaiting for sputum culture   follow all culture data   monitor O2 sat and wbc and temp closely  pulmonology input is appreciated     discussed with Dr. Araujo

## 2025-01-06 NOTE — PROGRESS NOTE ADULT - SUBJECTIVE AND OBJECTIVE BOX
ROSY SPARROW  MRN-[id number]  84y (1940)    Follow Up:  Fevers, leukocytosis, PNA    Interval History: The pt was seen and examined earlier, not in acute distress, no new complaints, feeling a little better. Pt is afebrile since 1/4/25, NC, on bipap at night, leukocytosis increased 12.0.    PAST MEDICAL & SURGICAL HISTORY:  HTN (hypertension)      Breast cancer      Dementia      Recurrent urinary tract infection      Mood disorder      H/O left mastectomy          ROS:    [ ] Unobtainable because:  [x ] All other systems negative    Constitutional: no fever, no chills  Head: no trauma  Eyes: no vision changes, no eye pain  ENT:  no sore throat, no rhinorrhea  Cardiovascular:  no chest pain, no palpitation  Respiratory:  no SOB with supplemental O2, + cough  GI:  no abd pain, no vomiting, no diarrhea  urinary: no dysuria, no hematuria, no flank pain  musculoskeletal:  no joint pain, no joint swelling  skin:  no rash  neurology:  no headache, no seizure, no change in mental status  psych: no anxiety, no depression         Allergies  No Known Allergies        ANTIMICROBIALS:  cefTRIAXone   IVPB 1000 every 24 hours      OTHER MEDS:  albuterol/ipratropium for Nebulization 3 milliLiter(s) Nebulizer every 6 hours PRN  dextrose 5%. 1000 milliLiter(s) IV Continuous <Continuous>  enoxaparin Injectable 40 milliGRAM(s) SubCutaneous every 24 hours  furosemide   Injectable 20 milliGRAM(s) IV Push once  influenza  Vaccine (HIGH DOSE) 0.5 milliLiter(s) IntraMuscular once  metoprolol tartrate 25 milliGRAM(s) Oral two times a day      Vital Signs Last 24 Hrs  T(C): 37.1 (06 Jan 2025 11:11), Max: 37.2 (05 Jan 2025 16:21)  T(F): 98.8 (06 Jan 2025 11:11), Max: 99 (05 Jan 2025 16:21)  HR: 98 (06 Jan 2025 11:45) (75 - 99)  BP: 137/74 (06 Jan 2025 11:11) (131/72 - 152/84)  BP(mean): --  RR: 18 (06 Jan 2025 11:11) (18 - 25)  SpO2: 95% (06 Jan 2025 11:45) (90% - 98%)    Parameters below as of 06 Jan 2025 11:45  Patient On (Oxygen Delivery Method): nasal cannula        Physical Exam:  General:    NAD, non toxic, NC, chronically ill appearing - better on today's exam  Head: atraumatic, normocephalic  Eyes: normal sclera and conjunctiva  ENT:   no oropharyngeal lesions, no LAD, neck supple  Cardio:  regular S1,S2  Respiratory:   no wheezes on today's exam, diminished b/l  abd:   soft, BS +, not tender, no distention  :     Mei removed today, no CVAT, no suprapubic tenderness  Musculoskeletal : no joint swelling, no edema  Skin:    no rash, b/l LEs skin discoloration present, venous stasis, normal temperature to touch, not tender   Neurologic:   awake, alert, can answer simple questions and follow simple commands, slow to respond   psych: normal affect      WBC Count: 12.00 K/uL (01-06 @ 05:40)  WBC Count: 11.27 K/uL (01-04 @ 05:50)  WBC Count: 14.95 K/uL (01-03 @ 05:42)  WBC Count: 17.76 K/uL (01-02 @ 07:02)  WBC Count: 12.65 K/uL (01-01 @ 15:40)                            14.0   12.00 )-----------( 209      ( 06 Jan 2025 05:40 )             47.0       01-06    141  |  103  |  16  ----------------------------<  106[H]  4.3   |  37[H]  |  0.36[L]    Ca    8.6      06 Jan 2025 05:40  Phos  2.4     01-06  Mg     2.1     01-06        Urinalysis Basic - ( 06 Jan 2025 05:40 )    Color: x / Appearance: x / SG: x / pH: x  Gluc: 106 mg/dL / Ketone: x  / Bili: x / Urobili: x   Blood: x / Protein: x / Nitrite: x   Leuk Esterase: x / RBC: x / WBC x   Sq Epi: x / Non Sq Epi: x / Bacteria: x        Creatinine Trend: 0.36<--, 0.39<--, 0.44<--, 0.52<--, 0.48<--, 0.52<--      MICROBIOLOGY:  v  .Blood BLOOD  01-01-25   No growth at 4 days  --  --      .Blood BLOOD  01-01-25   No growth at 4 days  --  --          Rapid RVP Result: NotDetec (01-04 @ 16:00)      SARS-CoV-2: NotDetec (01-04-25 @ 16:00)  Rapid RVP Result: NotDetec (01-04-25 @ 16:00)    SARS-CoV-2: NotDetec (04 Jan 2025 16:00)    RADIOLOGY:     ROSY SPARROW  MRN-[id number]  84y (1940)    Follow Up:  Fevers, leukocytosis, PNA    Interval History: The pt was seen and examined earlier, not in acute distress, no new complaints, feeling a little better. Pt is afebrile since 1/4/25, NC, on bipap at night, leukocytosis increased 12.0.    PAST MEDICAL & SURGICAL HISTORY:  HTN (hypertension)      Breast cancer      Dementia      Recurrent urinary tract infection      Mood disorder      H/O left mastectomy          ROS:    [ ] Unobtainable because:  [x ] All other systems negative    Constitutional: no fever, no chills  Head: no trauma  Eyes: no vision changes, no eye pain  ENT:  no sore throat, no rhinorrhea  Cardiovascular:  no chest pain, no palpitation  Respiratory:  no SOB with supplemental O2, + cough  GI:  no abd pain, no vomiting, no diarrhea  urinary: no dysuria, no hematuria, no flank pain  musculoskeletal:  no joint pain, no joint swelling  skin:  no rash  neurology:  no headache, no seizure, no change in mental status  psych: no anxiety, no depression         Allergies  No Known Allergies        ANTIMICROBIALS:  cefTRIAXone   IVPB 1000 every 24 hours      OTHER MEDS:  albuterol/ipratropium for Nebulization 3 milliLiter(s) Nebulizer every 6 hours PRN  dextrose 5%. 1000 milliLiter(s) IV Continuous <Continuous>  enoxaparin Injectable 40 milliGRAM(s) SubCutaneous every 24 hours  furosemide   Injectable 20 milliGRAM(s) IV Push once  influenza  Vaccine (HIGH DOSE) 0.5 milliLiter(s) IntraMuscular once  metoprolol tartrate 25 milliGRAM(s) Oral two times a day      Vital Signs Last 24 Hrs  T(C): 37.1 (06 Jan 2025 11:11), Max: 37.2 (05 Jan 2025 16:21)  T(F): 98.8 (06 Jan 2025 11:11), Max: 99 (05 Jan 2025 16:21)  HR: 98 (06 Jan 2025 11:45) (75 - 99)  BP: 137/74 (06 Jan 2025 11:11) (131/72 - 152/84)  BP(mean): --  RR: 18 (06 Jan 2025 11:11) (18 - 25)  SpO2: 95% (06 Jan 2025 11:45) (90% - 98%)    Parameters below as of 06 Jan 2025 11:45  Patient On (Oxygen Delivery Method): nasal cannula        Physical Exam:  General:    NAD, non toxic, NC, chronically ill appearing - better on today's exam  Head: atraumatic, normocephalic  Eyes: normal sclera and conjunctiva  ENT:   no oropharyngeal lesions, no LAD, neck supple  Cardio:  regular S1,S2  Respiratory:   no wheezes on today's exam  abd:   soft, BS +, not tender, no distention  :     Mei removed today, no CVAT, no suprapubic tenderness  Musculoskeletal : no joint swelling, no edema  Skin:    no rash, b/l LEs skin discoloration present, venous stasis, normal temperature to touch, not tender   Neurologic:   awake, alert, can answer simple questions and follow simple commands, slow to respond   psych: normal affect      WBC Count: 12.00 K/uL (01-06 @ 05:40)  WBC Count: 11.27 K/uL (01-04 @ 05:50)  WBC Count: 14.95 K/uL (01-03 @ 05:42)  WBC Count: 17.76 K/uL (01-02 @ 07:02)  WBC Count: 12.65 K/uL (01-01 @ 15:40)                            14.0   12.00 )-----------( 209      ( 06 Jan 2025 05:40 )             47.0       01-06    141  |  103  |  16  ----------------------------<  106[H]  4.3   |  37[H]  |  0.36[L]    Ca    8.6      06 Jan 2025 05:40  Phos  2.4     01-06  Mg     2.1     01-06        Urinalysis Basic - ( 06 Jan 2025 05:40 )    Color: x / Appearance: x / SG: x / pH: x  Gluc: 106 mg/dL / Ketone: x  / Bili: x / Urobili: x   Blood: x / Protein: x / Nitrite: x   Leuk Esterase: x / RBC: x / WBC x   Sq Epi: x / Non Sq Epi: x / Bacteria: x        Creatinine Trend: 0.36<--, 0.39<--, 0.44<--, 0.52<--, 0.48<--, 0.52<--      MICROBIOLOGY:    .Blood BLOOD  01-01-25   No growth at 4 days  --  --      .Blood BLOOD  01-01-25   No growth at 4 days  --  --          Rapid RVP Result: NotDetec (01-04 @ 16:00)      SARS-CoV-2: NotDetec (01-04-25 @ 16:00)  Rapid RVP Result: NotDetec (01-04-25 @ 16:00)    SARS-CoV-2: NotDetec (04 Jan 2025 16:00)    RADIOLOGY:

## 2025-01-06 NOTE — CHART NOTE - NSCHARTNOTEFT_GEN_A_CORE
Medicine PA Note    Notified by Rn that patient had 24 beats of VT nonsustained. Reviewed monitor telemetry strips .  Vital signs at time of incident 136/78, 88, 25 , 91% Patient denies Cp or palp. Patient currently on Bipap,  mask adjusted on face and increase FI02 from 30-35 %. BMP, magnesium and Phosphorus ordered. K 3.5 , Phos 2.7 , Mag 2.2 . Kcl 10 meq x 3 given . Discussed case with Dr Snider.     Attestation statements:  Time-based billing (Non-critical care).    15 minutes spent on total encounter. The necessity of the time spent during the encounter on this date of service was due to:  Ordering, reviewing, and interpreting labs, testing, and imaging.  -Independently obtaining a review of systems and performing physical    Jessi Located within [facility]    -

## 2025-01-06 NOTE — PROGRESS NOTE ADULT - SUBJECTIVE AND OBJECTIVE BOX
INTERVAL HPI/OVERNIGHT EVENTS: pt still remains on bipap    SUBJECTIVE: Patient seen and examined at bedside.     ROS:    VITAL SIGNS:  ICU Vital Signs Last 24 Hrs  T(C): 36.3 (06 Jan 2025 04:22), Max: 37.2 (05 Jan 2025 10:50)  T(F): 97.3 (06 Jan 2025 04:22), Max: 99 (05 Jan 2025 10:50)  HR: 82 (06 Jan 2025 05:33) (82 - 99)  BP: 148/76 (06 Jan 2025 04:22) (131/72 - 166/73)  BP(mean): --  ABP: --  ABP(mean): --  RR: 18 (06 Jan 2025 04:22) (18 - 25)  SpO2: 93% (06 Jan 2025 05:33) (90% - 98%)    O2 Parameters below as of 06 Jan 2025 05:32  Patient On (Oxygen Delivery Method): nasal cannula          01-05 @ 07:01  -  01-06 @ 07:00  --------------------------------------------------------  IN: 500 mL / OUT: 1650 mL / NET: -1150 mL      CAPILLARY BLOOD GLUCOSE          ECG: reviewed.    PHYSICAL EXAM:    General: Awake, alert NAD  HEENT: NC/AT, PERRL, MMM  Neck: supple, no JVD  Respiratory: Mild wheeze B/L, no rhonchi rales  Cardiovascular: Regular rhythm, tachycardic  Abdomen: soft, NTTP, ND, +BS  Extremities:  no c/c/e. B/L LE discoloration chronic stasis appearing  Skin: no rashes  Neurological: A&Ox1, confused. non focal    MEDICATIONS:  MEDICATIONS  (STANDING):  albuterol/ipratropium for Nebulization 3 milliLiter(s) Nebulizer every 6 hours  azithromycin   Tablet 500 milliGRAM(s) Oral every 24 hours  cefTRIAXone   IVPB 1000 milliGRAM(s) IV Intermittent every 24 hours  dextrose 5%. 1000 milliLiter(s) (75 mL/Hr) IV Continuous <Continuous>  enoxaparin Injectable 40 milliGRAM(s) SubCutaneous every 24 hours  influenza  Vaccine (HIGH DOSE) 0.5 milliLiter(s) IntraMuscular once    MEDICATIONS  (PRN):      ALLERGIES:  Allergies    No Known Allergies    Intolerances        LABS:                        14.0   12.00 )-----------( 209      ( 06 Jan 2025 05:40 )             47.0     01-06    141  |  103  |  16  ----------------------------<  106[H]  4.3   |  37[H]  |  0.36[L]    Ca    8.6      06 Jan 2025 05:40  Phos  2.4     01-06  Mg     2.1     01-06        Urinalysis Basic - ( 06 Jan 2025 05:40 )    Color: x / Appearance: x / SG: x / pH: x  Gluc: 106 mg/dL / Ketone: x  / Bili: x / Urobili: x   Blood: x / Protein: x / Nitrite: x   Leuk Esterase: x / RBC: x / WBC x   Sq Epi: x / Non Sq Epi: x / Bacteria: x      ABG:      vBG:    Micro:    Culture - Blood (collected 01-01-25 @ 15:40)  Source: .Blood BLOOD  Preliminary Report (01-06-25 @ 05:00):    No growth at 4 days    Culture - Blood (collected 01-01-25 @ 15:30)  Source: .Blood BLOOD  Preliminary Report (01-06-25 @ 05:00):    No growth at 4 days       Urinalysis with Rflx Culture (collected 01-01-25 @ 16:00)         RADIOLOGY & ADDITIONAL TESTS: Reviewed.   INTERVAL HPI/OVERNIGHT EVENTS: wore bipap overnight     SUBJECTIVE: Patient seen and examined at bedside. Pt endorses improvement in SOB     ROS: all neg except as listed above     VITAL SIGNS:  ICU Vital Signs Last 24 Hrs  T(C): 36.3 (06 Jan 2025 04:22), Max: 37.2 (05 Jan 2025 10:50)  T(F): 97.3 (06 Jan 2025 04:22), Max: 99 (05 Jan 2025 10:50)  HR: 82 (06 Jan 2025 05:33) (82 - 99)  BP: 148/76 (06 Jan 2025 04:22) (131/72 - 166/73)  BP(mean): --  ABP: --  ABP(mean): --  RR: 18 (06 Jan 2025 04:22) (18 - 25)  SpO2: 93% (06 Jan 2025 05:33) (90% - 98%)    O2 Parameters below as of 06 Jan 2025 05:32  Patient On (Oxygen Delivery Method): nasal cannula          01-05 @ 07:01  -  01-06 @ 07:00  --------------------------------------------------------  IN: 500 mL / OUT: 1650 mL / NET: -1150 mL      CAPILLARY BLOOD GLUCOSE          ECG: reviewed.    PHYSICAL EXAM:    General: Awake, alert NAD  HEENT: NC/AT, PERRL, MMM  Neck: supple, no JVD  Respiratory: ctabl  Cardiovascular: Regular rhythm, tachycardic  Abdomen: soft, NTTP, ND, +BS  Extremities:  no c/c/e. B/L LE discoloration chronic stasis appearing  Skin: no rashes  Neurological: A&Ox1, confused. non focal    MEDICATIONS:  MEDICATIONS  (STANDING):  albuterol/ipratropium for Nebulization 3 milliLiter(s) Nebulizer every 6 hours  azithromycin   Tablet 500 milliGRAM(s) Oral every 24 hours  cefTRIAXone   IVPB 1000 milliGRAM(s) IV Intermittent every 24 hours  dextrose 5%. 1000 milliLiter(s) (75 mL/Hr) IV Continuous <Continuous>  enoxaparin Injectable 40 milliGRAM(s) SubCutaneous every 24 hours  influenza  Vaccine (HIGH DOSE) 0.5 milliLiter(s) IntraMuscular once    MEDICATIONS  (PRN):      ALLERGIES:  Allergies    No Known Allergies    Intolerances        LABS:                        14.0   12.00 )-----------( 209      ( 06 Jan 2025 05:40 )             47.0     01-06    141  |  103  |  16  ----------------------------<  106[H]  4.3   |  37[H]  |  0.36[L]    Ca    8.6      06 Jan 2025 05:40  Phos  2.4     01-06  Mg     2.1     01-06        Urinalysis Basic - ( 06 Jan 2025 05:40 )    Color: x / Appearance: x / SG: x / pH: x  Gluc: 106 mg/dL / Ketone: x  / Bili: x / Urobili: x   Blood: x / Protein: x / Nitrite: x   Leuk Esterase: x / RBC: x / WBC x   Sq Epi: x / Non Sq Epi: x / Bacteria: x      ABG:      vBG:    Micro:    Culture - Blood (collected 01-01-25 @ 15:40)  Source: .Blood BLOOD  Preliminary Report (01-06-25 @ 05:00):    No growth at 4 days    Culture - Blood (collected 01-01-25 @ 15:30)  Source: .Blood BLOOD  Preliminary Report (01-06-25 @ 05:00):    No growth at 4 days       Urinalysis with Rflx Culture (collected 01-01-25 @ 16:00)         RADIOLOGY & ADDITIONAL TESTS: Reviewed.   INTERVAL HPI/OVERNIGHT EVENTS: wore bipap overnight     SUBJECTIVE: Patient seen and examined at bedside. Pt endorses improvement in SOB     ROS: all neg except as listed above     VITAL SIGNS:  Vital Signs Last 24 Hrs  T(C): 36.3 (06 Jan 2025 04:22), Max: 37.2 (05 Jan 2025 10:50)  T(F): 97.3 (06 Jan 2025 04:22), Max: 99 (05 Jan 2025 10:50)  HR: 82 (06 Jan 2025 05:33) (82 - 99)  BP: 148/76 (06 Jan 2025 04:22) (131/72 - 166/73)  RR: 18 (06 Jan 2025 04:22) (18 - 25)  SpO2: 93% (06 Jan 2025 05:33) (90% - 98%)    O2 Parameters below as of 06 Jan 2025 05:32  Patient On (Oxygen Delivery Method): nasal cannula          01-05 @ 07:01  -  01-06 @ 07:00  --------------------------------------------------------  IN: 500 mL / OUT: 1650 mL / NET: -1150 mL        PHYSICAL EXAM:  General: Awake, alert NAD  HEENT: NC/AT, PERRL, MMM  Neck: supple, no JVD  Respiratory: ctabl, no wheeze, no rhonchi  Cardiovascular: Regular rhythm, tachycardic  Abdomen: soft, NTTP, ND, +BS  Extremities:  no c/c/e. B/L LE discoloration chronic stasis appearing  Neurological: A&Ox1, confused. non focal    MEDICATIONS:  MEDICATIONS  (STANDING):  albuterol/ipratropium for Nebulization 3 milliLiter(s) Nebulizer every 6 hours  azithromycin   Tablet 500 milliGRAM(s) Oral every 24 hours  cefTRIAXone   IVPB 1000 milliGRAM(s) IV Intermittent every 24 hours  dextrose 5%. 1000 milliLiter(s) (75 mL/Hr) IV Continuous <Continuous>  enoxaparin Injectable 40 milliGRAM(s) SubCutaneous every 24 hours  influenza  Vaccine (HIGH DOSE) 0.5 milliLiter(s) IntraMuscular once        Allergies  No Known Allergies            LABS:                        14.0   12.00 )-----------( 209      ( 06 Jan 2025 05:40 )             47.0     01-06    141  |  103  |  16  ----------------------------<  106[H]  4.3   |  37[H]  |  0.36[L]    Ca    8.6      06 Jan 2025 05:40  Phos  2.4     01-06  Mg     2.1     01-06        Micro:    Culture - Blood (collected 01-01-25 @ 15:40)  Source: .Blood BLOOD  Preliminary Report (01-06-25 @ 05:00):    No growth at 4 days    Culture - Blood (collected 01-01-25 @ 15:30)  Source: .Blood BLOOD  Preliminary Report (01-06-25 @ 05:00):    No growth at 4 days                RADIOLOGY & ADDITIONAL TESTS: Reviewed.  < from: Xray Chest 1 View- PORTABLE-Urgent (01.01.25 @ 15:58) >  Mild CHF. Cardiomegaly. Can't exclude underlying pneumonia.   Consider chest CT.    ------------------------  < from: TTE Echo Complete w/o Contrast w/ Doppler (01.03.25 @ 20:43) >  CONCLUSIONS:     1. Left ventricular cavity is normal in size. Left ventricular systolic function is normal with an ejection fraction visually estimated at 60 to 65 %.   2. The left ventricular diastolic function is indeterminate.   3. Normal right ventricular cavity size.   4. Left atrium is normal in size.   5. The right atrium is normal in size.   6. Mild mitral regurgitation.   7. Mild tricuspid regurgitation.   8. There is calcification of the mitral valve annulus.

## 2025-01-06 NOTE — PROGRESS NOTE ADULT - SUBJECTIVE AND OBJECTIVE BOX
Patient is a 84y old  Female who presents with a chief complaint of Sepsis, acute respiratory failure with hypoxia and hypercapnia (06 Jan 2025 16:01)      INTERVAL HPI/OVERNIGHT EVENTS:  Pt was seen and examined, no acute events.      MEDICATIONS  (STANDING):  cefTRIAXone   IVPB 1000 milliGRAM(s) IV Intermittent every 24 hours  dextrose 5%. 1000 milliLiter(s) (75 mL/Hr) IV Continuous <Continuous>  enoxaparin Injectable 40 milliGRAM(s) SubCutaneous every 24 hours  furosemide   Injectable 20 milliGRAM(s) IV Push once  influenza  Vaccine (HIGH DOSE) 0.5 milliLiter(s) IntraMuscular once  metoprolol tartrate 25 milliGRAM(s) Oral two times a day    MEDICATIONS  (PRN):  albuterol/ipratropium for Nebulization 3 milliLiter(s) Nebulizer every 6 hours PRN Shortness of Breath and/or Wheezing      Allergies  No Known Allergies        Vital Signs Last 24 Hrs  T(C): 37.1 (06 Jan 2025 11:11), Max: 37.1 (06 Jan 2025 11:11)  T(F): 98.8 (06 Jan 2025 11:11), Max: 98.8 (06 Jan 2025 11:11)  HR: 98 (06 Jan 2025 11:45) (75 - 99)  BP: 137/74 (06 Jan 2025 11:11) (131/72 - 148/76)  BP(mean): --  RR: 18 (06 Jan 2025 11:11) (18 - 25)  SpO2: 95% (06 Jan 2025 11:45) (90% - 98%)    Parameters below as of 06 Jan 2025 11:45  Patient On (Oxygen Delivery Method): nasal cannula        PHYSICAL EXAM:  GENERAL: NAD  NERVOUS SYSTEM:  Awake, Alert, non focal  CHEST/LUNG: +wheezing, on NC  HEART: RRR, S1, S2  ABDOMEN: Soft, Nontender, Nondistended; Bowel sounds present  EXTREMITIES:  2+ Peripheral Pulses, No clubbing, cyanosis, or edema      LABS:                        14.0   12.00 )-----------( 209      ( 06 Jan 2025 05:40 )             47.0     01-06    141  |  103  |  16  ----------------------------<  106[H]  4.3   |  37[H]  |  0.36[L]    Ca    8.6      06 Jan 2025 05:40  Phos  2.4     01-06  Mg     2.1     01-06        Urinalysis Basic - ( 06 Jan 2025 05:40 )    Color: x / Appearance: x / SG: x / pH: x  Gluc: 106 mg/dL / Ketone: x  / Bili: x / Urobili: x   Blood: x / Protein: x / Nitrite: x   Leuk Esterase: x / RBC: x / WBC x   Sq Epi: x / Non Sq Epi: x / Bacteria: x      CAPILLARY BLOOD GLUCOSE          RADIOLOGY & ADDITIONAL TESTS:    Imaging Personally Reviewed:  [ ] YES  [ ] NO    Consultant(s) Notes Reviewed:  [ ] YES  [ ] NO    Care Discussed with Consultants/Other Providers [ ] YES  [ ] NO

## 2025-01-07 LAB
ANION GAP SERPL CALC-SCNC: 3 MMOL/L — LOW (ref 5–17)
BUN SERPL-MCNC: 13 MG/DL — SIGNIFICANT CHANGE UP (ref 7–23)
CALCIUM SERPL-MCNC: 8.8 MG/DL — SIGNIFICANT CHANGE UP (ref 8.5–10.1)
CHLORIDE SERPL-SCNC: 98 MMOL/L — SIGNIFICANT CHANGE UP (ref 96–108)
CO2 SERPL-SCNC: 39 MMOL/L — HIGH (ref 22–31)
CREAT SERPL-MCNC: 0.33 MG/DL — LOW (ref 0.5–1.3)
CULTURE RESULTS: ABNORMAL
CULTURE RESULTS: SIGNIFICANT CHANGE UP
CULTURE RESULTS: SIGNIFICANT CHANGE UP
EGFR: 101 ML/MIN/1.73M2 — SIGNIFICANT CHANGE UP
GLUCOSE SERPL-MCNC: 109 MG/DL — HIGH (ref 70–99)
GRAM STN FLD: ABNORMAL
HCT VFR BLD CALC: 46.6 % — HIGH (ref 34.5–45)
HGB BLD-MCNC: 14.1 G/DL — SIGNIFICANT CHANGE UP (ref 11.5–15.5)
MCHC RBC-ENTMCNC: 28.8 PG — SIGNIFICANT CHANGE UP (ref 27–34)
MCHC RBC-ENTMCNC: 30.3 G/DL — LOW (ref 32–36)
MCV RBC AUTO: 95.3 FL — SIGNIFICANT CHANGE UP (ref 80–100)
NRBC # BLD: 0 /100 WBCS — SIGNIFICANT CHANGE UP (ref 0–0)
PLATELET # BLD AUTO: 208 K/UL — SIGNIFICANT CHANGE UP (ref 150–400)
POTASSIUM SERPL-MCNC: 3.5 MMOL/L — SIGNIFICANT CHANGE UP (ref 3.5–5.3)
POTASSIUM SERPL-SCNC: 3.5 MMOL/L — SIGNIFICANT CHANGE UP (ref 3.5–5.3)
RBC # BLD: 4.89 M/UL — SIGNIFICANT CHANGE UP (ref 3.8–5.2)
RBC # FLD: 14 % — SIGNIFICANT CHANGE UP (ref 10.3–14.5)
SODIUM SERPL-SCNC: 140 MMOL/L — SIGNIFICANT CHANGE UP (ref 135–145)
SPECIMEN SOURCE: SIGNIFICANT CHANGE UP
WBC # BLD: 10.68 K/UL — HIGH (ref 3.8–10.5)
WBC # FLD AUTO: 10.68 K/UL — HIGH (ref 3.8–10.5)

## 2025-01-07 PROCEDURE — 99233 SBSQ HOSP IP/OBS HIGH 50: CPT

## 2025-01-07 PROCEDURE — 99232 SBSQ HOSP IP/OBS MODERATE 35: CPT

## 2025-01-07 RX ADMIN — Medication 25 MILLIGRAM(S): at 05:57

## 2025-01-07 RX ADMIN — Medication 25 MILLIGRAM(S): at 17:16

## 2025-01-07 RX ADMIN — ENOXAPARIN SODIUM 40 MILLIGRAM(S): 60 INJECTION INTRAVENOUS; SUBCUTANEOUS at 05:57

## 2025-01-07 RX ADMIN — SODIUM CHLORIDE 75 MILLILITER(S): 9 INJECTION, SOLUTION INTRAVENOUS at 13:00

## 2025-01-07 NOTE — PROGRESS NOTE ADULT - PROBLEM SELECTOR PLAN 2
- Acute hypoxemic/ hypercapnic respiratory failure likely in setting of PNA  - Possible OHS/JOSE R. Would continue with Nocturnal NIV  - Completed abx for PNA  - Legionella negative, Flu/ Covid/ RVP negative, legionella neg  - No recorded history of COPD  - TTE with preserved EF, indeterminate diastolic function  - Remains on NC and BIPAP at night , continue to taper down NC, prepare for dc back to NH - Acute hypoxemic/ hypercapnic respiratory failure likely in setting of PNA  - Possible OHS/JOSE R. Would continue with Nocturnal NIV  - Completed abx for PNA  - Legionella negative, Flu/ Covid/ RVP negative, legionella neg  - No recorded history of COPD  - TTE with preserved EF, indeterminate diastolic function  - Remains on NC and BIPAP at night , continue to taper down NC, prepare for dc back to NH    One episode of NSVT in tele while ot was hypoxic off NC, EF preserved, added low dose BB

## 2025-01-07 NOTE — PROGRESS NOTE ADULT - SUBJECTIVE AND OBJECTIVE BOX
Interval Events: NAEO. Wore bipap.    REVIEW OF SYSTEMS:  Constitutional: [ ] negative [ ] fevers [ ] chills [ ] weight loss [ ] weight gain  HEENT: [ ] negative [ ] dry eyes [ ] eye irritation [ ] postnasal drip [ ] nasal congestion  CV: [ ] negative  [ ] chest pain [ ] orthopnea [ ] palpitations [ ] murmur  Resp: [ ] negative [ ] cough [ ] shortness of breath [ ] dyspnea [ ] wheezing [ ] sputum [ ] hemoptysis  GI: [ ] negative [ ] nausea [ ] vomiting [ ] diarrhea [ ] constipation [ ] abd pain [ ] dysphagia   : [ ] negative [ ] dysuria [ ] nocturia [ ] hematuria [ ] increased urinary frequency  Musculoskeletal: [ ] negative [ ] back pain [ ] myalgias [ ] arthralgias [ ] fracture  Skin: [ ] negative [ ] rash [ ] itch  Neurological: [ ] negative [ ] headache [ ] dizziness [ ] syncope [ ] weakness [ ] numbness  Psychiatric: [ ] negative [ ] anxiety [ ] depression  Endocrine: [ ] negative [ ] diabetes [ ] thyroid problem  Hematologic/Lymphatic: [ ] negative [ ] anemia [ ] bleeding problem  Allergic/Immunologic: [ ] negative [ ] itchy eyes [ ] nasal discharge [ ] hives [ ] angioedema  [X ] All other systems negative  [ ] Unable to assess ROS because ________    OBJECTIVE:  ICU Vital Signs Last 24 Hrs  T(C): 36.6 (07 Jan 2025 04:25), Max: 37.1 (06 Jan 2025 11:11)  T(F): 97.9 (07 Jan 2025 04:25), Max: 98.8 (06 Jan 2025 11:11)  HR: 79 (07 Jan 2025 06:43) (75 - 99)  BP: 146/81 (07 Jan 2025 04:25) (118/64 - 146/81)  BP(mean): --  ABP: --  ABP(mean): --  RR: 18 (07 Jan 2025 04:25) (18 - 19)  SpO2: 94% (07 Jan 2025 06:43) (91% - 95%)    O2 Parameters below as of 07 Jan 2025 04:25  Patient On (Oxygen Delivery Method): BiPAP/CPAP              01-06 @ 07:01  -  01-07 @ 07:00  --------------------------------------------------------  IN: 0 mL / OUT: 1100 mL / NET: -1100 mL      CAPILLARY BLOOD GLUCOSE          PHYSICAL EXAM:  General: Awake, alert NAD  HEENT: NC/AT, PERRL, MMM  Neck: supple, no JVD  Respiratory: ctabl  Cardiovascular: Regular rhythm, tachycardic  Abdomen: soft, NTTP, ND, +BS  Extremities:  no c/c/e. B/L LE discoloration chronic stasis appearing  Skin: no rashes  Neurological: A&Ox1, confused. non focal    HOSPITAL MEDICATIONS:  enoxaparin Injectable 40 milliGRAM(s) SubCutaneous every 24 hours      metoprolol tartrate 25 milliGRAM(s) Oral two times a day      albuterol/ipratropium for Nebulization 3 milliLiter(s) Nebulizer every 6 hours PRN            dextrose 5%. 1000 milliLiter(s) IV Continuous <Continuous>    influenza  Vaccine (HIGH DOSE) 0.5 milliLiter(s) IntraMuscular once          LABS:                        14.1   10.68 )-----------( 208      ( 07 Jan 2025 06:45 )             46.6     Hgb Trend: 14.1<--, 14.0<--, 13.7<--, 13.4<--, 14.0<--  01-06    141  |  103  |  16  ----------------------------<  106[H]  4.3   |  37[H]  |  0.36[L]    Ca    8.6      06 Jan 2025 05:40  Phos  2.4     01-06  Mg     2.1     01-06      Creatinine Trend: 0.36<--, 0.39<--, 0.44<--, 0.52<--, 0.48<--, 0.52<--    Urinalysis Basic - ( 06 Jan 2025 05:40 )    Color: x / Appearance: x / SG: x / pH: x  Gluc: 106 mg/dL / Ketone: x  / Bili: x / Urobili: x   Blood: x / Protein: x / Nitrite: x   Leuk Esterase: x / RBC: x / WBC x   Sq Epi: x / Non Sq Epi: x / Bacteria: x            MICROBIOLOGY:     RADIOLOGY:  [ ] Reviewed and interpreted by me    PULMONARY FUNCTION TESTS:    EKG:     Interval Events: NAEO.  patient appears comfortable and awake/talking but when spo2 checked was 70% on RA. Placed patient back on  NC with recovery of Spo2.     REVIEW OF SYSTEMS: all negative unless above.     OBJECTIVE:  ICU Vital Signs Last 24 Hrs  T(C): 36.6 (07 Jan 2025 04:25), Max: 37.1 (06 Jan 2025 11:11)  T(F): 97.9 (07 Jan 2025 04:25), Max: 98.8 (06 Jan 2025 11:11)  HR: 79 (07 Jan 2025 06:43) (75 - 99)  BP: 146/81 (07 Jan 2025 04:25) (118/64 - 146/81)  BP(mean): --  ABP: --  ABP(mean): --  RR: 18 (07 Jan 2025 04:25) (18 - 19)  SpO2: 94% (07 Jan 2025 06:43) (91% - 95%)    O2 Parameters below as of 07 Jan 2025 04:25  Patient On (Oxygen Delivery Method): BiPAP/CPAP              01-06 @ 07:01  -  01-07 @ 07:00  --------------------------------------------------------  IN: 0 mL / OUT: 1100 mL / NET: -1100 mL      CAPILLARY BLOOD GLUCOSE          PHYSICAL EXAM:  General: Awake, alert NAD  HEENT: NC/AT, PERRL, MMM  Neck: supple, no JVD  Respiratory: ctabl  Cardiovascular: Regular rhythm, tachycardic  Abdomen: soft, NTTP, ND, +BS  Extremities:  no c/c/e. B/L LE discoloration chronic stasis appearing  Skin: no rashes  Neurological: A&Ox1, confused. non focal    HOSPITAL MEDICATIONS:  enoxaparin Injectable 40 milliGRAM(s) SubCutaneous every 24 hours      metoprolol tartrate 25 milliGRAM(s) Oral two times a day      albuterol/ipratropium for Nebulization 3 milliLiter(s) Nebulizer every 6 hours PRN            dextrose 5%. 1000 milliLiter(s) IV Continuous <Continuous>    influenza  Vaccine (HIGH DOSE) 0.5 milliLiter(s) IntraMuscular once          LABS:                        14.1   10.68 )-----------( 208      ( 07 Jan 2025 06:45 )             46.6     Hgb Trend: 14.1<--, 14.0<--, 13.7<--, 13.4<--, 14.0<--  01-06    141  |  103  |  16  ----------------------------<  106[H]  4.3   |  37[H]  |  0.36[L]    Ca    8.6      06 Jan 2025 05:40  Phos  2.4     01-06  Mg     2.1     01-06      Creatinine Trend: 0.36<--, 0.39<--, 0.44<--, 0.52<--, 0.48<--, 0.52<--    Urinalysis Basic - ( 06 Jan 2025 05:40 )    Color: x / Appearance: x / SG: x / pH: x  Gluc: 106 mg/dL / Ketone: x  / Bili: x / Urobili: x   Blood: x / Protein: x / Nitrite: x   Leuk Esterase: x / RBC: x / WBC x   Sq Epi: x / Non Sq Epi: x / Bacteria: x            MICROBIOLOGY:     RADIOLOGY:  [ ] Reviewed and interpreted by me    PULMONARY FUNCTION TESTS:    EKG:     Interval Events: NAEO.  patient appears comfortable and awake/talking but when spo2 checked was 70% on RA. Placed patient back on  NC with recovery of Spo2.     REVIEW OF SYSTEMS: all negative unless above.     OBJECTIVE:  Vital Signs Last 24 Hrs  T(C): 36.6 (07 Jan 2025 04:25), Max: 37.1 (06 Jan 2025 11:11)  T(F): 97.9 (07 Jan 2025 04:25), Max: 98.8 (06 Jan 2025 11:11)  HR: 79 (07 Jan 2025 06:43) (75 - 99)  BP: 146/81 (07 Jan 2025 04:25) (118/64 - 146/81)  RR: 18 (07 Jan 2025 04:25) (18 - 19)  SpO2: 94% (07 Jan 2025 06:43) (91% - 95%)    O2 Parameters below as of 07 Jan 2025 04:25  Patient On (Oxygen Delivery Method): BiPAP/CPAP              01-06 @ 07:01  -  01-07 @ 07:00  --------------------------------------------------------  IN: 0 mL / OUT: 1100 mL / NET: -1100 mL              PHYSICAL EXAM:  General: Awake, alert NAD  HEENT: NC/AT, PERRL, MMM  Neck: supple, no JVD  Respiratory: ctabl  Cardiovascular: Regular rhythm, tachycardic  Abdomen: soft, NTTP, ND, +BS  Extremities:  no c/c/e. B/L LE discoloration chronic stasis appearing  Skin: no rashes  Neurological: A&Ox1, confused. non focal    HOSPITAL MEDICATIONS:  enoxaparin Injectable 40 milliGRAM(s) SubCutaneous every 24 hours      metoprolol tartrate 25 milliGRAM(s) Oral two times a day      albuterol/ipratropium for Nebulization 3 milliLiter(s) Nebulizer every 6 hours PRN            dextrose 5%. 1000 milliLiter(s) IV Continuous <Continuous>    influenza  Vaccine (HIGH DOSE) 0.5 milliLiter(s) IntraMuscular once          LABS:                        14.1   10.68 )-----------( 208      ( 07 Jan 2025 06:45 )             46.6     Hgb Trend: 14.1<--, 14.0<--, 13.7<--, 13.4<--, 14.0<--  01-06    141  |  103  |  16  ----------------------------<  106[H]  4.3   |  37[H]  |  0.36[L]    Ca    8.6      06 Jan 2025 05:40  Phos  2.4     01-06  Mg     2.1     01-06      Creatinine Trend: 0.36<--, 0.39<--, 0.44<--, 0.52<--, 0.48<--, 0.52<--    Urinalysis Basic - ( 06 Jan 2025 05:40 )    Color: x / Appearance: x / SG: x / pH: x  Gluc: 106 mg/dL / Ketone: x  / Bili: x / Urobili: x   Blood: x / Protein: x / Nitrite: x   Leuk Esterase: x / RBC: x / WBC x   Sq Epi: x / Non Sq Epi: x / Bacteria: x            MICROBIOLOGY:     RADIOLOGY:  [ ] Reviewed and interpreted by me    PULMONARY FUNCTION TESTS:    EKG:     Interval Events: NAEO.  patient appears comfortable and awake/talking but when spo2 checked was 70% on RA. Placed patient back on  NC with recovery of Spo2.     REVIEW OF SYSTEMS: all negative unless above.     OBJECTIVE:  Vital Signs Last 24 Hrs  T(C): 36.6 (07 Jan 2025 04:25), Max: 37.1 (06 Jan 2025 11:11)  T(F): 97.9 (07 Jan 2025 04:25), Max: 98.8 (06 Jan 2025 11:11)  HR: 79 (07 Jan 2025 06:43) (75 - 99)  BP: 146/81 (07 Jan 2025 04:25) (118/64 - 146/81)  RR: 18 (07 Jan 2025 04:25) (18 - 19)  SpO2: 94% (07 Jan 2025 06:43) (91% - 95%)    O2 Parameters below as of 07 Jan 2025 04:25  Patient On (Oxygen Delivery Method): BiPAP/CPAP              01-06 @ 07:01  -  01-07 @ 07:00  --------------------------------------------------------  IN: 0 mL / OUT: 1100 mL / NET: -1100 mL              PHYSICAL EXAM:  General: Awake, alert NAD  HEENT: NC/AT, PERRL, MMM  Neck: supple, no JVD  Respiratory: ctabl  Cardiovascular: Regular rhythm, tachycardic  Abdomen: soft, NTTP, ND, +BS  Extremities:  no c/c/e. B/L LE discoloration chronic stasis appearing  Skin: no rashes  Neurological: A&Ox1, confused. non focal      HOSPITAL MEDICATIONS:  enoxaparin Injectable 40 milliGRAM(s) SubCutaneous every 24 hours      metoprolol tartrate 25 milliGRAM(s) Oral two times a day      albuterol/ipratropium for Nebulization 3 milliLiter(s) Nebulizer every 6 hours PRN      dextrose 5%. 1000 milliLiter(s) IV Continuous <Continuous>    influenza  Vaccine (HIGH DOSE) 0.5 milliLiter(s) IntraMuscular once          LABS:                        14.1   10.68 )-----------( 208      ( 07 Jan 2025 06:45 )             46.6     Hgb Trend: 14.1<--, 14.0<--, 13.7<--, 13.4<--, 14.0<--  01-06    141  |  103  |  16  ----------------------------<  106[H]  4.3   |  37[H]  |  0.36[L]    Ca    8.6      06 Jan 2025 05:40  Phos  2.4     01-06  Mg     2.1     01-06      Creatinine Trend: 0.36<--, 0.39<--, 0.44<--, 0.52<--, 0.48<--, 0.52<--            MICROBIOLOGY:   Respiratory Viral Panel with COVID-19 by VONDA (01.04.25 @ 16:00)    [facility]   SARS-CoV-2: NotDete    Culture - Sputum . (01.06.25 @ 06:00)   Specimen Source: .Sputum Sputum   Culture Results: Commensal marc consistent with body site    Culture - Blood (01.01.25 @ 15:40)    Specimen Source: .Blood BLOOD   Culture Results: No growth at 5 days            RADIOLOGY:  [x] Reviewed and interpreted by me  < from: Xray Chest 1 View- PORTABLE-Urgent (01.01.25 @ 15:58) >  Mild CHF. Cardiomegaly. Can't exclude underlying pneumonia.   Consider chest CT.    -------------------      < from: TTE Echo Complete w/o Contrast w/ Doppler (01.03.25 @ 20:43) >  CONCLUSIONS:     1. Left ventricular cavity is normal in size. Left ventricular systolic   function is normal with an ejection fraction visually estimated at 60 to 65 %.   2. The left ventricular diastolic function is indeterminate.   3. Normal right ventricular cavity size.   4. Left atrium is normal in size.   5. The right atrium is normal in size.   6. Mild mitral regurgitation.   7. Mild tricuspid regurgitation.   8. There is calcification of the mitral valve annulus.

## 2025-01-07 NOTE — PROGRESS NOTE ADULT - SUBJECTIVE AND OBJECTIVE BOX
Patient is a 84y old  Female who presents with a chief complaint of Sepsis, acute respiratory failure with hypoxia and hypercapnia (07 Jan 2025 08:19)      INTERVAL HPI/OVERNIGHT EVENTS:  Pt was seen and examined, no acute events.      MEDICATIONS  (STANDING):  dextrose 5%. 1000 milliLiter(s) (75 mL/Hr) IV Continuous <Continuous>  enoxaparin Injectable 40 milliGRAM(s) SubCutaneous every 24 hours  influenza  Vaccine (HIGH DOSE) 0.5 milliLiter(s) IntraMuscular once  metoprolol tartrate 25 milliGRAM(s) Oral two times a day    MEDICATIONS  (PRN):  albuterol/ipratropium for Nebulization 3 milliLiter(s) Nebulizer every 6 hours PRN Shortness of Breath and/or Wheezing      Allergies  No Known Allergies        Vital Signs Last 24 Hrs  T(C): 36.6 (07 Jan 2025 08:08), Max: 36.9 (06 Jan 2025 23:30)  T(F): 97.8 (07 Jan 2025 08:08), Max: 98.5 (06 Jan 2025 23:30)  HR: 74 (07 Jan 2025 08:08) (74 - 96)  BP: 128/76 (07 Jan 2025 08:08) (118/64 - 146/81)  BP(mean): --  RR: 18 (07 Jan 2025 08:08) (18 - 19)  SpO2: 92% (07 Jan 2025 08:08) (91% - 96%)    Parameters below as of 07 Jan 2025 08:08  Patient On (Oxygen Delivery Method): BiPAP/CPAP        PHYSICAL EXAM:  GENERAL: NAD  NERVOUS SYSTEM:  Awake, Alert, non focal  CHEST/LUNG: +wheezing, on NC  HEART: RRR, S1, S2  ABDOMEN: Soft, Nontender, Nondistended; Bowel sounds present  EXTREMITIES:  2+ Peripheral Pulses, No clubbing, cyanosis, or edema    LABS:                        14.1   10.68 )-----------( 208      ( 07 Jan 2025 06:45 )             46.6     01-07    140  |  98  |  13  ----------------------------<  109[H]  3.5   |  39[H]  |  0.33[L]    Ca    8.8      07 Jan 2025 06:45  Phos  2.4     01-06  Mg     2.1     01-06        Urinalysis Basic - ( 07 Jan 2025 06:45 )    Color: x / Appearance: x / SG: x / pH: x  Gluc: 109 mg/dL / Ketone: x  / Bili: x / Urobili: x   Blood: x / Protein: x / Nitrite: x   Leuk Esterase: x / RBC: x / WBC x   Sq Epi: x / Non Sq Epi: x / Bacteria: x      CAPILLARY BLOOD GLUCOSE      Culture - Sputum (collected 06 Jan 2025 06:00)  Source: .Sputum Sputum  Gram Stain (prelim) (07 Jan 2025 08:31):    Rare polymorphonuclear leukocytes per low power field    Few Squamous epithelial cells per low power field    Few Yeast like cells seen per oil power field  Preliminary Report (07 Jan 2025 08:31):    Commensal marc consistent with body site      RADIOLOGY & ADDITIONAL TESTS:    Imaging Personally Reviewed:  [ ] YES  [ ] NO    Consultant(s) Notes Reviewed:  [ ] YES  [ ] NO    Care Discussed with Consultants/Other Providers [ ] YES  [ ] NO

## 2025-01-07 NOTE — PROGRESS NOTE ADULT - PROBLEM SELECTOR PLAN 4
- UA with 7 WBC   - Has hx of recurrent UTIs  - No urine clx was sent
- UA positive  - Has hx of recurrent UTIs  - F/u UCx
- UA with 7 WBC   - Has hx of recurrent UTIs  - No urine clx was sent
- UA with 7 WBC   - Has hx of recurrent UTIs  - No urine clx was sent
- UA positive  - Has hx of recurrent UTIs  - F/u UCx
- UA with 7 WBC   - Has hx of recurrent UTIs  - No urine clx was sent

## 2025-01-07 NOTE — PROGRESS NOTE ADULT - ASSESSMENT
Patient is a 84F, with PMHx of Dementia, mood disorder, former smoker, recurrent UTIs, breast cancer, who was sent from NH for respiratory distress. Wheezing on arrival, SpO2 was 93% on NRB mask. Hypercapnic initially requiring AVAPS. Admitted to medicine with PNA, UTI. Pulmonary consulted    DX: Acute hypoxemic/ hypercapnic respiratory failure, PNA, UTI    Reccs:  - Acute hypoxemic/ hypercapnic respiratory failure likely in setting of PNA  -satting 92% on 4LNC. wean as able   - likely  OHS/JOSE R. Would continue with Nocturnal NIV and prn  - C/W CAP coverage for now  - Legionella negative, Flu/ Covid/ RVP negative  - F/U Mycoplasma IgM, Full RVP, Sputum culture  - wheezing improved. will make duonebs prn  - TTE with nl LV function  - DNR/DNI: trial of NIV  - Rest of care as per primary team    d/w dr jones Patient is a 84F, with PMHx of Dementia, mood disorder, former smoker, recurrent UTIs, breast cancer, who was sent from NH for respiratory distress. Wheezing on arrival, SpO2 was 93% on NRB mask. Hypercapnic initially requiring AVAPS. Admitted to medicine with PNA, UTI. Pulmonary consulted    DX: Acute hypoxemic/ hypercapnic respiratory failure, PNA, UTI    Reccs:  - Acute hypoxemic/ hypercapnic respiratory failure likely in setting of PNA  -satting 92% on 6LNC. wean as able for goal >92%. Patient on RA today with spo2 in the 70s but asymptomatic placed patient back on NC.   - likely  OHS/JOSE R. Would continue with Nocturnal NIV and prn  - C/W CAP coverage for now  - Legionella negative, Flu/ Covid/ RVP negative  - F/U Mycoplasma IgM, Full RVP, Sputum culture  - wheezing improved. will make duonebs prn  - TTE with nl LV function  - DNR/DNI: trial of NIV  - Rest of care as per primary team    d/w dr jones

## 2025-01-07 NOTE — PROGRESS NOTE ADULT - PROBLEM SELECTOR PLAN 5
- AAOx1 at baseline

## 2025-01-08 ENCOUNTER — TRANSCRIPTION ENCOUNTER (OUTPATIENT)
Age: 85
End: 2025-01-08

## 2025-01-08 VITALS
DIASTOLIC BLOOD PRESSURE: 75 MMHG | TEMPERATURE: 98 F | RESPIRATION RATE: 19 BRPM | HEART RATE: 87 BPM | SYSTOLIC BLOOD PRESSURE: 142 MMHG | OXYGEN SATURATION: 93 %

## 2025-01-08 LAB
ANION GAP SERPL CALC-SCNC: 4 MMOL/L — LOW (ref 5–17)
BUN SERPL-MCNC: 13 MG/DL — SIGNIFICANT CHANGE UP (ref 7–23)
CALCIUM SERPL-MCNC: 8.8 MG/DL — SIGNIFICANT CHANGE UP (ref 8.5–10.1)
CHLORIDE SERPL-SCNC: 99 MMOL/L — SIGNIFICANT CHANGE UP (ref 96–108)
CO2 SERPL-SCNC: 38 MMOL/L — HIGH (ref 22–31)
CREAT SERPL-MCNC: 0.28 MG/DL — LOW (ref 0.5–1.3)
EGFR: 105 ML/MIN/1.73M2 — SIGNIFICANT CHANGE UP
GLUCOSE SERPL-MCNC: 93 MG/DL — SIGNIFICANT CHANGE UP (ref 70–99)
HCT VFR BLD CALC: 47.4 % — HIGH (ref 34.5–45)
HGB BLD-MCNC: 14.8 G/DL — SIGNIFICANT CHANGE UP (ref 11.5–15.5)
MAGNESIUM SERPL-MCNC: 2.1 MG/DL — SIGNIFICANT CHANGE UP (ref 1.6–2.6)
MCHC RBC-ENTMCNC: 28.9 PG — SIGNIFICANT CHANGE UP (ref 27–34)
MCHC RBC-ENTMCNC: 31.2 G/DL — LOW (ref 32–36)
MCV RBC AUTO: 92.6 FL — SIGNIFICANT CHANGE UP (ref 80–100)
NRBC # BLD: 0 /100 WBCS — SIGNIFICANT CHANGE UP (ref 0–0)
PHOSPHATE SERPL-MCNC: 2.6 MG/DL — SIGNIFICANT CHANGE UP (ref 2.5–4.5)
PLATELET # BLD AUTO: 225 K/UL — SIGNIFICANT CHANGE UP (ref 150–400)
POTASSIUM SERPL-MCNC: 3.6 MMOL/L — SIGNIFICANT CHANGE UP (ref 3.5–5.3)
POTASSIUM SERPL-SCNC: 3.6 MMOL/L — SIGNIFICANT CHANGE UP (ref 3.5–5.3)
RBC # BLD: 5.12 M/UL — SIGNIFICANT CHANGE UP (ref 3.8–5.2)
RBC # FLD: 13.7 % — SIGNIFICANT CHANGE UP (ref 10.3–14.5)
SODIUM SERPL-SCNC: 141 MMOL/L — SIGNIFICANT CHANGE UP (ref 135–145)
WBC # BLD: 9.9 K/UL — SIGNIFICANT CHANGE UP (ref 3.8–10.5)
WBC # FLD AUTO: 9.9 K/UL — SIGNIFICANT CHANGE UP (ref 3.8–10.5)

## 2025-01-08 PROCEDURE — 99239 HOSP IP/OBS DSCHRG MGMT >30: CPT

## 2025-01-08 PROCEDURE — 76604 US EXAM CHEST: CPT | Mod: 26

## 2025-01-08 PROCEDURE — 99233 SBSQ HOSP IP/OBS HIGH 50: CPT | Mod: 25

## 2025-01-08 PROCEDURE — 71045 X-RAY EXAM CHEST 1 VIEW: CPT | Mod: 26

## 2025-01-08 RX ORDER — IPRATROPIUM BROMIDE AND ALBUTEROL SULFATE .5; 2.5 MG/3ML; MG/3ML
3 SOLUTION RESPIRATORY (INHALATION)
Qty: 0 | Refills: 0 | DISCHARGE
Start: 2025-01-08

## 2025-01-08 RX ADMIN — Medication 25 MILLIGRAM(S): at 17:20

## 2025-01-08 RX ADMIN — ENOXAPARIN SODIUM 40 MILLIGRAM(S): 60 INJECTION INTRAVENOUS; SUBCUTANEOUS at 06:05

## 2025-01-08 RX ADMIN — Medication 25 MILLIGRAM(S): at 06:05

## 2025-01-08 NOTE — PROGRESS NOTE ADULT - PROVIDER SPECIALTY LIST ADULT
Infectious Disease
Pulmonology
Infectious Disease
Pulmonology
Pulmonology
Hospitalist

## 2025-01-08 NOTE — PROGRESS NOTE ADULT - NS ATTEND AMEND GEN_ALL_CORE FT
All labs and cultures and imaging and pertinent chart notes reviewed by me.    case d/w Np Alexandrea at length and agree with her assessment and plan.    1/3:   afebrile is on NC,   leukocytosis better-14.95  BCs NGTD, x 2   covid/influenza/RSV negative  , Mycoplasma and Legionella urine ag are pending,     Impression-  respiratory distress possibly copd exacerbation vs pneumonia  + UA    Plan-  check sputum cx   await legionella and mycoplasma - pending   continue zithromax and ceftriaxone ( day #2)  pulmonology consult     Nidhi Araujo MD  Infectious Disease Attending    for any questions please do not hesitate to contact me either via teams or by calling [phone]
All labs and cultures and imaging and pertinent chart notes reviewed by me.    case d/w Np Alexandrea at length and agree with her assessment and plan.    afebrile  clinically has improved  mycoplasma and legionella negative  sputum cx -pending    blood cx- NGTD    advise to continue with ceftriaxone  for 1-2 more days..  d/c zithromax.    Nidhi Araujo MD  Infectious Disease Attending    for any questions please do not hesitate to contact me either via teams or by calling [phone]
pt seen and examined at bedside with PA    on visit pt noted to be on room air  self removed O2  found oxygen on top of pt's head  O2 sat on RA at rest 75%  O2 3L NC with O2 sat 88%  oxygen titrated to 6L at bedside with O2 sat 90%  pt remains confused   no wheeze on exam today  no lower extremity edema  s/p lasix 20mg IVP yesterday with 1.1L urine output in last 24 hours  pt denies SOB  occasional cough during exam but not expectorating      84F PMH HTN, dementia (AAOx1 baseline), hx of breast CA, recurrent UTIs, mood disorder, ?former smoker presents from nursing facility for respiratory distress, SOB, unresponsiveness/lethargy, wheeze. Noted to be febrile 101.7. Admitted to medical service for sepsis due to UTI/PNA with hypoxic and hypercarbic respiratory failure. Placed on AVAPS with blood gas 7.26/83/106/37/98%. Pulmonary consulted for ?COPD exacerbation.    Dx: acute hypoxic respiratory failure, acute on chronic hypercarbic respiratory failure, pulmonary congestion, PNA, UTI    - no prior hx of COPD or asthma  - ? former smoker  - no wheeze on exam today  - has chronic stasis skin changes to bilateral LE however without edema  - initial BNP elevated 1379,  repeat proBNP 3385  - wheeze: pulmonary vs cardiac  - POCUS lung 1/3/25 with moderate bilateral B lines no effusions consistent with pulmonary congestion  - RVP negative  - mycoplasma and legionella negative  - sputum cx with normal respiratory marc  - s/p gentle diuresis for pulmonary congestion with lasix 20mg IVP x1 yesterday  - 2D echo with normal LV function and indeterminate diastolic function  - she has baseline elevated bicarb level and is here with acute on chronic hypercarbic respiratory failure  - due to body habitus she may have underlying JOSE R and chronic hypercarbia  - would need outpatient PFTs to evaluate for underlying COPD if she was a smoker in the past  - may benefit from sleep study as well  - hypoxic today off O2 to the 70s on RA. O2 uptitrated to 6L NC to maintain O2 sat >90%  - check repeat CXR  - completed a course of ceftriaxone/azithromycin for UTI/PNA  - duonebs prn  - cont bipap at night and PRN during the day for work of breathing  - would have pt do incentive spirometer however with dementia pt unable to coordinate breathing with device  - refusing manual bedside chest PT "don't touch me. I don't want to situp"  - DNR/DNI, trial of NIV  - remainder of care per primary team
pt seen and examined with PA    no wheeze on exam today  reports SOB improving  afebrile  nurse reports SVT overnight with desaturation placed on 4L NC      84F PMH HTN, dementia (AAOx1 baseline), hx of breast CA, recurrent UTIs, mood disorder, ?former smoker presents from nursing facility for respiratory distress, SOB, unresponsiveness/lethargy, wheeze. Noted to be febrile 101.7. Admitted to medical service for sepsis due to UTI/PNA with hypoxic and hypercarbic respiratory failure. Placed on AVAPS with blood gas 7.26/83/106/37/98%. Pulmonary consulted for ?COPD exacerbation.    Dx: acute hypoxic respiratory failure, acute on chronic hypercarbic respiratory failure, pulmonary congestion, PNA, UTI    - no prior hx of COPD or asthma  - ? former smoker  - bilateral wheeze noted on exam other day, wheeze resolved today  - has chronic stasis skin changes to bilateral LE however without edema  - initial BNP elevated 1379  - wheeze: pulmonary vs cardiac  - POCUS lung the other day with moderate bilateral B lines no effusions consistent with pulmonary congestion  - flu/covid/RSV negative  - RVP negative  - mycoplasma and legionella negative  - sputum cx with normal respiratory marc  - repeat proBNP 3385 up from admission 1379  - will give gentle diuresis for pulmonary congestion with lasix 20mg IVP x1 today  - 2D echo with normal LV function and indeterminate diastolic function  - blood gas improved with most recent gas 7.42/59/78/38/96%  - she has baseline elevated bicarb level and is here with acute on chronic hypercarbic respiratory failure  - due to body habitus she may have underlying JOSE R and chronic hypercarbia  - would need outpatient PFTs to evaluate for underlying COPD if she was a smoker in the past  - may benefit from sleep study as well  - completed a course of ceftriaxone/azithromycin for UTI/PNA  - can change duonebs to prn q6 hours as wheeze improved  - cont bipap at night and PRN during the day for work of breathing  - DNR/DNI, trial of NIV  - remainder of care per primary team
pt seen and examined at bedside with PA    pt on BiPAP at time of exam  in no respiratory distress  placed on NC and noted NC tubing to have been severed  new NC placed on patient  on 4L NC  pt remains confused with underlying dementia  no wheeze   no lower extremity edema  2.3L UO over last 24 hours  pt denies SOB  CXR today (my read) bilateral hilar opacities and fullness (increased from prior film). pending official read  afebrile  leukocytosis resolved and normalized      POCUS lung  [x] indication  - persistent hypoxia    [x] findings  - some scattered focal B lines bilaterally  - no effusions      84F PMH HTN, dementia (AAOx1 baseline), hx of breast CA, recurrent UTIs, mood disorder, ?former smoker presents from nursing facility for respiratory distress, SOB, unresponsiveness/lethargy, wheeze. Noted to be febrile 101.7. Admitted to medical service for sepsis due to UTI/PNA with hypoxic and hypercarbic respiratory failure. Placed on AVAPS with blood gas 7.26/83/106/37/98%. Pulmonary consulted for ?COPD exacerbation.    Dx: acute hypoxic respiratory failure, acute on chronic hypercarbic respiratory failure, pulmonary congestion, PNA, UTI    - no known prior hx of COPD or asthma  - ? former smoker  - no wheeze on exam today  - has chronic stasis skin changes to bilateral LE however without edema  - initial BNP elevated 1379,  repeat proBNP 3385  - wheeze: pulmonary vs cardiac  - POCUS lung 1/3/25 with moderate bilateral B lines no effusions consistent with pulmonary congestion  - repeat lung US today with scattered focal B lines and no pleural effusions  - RVP, mycoplasma and legionella negative  - sputum cx with normal respiratory marc  - would check CT chest for evaluation of lung parenchyma to assess for hypoxia   - s/p gentle diuresis for pulmonary congestion with lasix 20mg IVP x1 1/6  - 2D echo with normal LV function and indeterminate diastolic function  - she has baseline elevated bicarb level and is here with acute on chronic hypercarbic respiratory failure  - due to body habitus she may have underlying undiagnosed JOSE R and chronic hypercarbia  - would need outpatient PFTs to evaluate for underlying COPD if she was a smoker in the past  - may benefit from sleep study as well  - completed a course of ceftriaxone/azithromycin for UTI/PNA  - duonebs prn  - cont bipap at night and PRN during the day for work of breathing  - would have pt do incentive spirometer however with dementia pt unable to coordinate breathing with device  - refusing manual bedside chest PT   - DNR/DNI, trial of NIV  - remainder of care per primary team

## 2025-01-08 NOTE — DISCHARGE NOTE NURSING/CASE MANAGEMENT/SOCIAL WORK - NSDCFUADDAPPT_GEN_ALL_CORE_FT
It is important to see your primary physician as well as the physicians noted below within the next week to perform a comprehensive medical review.  Call their offices for an appointment as soon as you leave the hospital.  You will also need to see them for renewal of your medications.  If you do not have a primary physician, contact the [facility] Physician Referral Service at [phone].  To obtain your results, you can access the [facility] Patient Portal at [email or web address].  Your medical issues appear to be stable at this time, but if your symptoms recur or worsen, contact your physicians and/or return to the hospital if necessary.  If you encounter any issues or questions with your medication, call your physicians before stopping the medication.

## 2025-01-08 NOTE — DISCHARGE NOTE PROVIDER - NSDCCPCAREPLAN_GEN_ALL_CORE_FT
PRINCIPAL DISCHARGE DIAGNOSIS  Diagnosis: Acute respiratory failure with hypoxia and hypercapnia  Assessment and Plan of Treatment: Please continue to titrate Oxygen and continue Bipap at night.

## 2025-01-08 NOTE — DISCHARGE NOTE PROVIDER - NSDCCPGOAL_GEN_ALL_CORE_FT
To get better and follow your care plan as instructed. Klisyri Counseling:  I discussed with the patient the risks of Klisyri including but not limited to erythema, scaling, itching, weeping, crusting, and pain.

## 2025-01-08 NOTE — DISCHARGE NOTE PROVIDER - NSDCFUADDAPPT_GEN_ALL_CORE_FT
It is important to see your primary physician as well as the physicians noted below within the next week to perform a comprehensive medical review.  Call their offices for an appointment as soon as you leave the hospital.  You will also need to see them for renewal of your medications.  If you do not have a primary physician, contact the [facility] Physician Referral Service at [phone].  To obtain your results, you can access the [facility] Patient Portal at [email or web address].  Your medical issues appear to be stable at this time, but if your symptoms recur or worsen, contact your physicians and/or return to the hospital if necessary.  If you encounter any issues or questions with your medication, call your physicians before stopping the medication.

## 2025-01-08 NOTE — DISCHARGE NOTE PROVIDER - ATTENDING DISCHARGE PHYSICAL EXAMINATION:
General: Awake, alert NAD  HEENT: NC/AT, PERRL, MMM  Neck: supple, no JVD  Respiratory: ctabl  Cardiovascular: Regular rhythm, tachycardic  Abdomen: soft, NTTP, ND, +BS  Extremities:  no c/c/e. B/L LE discoloration chronic stasis appearing  Skin: no rashes  Neurological: A&Ox1, confused. non focal

## 2025-01-08 NOTE — DISCHARGE NOTE PROVIDER - HOSPITAL COURSE
Patient is a 84F, with PMHx of Dementia, mood disorder, former smoker, recurrent UTIs, breast cancer, who was sent from NH for respiratory distress. She was given solumedrol 40, duoneb in the field. She was wheezing. Patient was saturating 93% on NRB mask. She was put on AVAPS. Patient is unable to be aroused. Only minimal reaction to sternal rub    ##Acute sepsis.   ##Gram negative bacterial PNA  ##Acute respiratory failure with hypoxia  ##Acute hypercapnic respiratory failure   ##COPD, questionable   presents from nursing facility for respiratory distress, SOB, unresponsiveness/lethargy, wheeze. Noted to be febrile 101.7. Admitted to medical service for sepsis due to UTI/PNA with hypoxic and hypercarbic respiratory failure. flu/covid/RSV negative. RVP negative. mycoplasma and legionella negative  sputum cx with normal respiratory amrc. blood cultures- NGTD. Likely has undiagnosed COPD given smoking history and likely component of JOSE R. Continue to titrate O2 and continue NIV at night. Stable for discharge back to nursing home with PCP and pulm followup. Needs PSG.     #NSVT  One episode of NSVT in tele while she was hypoxic off NC. EF preserved, C/w low dose BB.    # Acute UTI.   ·  UA with 7 WBC. Has hx of recurrent UTIs. No urine clx was sent. Treated with Abx    # Dementia.   - AAOx1 at baseline.    Stable for d/c back to nursing home today

## 2025-01-08 NOTE — DISCHARGE NOTE NURSING/CASE MANAGEMENT/SOCIAL WORK - NSDCVIVACCINE_GEN_ALL_CORE_FT
COVID-19, mRNA, LNP-S, PF, 30 mcg/0.3 mL dose (Pfizer); 08-Oct-2021 16:24; Crystal Samuel (RN); [employer]; [id number] (Exp. Date: 11-Jan-2021); IntraMuscular; Deltoid Left.; 0.3 milliLiter(s);   Tdap; 03-May-2019 11:04; Kathie Christianson (WOODY); [employer]; [id number] (Exp. Date: 12-Mar-2021); IntraMuscular; Deltoid Right.; 0.5 milliLiter(s); VIS (VIS Published: 09-May-2013, VIS Presented: 03-May-2019);

## 2025-01-08 NOTE — DISCHARGE NOTE NURSING/CASE MANAGEMENT/SOCIAL WORK - PATIENT PORTAL LINK FT
You can access the FollowMyHealth Patient Portal offered by [facility] by registering at the following website: [email or web address]. By joining [email or web address] FollowMyHealth portal, you will also be able to view your health information using other applications (apps) compatible with our system.

## 2025-01-08 NOTE — PROGRESS NOTE ADULT - TIME BILLING
review of chart, records, blood work, vitals, medications, imaging, direct patient care.
review of chart, blood work, vitals, medications, imaging, direct patient care. Time not inclusive of procedures performed on same day.
review of chart, blood work, vitals, medications, imaging, direct patient care. Time not inclusive of procedures performed on same day.

## 2025-01-08 NOTE — PROVIDER CONTACT NOTE (OTHER) - SITUATION
discussed with TEDDY OWENS. pt had 6 beats fo vtach pt awake shows no signs of distress or discomfort pt bipap in place vital documented

## 2025-01-08 NOTE — PROGRESS NOTE ADULT - REASON FOR ADMISSION
Sepsis, acute respiratory failure with hypoxia and hypercapnia

## 2025-01-08 NOTE — PROGRESS NOTE ADULT - SUBJECTIVE AND OBJECTIVE BOX
Interval Events: NAEO. bipap at night, NC during day.     REVIEW OF SYSTEMS: all negative unless hpi     OBJECTIVE:  ICU Vital Signs Last 24 Hrs  T(C): 36.8 (08 Jan 2025 05:10), Max: 37.1 (07 Jan 2025 16:13)  T(F): 98.3 (08 Jan 2025 05:10), Max: 98.8 (07 Jan 2025 16:13)  HR: 80 (08 Jan 2025 05:43) (62 - 89)  BP: 152/87 (08 Jan 2025 05:10) (128/76 - 168/74)  BP(mean): --  ABP: --  ABP(mean): --  RR: 18 (08 Jan 2025 05:10) (18 - 19)  SpO2: 95% (08 Jan 2025 05:43) (85% - 100%)    O2 Parameters below as of 08 Jan 2025 05:10  Patient On (Oxygen Delivery Method): BiPAP/CPAP              01-07 @ 07:01  -  01-08 @ 07:00  --------------------------------------------------------  IN: 300 mL / OUT: 2300 mL / NET: -2000 mL      CAPILLARY BLOOD GLUCOSE          PHYSICAL EXAM:  General: Awake, alert NAD  HEENT: NC/AT, PERRL, MMM  Neck: supple, no JVD  Respiratory: ctabl  Cardiovascular: Regular rhythm, tachycardic  Abdomen: soft, NTTP, ND, +BS  Extremities:  no c/c/e. B/L LE discoloration chronic stasis appearing  Skin: no rashes  Neurological: A&Ox1, confused. non focal    HOSPITAL MEDICATIONS:  enoxaparin Injectable 40 milliGRAM(s) SubCutaneous every 24 hours      metoprolol tartrate 25 milliGRAM(s) Oral two times a day      albuterol/ipratropium for Nebulization 3 milliLiter(s) Nebulizer every 6 hours PRN              influenza  Vaccine (HIGH DOSE) 0.5 milliLiter(s) IntraMuscular once          LABS:                        14.1   10.68 )-----------( 208      ( 07 Jan 2025 06:45 )             46.6     Hgb Trend: 14.1<--, 14.0<--, 13.7<--, 13.4<--, 14.0<--  01-07    140  |  98  |  13  ----------------------------<  109[H]  3.5   |  39[H]  |  0.33[L]    Ca    8.8      07 Jan 2025 06:45      Creatinine Trend: 0.33<--, 0.36<--, 0.39<--, 0.44<--, 0.52<--, 0.48<--    Urinalysis Basic - ( 07 Jan 2025 06:45 )    Color: x / Appearance: x / SG: x / pH: x  Gluc: 109 mg/dL / Ketone: x  / Bili: x / Urobili: x   Blood: x / Protein: x / Nitrite: x   Leuk Esterase: x / RBC: x / WBC x   Sq Epi: x / Non Sq Epi: x / Bacteria: x                 Interval Events: NAEO. bipap at night, NC during day. 4L with spo2 in low 90s. RA Spo2 80s.    REVIEW OF SYSTEMS: all negative unless hpi     OBJECTIVE:  ICU Vital Signs Last 24 Hrs  T(C): 36.8 (08 Jan 2025 05:10), Max: 37.1 (07 Jan 2025 16:13)  T(F): 98.3 (08 Jan 2025 05:10), Max: 98.8 (07 Jan 2025 16:13)  HR: 80 (08 Jan 2025 05:43) (62 - 89)  BP: 152/87 (08 Jan 2025 05:10) (128/76 - 168/74)  BP(mean): --  ABP: --  ABP(mean): --  RR: 18 (08 Jan 2025 05:10) (18 - 19)  SpO2: 95% (08 Jan 2025 05:43) (85% - 100%)    O2 Parameters below as of 08 Jan 2025 05:10  Patient On (Oxygen Delivery Method): BiPAP/CPAP              01-07 @ 07:01  -  01-08 @ 07:00  --------------------------------------------------------  IN: 300 mL / OUT: 2300 mL / NET: -2000 mL      CAPILLARY BLOOD GLUCOSE          PHYSICAL EXAM:  General: Awake, alert NAD  HEENT: NC/AT, PERRL, MMM  Neck: supple, no JVD  Respiratory: ctabl  Cardiovascular: Regular rhythm, tachycardic  Abdomen: soft, NTTP, ND, +BS  Extremities:  no c/c/e. B/L LE discoloration chronic stasis appearing  Skin: no rashes  Neurological: A&Ox1, confused. non focal    HOSPITAL MEDICATIONS:  enoxaparin Injectable 40 milliGRAM(s) SubCutaneous every 24 hours      metoprolol tartrate 25 milliGRAM(s) Oral two times a day      albuterol/ipratropium for Nebulization 3 milliLiter(s) Nebulizer every 6 hours PRN              influenza  Vaccine (HIGH DOSE) 0.5 milliLiter(s) IntraMuscular once          LABS:                        14.1   10.68 )-----------( 208      ( 07 Jan 2025 06:45 )             46.6     Hgb Trend: 14.1<--, 14.0<--, 13.7<--, 13.4<--, 14.0<--  01-07    140  |  98  |  13  ----------------------------<  109[H]  3.5   |  39[H]  |  0.33[L]    Ca    8.8      07 Jan 2025 06:45      Creatinine Trend: 0.33<--, 0.36<--, 0.39<--, 0.44<--, 0.52<--, 0.48<--    Urinalysis Basic - ( 07 Jan 2025 06:45 )    Color: x / Appearance: x / SG: x / pH: x  Gluc: 109 mg/dL / Ketone: x  / Bili: x / Urobili: x   Blood: x / Protein: x / Nitrite: x   Leuk Esterase: x / RBC: x / WBC x   Sq Epi: x / Non Sq Epi: x / Bacteria: x                 Interval Events: NAEO. bipap at night, NC during day. 4L with spo2 in low 90s. RA Spo2 80s.    REVIEW OF SYSTEMS: all negative unless hpi     OBJECTIVE:  Vital Signs Last 24 Hrs  T(C): 36.8 (08 Jan 2025 05:10), Max: 37.1 (07 Jan 2025 16:13)  T(F): 98.3 (08 Jan 2025 05:10), Max: 98.8 (07 Jan 2025 16:13)  HR: 80 (08 Jan 2025 05:43) (62 - 89)  BP: 152/87 (08 Jan 2025 05:10) (128/76 - 168/74)  RR: 18 (08 Jan 2025 05:10) (18 - 19)  SpO2: 95% (08 Jan 2025 05:43) (85% - 100%)    O2 Parameters below as of 08 Jan 2025 05:10  Patient On (Oxygen Delivery Method): BiPAP/CPAP              01-07 @ 07:01  -  01-08 @ 07:00  --------------------------------------------------------  IN: 300 mL / OUT: 2300 mL / NET: -2000 mL      CAPILLARY BLOOD GLUCOSE          PHYSICAL EXAM:  General: Awake, alert NAD  HEENT: NC/AT, PERRL, MMM  Neck: supple, no JVD  Respiratory: ctabl  Cardiovascular: Regular rhythm, tachycardic  Abdomen: soft, NTTP, ND, +BS  Extremities:  no c/c/e. B/L LE discoloration chronic stasis appearing  Skin: no rashes  Neurological: A&Ox1, confused. non focal    HOSPITAL MEDICATIONS:  enoxaparin Injectable 40 milliGRAM(s) SubCutaneous every 24 hours      metoprolol tartrate 25 milliGRAM(s) Oral two times a day      albuterol/ipratropium for Nebulization 3 milliLiter(s) Nebulizer every 6 hours PRN              influenza  Vaccine (HIGH DOSE) 0.5 milliLiter(s) IntraMuscular once          LABS:                        14.1   10.68 )-----------( 208      ( 07 Jan 2025 06:45 )             46.6     Hgb Trend: 14.1<--, 14.0<--, 13.7<--, 13.4<--, 14.0<--  01-07    140  |  98  |  13  ----------------------------<  109[H]  3.5   |  39[H]  |  0.33[L]    Ca    8.8      07 Jan 2025 06:45      Creatinine Trend: 0.33<--, 0.36<--, 0.39<--, 0.44<--, 0.52<--, 0.48<--      RADIOLOGY  < from: Xray Chest 1 View- PORTABLE-Urgent (01.01.25 @ 15:58) >  Mild CHF. Cardiomegaly. Can't exclude underlying pneumonia.

## 2025-01-08 NOTE — DISCHARGE NOTE NURSING/CASE MANAGEMENT/SOCIAL WORK - NSDCPEFALRISK_GEN_ALL_CORE
For information on Fall & Injury Prevention, visit: [email or web address] OR  [email or web address] OR  https://www.cdc.gov/steadi/patient.html

## 2025-01-08 NOTE — DISCHARGE NOTE NURSING/CASE MANAGEMENT/SOCIAL WORK - FINANCIAL ASSISTANCE
[facility] provides services at a reduced cost to those who are determined to be eligible through [facility]’s financial assistance program. Information regarding [facility]’s financial assistance program can be found by going to [email or web address] or by calling [phone].

## 2025-01-08 NOTE — DISCHARGE NOTE PROVIDER - NSDCMRMEDTOKEN_GEN_ALL_CORE_FT
Bisac-Evac 10 mg rectal suppository: 1 suppository(ies) rectally once a day as needed for  constipation  ipratropium-albuterol 0.5 mg-2.5 mg/3 mL inhalation solution: 3 milliliter(s) inhaled every 6 hours As needed Shortness of Breath and/or Wheezing  metoprolol tartrate 25 mg oral tablet: 1 tab(s) orally 2 times a day  traZODone 50 mg oral tablet: 0.5 tab(s) orally 2 times a day

## 2025-01-08 NOTE — DISCHARGE NOTE PROVIDER - NSDCCAREPROVSEEN_GEN_ALL_CORE_FT
Nick, Matteo Brown, Ericka Lucas, Dori Garcia, Landy Araujo, Nidhi Lechuga, Nohemy Shah, Lj Moreno, Mor Lauren, Myke Arita, Suma Arceo, Kevin Baker, Joann Joiner, Vladimir RAMOS

## 2025-01-08 NOTE — PROGRESS NOTE ADULT - ASSESSMENT
Patient is a 84F, with PMHx of Dementia, mood disorder, former smoker, recurrent UTIs, breast cancer, who was sent from NH for respiratory distress. Wheezing on arrival, SpO2 was 93% on NRB mask. Hypercapnic initially requiring AVAPS. Admitted to medicine with PNA, UTI. Pulmonary consulted    DX: Acute hypoxemic/ hypercapnic respiratory failure, PNA, UTI    Reccs:  - Acute hypoxemic/ hypercapnic respiratory failure likely in setting of PNA  -satting 92% on 6LNC. wean as able for goal >92%. Patient on RA today with spo2 in the 70s but asymptomatic placed patient back on NC.   - likely  OHS/JOSE R. Would continue with Nocturnal NIV and prn  - C/W CAP coverage for now  - Legionella negative, Flu/ Covid/ RVP negative  - F/U Mycoplasma IgM, Full RVP, Sputum culture  - wheezing improved. will make duonebs prn  - TTE with nl LV function  - DNR/DNI: trial of NIV  - Rest of care as per primary team    d/w dr jones Patient is a 84F, with PMHx of Dementia, mood disorder, former smoker, recurrent UTIs, breast cancer, who was sent from NH for respiratory distress. Wheezing on arrival, SpO2 was 93% on NRB mask. Hypercapnic initially requiring AVAPS. Admitted to medicine with PNA, UTI. Pulmonary consulted    DX: Acute hypoxemic/ hypercapnic respiratory failure, PNA, UTI    Reccs:  - POCUS: scattered blines no pleural effusions   - will send for CT chest to better evaluate for persistent hypoxemia.   -satting 92% on 4LNC. wean as able for goal >92%.  - likely  OHS/JOSE R. Would continue with Nocturnal NIV and prn  - C/W CAP coverage for now  - Legionella negative, Flu/ Covid/ RVP negative  - F/U Mycoplasma IgM, Full RVP, Sputum culture  - wheezing improved. will make duonebs prn  - TTE with nl LV function  - DNR/DNI: trial of NIV  - Rest of care as per primary team    d/w dr jones

## 2025-01-13 DIAGNOSIS — J15.69 PNEUMONIA DUE TO OTHER GRAM-NEGATIVE BACTERIA: ICD-10-CM

## 2025-01-13 DIAGNOSIS — J96.01 ACUTE RESPIRATORY FAILURE WITH HYPOXIA: ICD-10-CM

## 2025-01-13 DIAGNOSIS — Z87.891 PERSONAL HISTORY OF NICOTINE DEPENDENCE: ICD-10-CM

## 2025-01-13 DIAGNOSIS — Z66 DO NOT RESUSCITATE: ICD-10-CM

## 2025-01-13 DIAGNOSIS — Z85.3 PERSONAL HISTORY OF MALIGNANT NEOPLASM OF BREAST: ICD-10-CM

## 2025-01-13 DIAGNOSIS — I47.20 VENTRICULAR TACHYCARDIA, UNSPECIFIED: ICD-10-CM

## 2025-01-13 DIAGNOSIS — F39 UNSPECIFIED MOOD [AFFECTIVE] DISORDER: ICD-10-CM

## 2025-01-13 DIAGNOSIS — G47.33 OBSTRUCTIVE SLEEP APNEA (ADULT) (PEDIATRIC): ICD-10-CM

## 2025-01-13 DIAGNOSIS — N39.0 URINARY TRACT INFECTION, SITE NOT SPECIFIED: ICD-10-CM

## 2025-01-13 DIAGNOSIS — I10 ESSENTIAL (PRIMARY) HYPERTENSION: ICD-10-CM

## 2025-01-13 DIAGNOSIS — J44.0 CHRONIC OBSTRUCTIVE PULMONARY DISEASE WITH (ACUTE) LOWER RESPIRATORY INFECTION: ICD-10-CM

## 2025-01-13 DIAGNOSIS — A41.9 SEPSIS, UNSPECIFIED ORGANISM: ICD-10-CM

## 2025-01-13 DIAGNOSIS — J96.22 ACUTE AND CHRONIC RESPIRATORY FAILURE WITH HYPERCAPNIA: ICD-10-CM

## 2025-01-21 ENCOUNTER — TRANSCRIPTION ENCOUNTER (OUTPATIENT)
Age: 85
End: 2025-01-21

## 2025-01-23 NOTE — SWALLOW BEDSIDE ASSESSMENT ADULT - ASR SWALLOW DENTITION
oriented    Neck - supple, no significant adenopathy  Chest - clear to auscultation, no wheezes, rales or rhonchi, symmetric air entry  Heart - normal rate, regular rhythm, normal S1, S2, no murmurs, rubs, clicks or gallops  Abdomen - soft, nontender, nondistended, no masses or organomegaly  no abdominal bruits.  Non-obese Protuberant: No  Neurological - alert, oriented, normal speech, no focal findings or movement disorder noted, motor and sensory grossly normal bilaterally  Musculoskeletal - no joint tenderness, deformity or swelling  But left leg weakness.  Extremities - peripheral pulses normal, no pedal edema, no clubbing or cyanosis, Petra's sign negative bilaterally  Prosthesis: No  Skin - normal coloration and turgor, no rashes, no suspicious skin lesions noted      I have reviewed recent diagnostic testing including labs,     Diagnosis Orders   1. Primary hypertension  CBC with Auto Differential      2. 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 Mixed hyperlipidemia  Lipid Panel    ALT    AST      4. S/P CABG (coronary artery bypass graft)  Lipid Panel    ALT    AST    CBC with Auto Differential      5. Foot pain, bilateral  XR FOOT LEFT (MIN 3 VIEWS)    XR FOOT RIGHT (MIN 3 VIEWS)      6. Bilateral foot pain             Diagnosis Orders   1. Primary hypertension  CBC with Auto Differential      2. 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 Mixed hyperlipidemia  Lipid Panel    ALT    AST      4. S/P CABG (coronary artery bypass graft)  Lipid Panel    ALT    AST    CBC with Auto Differential      5. Foot pain, bilateral  XR FOOT LEFT (MIN 3 VIEWS)    XR FOOT RIGHT (MIN 3 VIEWS)      6. Bilateral foot pain                  Orders Placed This Encounter   Procedures    XR FOOT LEFT (MIN 3 VIEWS)     Standing Status:   Future     Standing Expiration Date:   1/23/2026     Order Specific Question:   Reason for exam:     Answer:   foot pain    XR FOOT RIGHT  present and adequate

## 2025-01-29 ENCOUNTER — TRANSCRIPTION ENCOUNTER (OUTPATIENT)
Age: 85
End: 2025-01-29

## 2025-02-04 NOTE — CONSULT NOTE ADULT - NS ATTEND OPT1 GEN_ALL_CORE
Controlled with current regime
I attest my time as attending is greater than 50% of the total combined time spent on qualifying patient care activities by the PA/NP and attending.

## 2025-02-05 ENCOUNTER — TRANSCRIPTION ENCOUNTER (OUTPATIENT)
Age: 85
End: 2025-02-05

## 2025-03-20 NOTE — DISCHARGE NOTE PROVIDER - ATTENDING DISCHARGE PHYSICAL EXAM:
PAST SURGICAL HISTORY:  H/O tooth extraction     History of tonsillectomy     Status post endovenous radiofrequency ablation of saphenous vein     
Attending Discharge Physical Examination:

## 2025-06-07 NOTE — PROGRESS NOTE ADULT - SUBJECTIVE AND OBJECTIVE BOX
The patient's goals for the shift include      The clinical goals for the shift include keep pt safe and free from falls this shift      Problem: Pain - Adult  Goal: Verbalizes/displays adequate comfort level or baseline comfort level  6/7/2025 1331 by Jill Denis RN  Outcome: Progressing  6/7/2025 1129 by Jill Denis RN  Outcome: Progressing     Problem: Safety - Adult  Goal: Free from fall injury  6/7/2025 1331 by Jill Denis RN  Outcome: Progressing  6/7/2025 1129 by Jill Denis RN  Outcome: Progressing     Problem: Discharge Planning  Goal: Discharge to home or other facility with appropriate resources  6/7/2025 1331 by Jill Denis RN  Outcome: Progressing  6/7/2025 1129 by Jill Denis RN  Outcome: Progressing     Problem: Chronic Conditions and Co-morbidities  Goal: Patient's chronic conditions and co-morbidity symptoms are monitored and maintained or improved  6/7/2025 1331 by Jill Denis RN  Outcome: Progressing  6/7/2025 1129 by Jill Denis RN  Outcome: Progressing     Problem: Nutrition  Goal: Nutrient intake appropriate for maintaining nutritional needs  6/7/2025 1331 by Jill Denis RN  Outcome: Progressing  6/7/2025 1129 by Jill Denis RN  Outcome: Progressing     Problem: Skin  Goal: Decreased wound size/increased tissue granulation at next dressing change  6/7/2025 1331 by Jill Denis RN  Outcome: Progressing  6/7/2025 1129 by Jill Denis RN  Outcome: Progressing  Goal: Participates in plan/prevention/treatment measures  6/7/2025 1331 by Jill Denis RN  Outcome: Progressing  6/7/2025 1129 by Jill Denis RN  Outcome: Progressing  Goal: Prevent/manage excess moisture  6/7/2025 1331 by Jill Denis RN  Outcome: Progressing  6/7/2025 1129 by Jill Denis RN  Outcome: Progressing  Goal: Prevent/minimize sheer/friction injuries  6/7/2025 1331 by Jill Denis RN  Outcome:    INTERVAL HPI:  81 year old female HTN, Short term memory loss and Chronic knee pain .  Presented  for weakness and knee pain.  She  reports feeling well and says that she was  brought to the hospital by people living in her house. She is a poor historian   Reported to be  treated  for b/l LE  cellulitis about a month ago, then started taking OTC water pills TID for the last one week, her swelling did improve.   However, for the last two days pt is unable to stand or walk without assistance, at baseline pt is able to ambulate on her own, family felt that her potassium level may be low due to the water pills she was taking.   IN  ED , CT chest showed: Multifocal consolidations in the right and left lower lobes.  Says she is a smoker.    OVERNIGHT EVENTS:  Awake and comfortable.    Vital Signs Last 24 Hrs  T(C): 36.8 (08 Oct 2021 11:18), Max: 37 (07 Oct 2021 23:06)  T(F): 98.2 (08 Oct 2021 11:18), Max: 98.6 (07 Oct 2021 23:06)  HR: 84 (08 Oct 2021 11:18) (74 - 99)  BP: 152/100 (08 Oct 2021 11:18) (124/76 - 152/100)  BP(mean): --  RR: 19 (08 Oct 2021 11:18) (18 - 19)  SpO2: 92% (08 Oct 2021 11:18) (92% - 95%)    PHYSICAL EXAM:  GEN:         Awake, responsive and comfortable.  HEENT:    Normal.    RESP:       no wheezing  CVS:          Regular rate and rhythm.     MEDICATIONS  (STANDING):  cefTRIAXone   IVPB 1000 milliGRAM(s) IV Intermittent every 24 hours  coronavirus Vaccine (PFIZER) 0.3 milliLiter(s) IntraMuscular once  enoxaparin Injectable 40 milliGRAM(s) SubCutaneous daily  lactobacillus acidophilus 1 Tablet(s) Oral two times a day  metoprolol tartrate 25 milliGRAM(s) Oral two times a day  mineral oil for Topical Use 1 Application(s) Topical two times a day    MEDICATIONS  (PRN):  acetaminophen   Tablet .. 650 milliGRAM(s) Oral every 6 hours PRN Temp greater or equal to 38.5C (101.3F), Moderate Pain (4 - 6)    LABS:                        14.6   9.22  )-----------( 265      ( 07 Oct 2021 06:27 )             45.7     10-07    137  |  100  |  14  ----------------------------<  122<H>  3.9   |  34<H>  |  0.38<L>    Ca    9.1      07 Oct 2021 06:27    ASSESSMENT AND PLAN:  ·	Multifocal pneumonia.  ·	Hypokalemia.  ·	HTN.  ·	Obesity.    Pulmonary improved and stable.  Discharge planning.       Progressing  6/7/2025 1129 by Jill Denis RN  Outcome: Progressing  Goal: Promote/optimize nutrition  6/7/2025 1331 by Jill Denis RN  Outcome: Progressing  6/7/2025 1129 by Jill Denis RN  Outcome: Progressing  Goal: Promote skin healing  6/7/2025 1331 by Jill Denis RN  Outcome: Progressing  6/7/2025 1129 by Jill Denis RN  Outcome: Progressing
